# Patient Record
Sex: FEMALE | Race: BLACK OR AFRICAN AMERICAN | NOT HISPANIC OR LATINO | Employment: FULL TIME | RURAL
[De-identification: names, ages, dates, MRNs, and addresses within clinical notes are randomized per-mention and may not be internally consistent; named-entity substitution may affect disease eponyms.]

---

## 2018-02-19 ENCOUNTER — HISTORICAL (OUTPATIENT)
Dept: ADMINISTRATIVE | Facility: HOSPITAL | Age: 48
End: 2018-02-19

## 2018-02-20 LAB
LAB AP CLINICAL INFORMATION: NORMAL
LAB AP DIAGNOSIS - HISTORICAL: NORMAL
LAB AP GROSS PATHOLOGY - HISTORICAL: NORMAL
LAB AP SPECIMEN SUBMITTED - HISTORICAL: NORMAL

## 2018-02-22 ENCOUNTER — HISTORICAL (OUTPATIENT)
Dept: ADMINISTRATIVE | Facility: HOSPITAL | Age: 48
End: 2018-02-22

## 2019-03-06 ENCOUNTER — HISTORICAL (OUTPATIENT)
Dept: ADMINISTRATIVE | Facility: HOSPITAL | Age: 49
End: 2019-03-06

## 2019-03-08 LAB
LAB AP COMMENTS: NORMAL
LAB AP GENERAL CAT - HISTORICAL: NORMAL
LAB AP INTERPRETATION/RESULT - HISTORICAL: NEGATIVE
LAB AP SPECIMEN ADEQUACY - HISTORICAL: NORMAL
LAB AP SPECIMEN SUBMITTED - HISTORICAL: NORMAL

## 2020-07-10 ENCOUNTER — HISTORICAL (OUTPATIENT)
Dept: ADMINISTRATIVE | Facility: HOSPITAL | Age: 50
End: 2020-07-10

## 2020-07-10 LAB
ALBUMIN SERPL BCP-MCNC: 3.9 G/DL (ref 3.5–5)
ALBUMIN/GLOB SERPL: 1.1 {RATIO}
ALP SERPL-CCNC: 89 U/L (ref 39–100)
ALT SERPL W P-5'-P-CCNC: 16 U/L (ref 13–56)
ANION GAP SERPL CALCULATED.3IONS-SCNC: 10 MMOL/L (ref 7–16)
AST SERPL W P-5'-P-CCNC: 12 U/L (ref 15–37)
BACTERIA #/AREA URNS HPF: ABNORMAL /HPF
BASOPHILS # BLD AUTO: 0.03 X10E3/UL (ref 0–0.2)
BASOPHILS NFR BLD AUTO: 0.5 % (ref 0–1)
BILIRUB SERPL-MCNC: 0.2 MG/DL (ref 0–1.2)
BILIRUB UR QL STRIP: NEGATIVE MG/DL
BUN SERPL-MCNC: 16 MG/DL (ref 7–18)
BUN/CREAT SERPL: 17
CALCIUM SERPL-MCNC: 9.1 MG/DL (ref 8.5–10.1)
CHLORIDE SERPL-SCNC: 106 MMOL/L (ref 98–107)
CLARITY UR: CLEAR
CO2 SERPL-SCNC: 30 MMOL/L (ref 21–32)
COLOR UR: YELLOW
CREAT SERPL-MCNC: 0.93 MG/DL (ref 0.5–1.02)
EOSINOPHIL # BLD AUTO: 0.17 X10E3/UL (ref 0–0.5)
EOSINOPHIL NFR BLD AUTO: 2.7 % (ref 1–4)
ERYTHROCYTE [DISTWIDTH] IN BLOOD BY AUTOMATED COUNT: 13.1 % (ref 11.5–14.5)
GLOBULIN SER-MCNC: 3.7 G/DL (ref 2–4)
GLUCOSE SERPL-MCNC: 72 MG/DL (ref 74–106)
GLUCOSE UR STRIP-MCNC: NEGATIVE MG/DL
HCT VFR BLD AUTO: 40.4 % (ref 38–47)
HGB BLD-MCNC: 12.6 G/DL (ref 12–16)
IMM GRANULOCYTES # BLD AUTO: 0.01 X10E3/UL (ref 0–0.04)
IMM GRANULOCYTES NFR BLD: 0.2 % (ref 0–0.4)
KETONES UR STRIP-SCNC: NEGATIVE MG/DL
LEUKOCYTE ESTERASE UR QL STRIP: NEGATIVE LEU/UL
LYMPHOCYTES # BLD AUTO: 2.89 X10E3/UL (ref 1–4.8)
LYMPHOCYTES NFR BLD AUTO: 46.1 % (ref 27–41)
MCH RBC QN AUTO: 30 PG (ref 27–31)
MCHC RBC AUTO-ENTMCNC: 31.2 G/DL (ref 32–36)
MCV RBC AUTO: 96.2 FL (ref 80–96)
MONOCYTES # BLD AUTO: 0.39 X10E3/UL (ref 0–0.8)
MONOCYTES NFR BLD AUTO: 6.2 % (ref 2–6)
MPC BLD CALC-MCNC: 10.1 FL (ref 9.4–12.4)
NEUTROPHILS # BLD AUTO: 2.78 X10E3/UL (ref 1.8–7.7)
NEUTROPHILS NFR BLD AUTO: 44.3 % (ref 53–65)
NITRITE UR QL STRIP: NEGATIVE
NRBC # BLD AUTO: 0 X10E3/UL (ref 0–0)
NRBC, AUTO (.00): 0 /100 (ref 0–0)
PH UR STRIP: 6.5 PH UNITS (ref 5–8)
PLATELET # BLD AUTO: 327 X10E3/UL (ref 150–400)
POTASSIUM SERPL-SCNC: 4.3 MMOL/L (ref 3.5–5.1)
PROT SERPL-MCNC: 7.6 G/DL (ref 6.4–8.2)
PROT UR QL STRIP: NEGATIVE MG/DL
RBC # BLD AUTO: 4.2 X10E6/UL (ref 4.2–5.4)
RBC # UR STRIP: NEGATIVE ERY/UL
RBC #/AREA URNS HPF: ABNORMAL /HPF (ref 0–3)
SODIUM SERPL-SCNC: 142 MMOL/L (ref 136–145)
SP GR UR STRIP: 1.02 (ref 1–1.03)
SQUAMOUS #/AREA URNS LPF: ABNORMAL /LPF
TSH SERPL DL<=0.005 MIU/L-ACNC: 1.62 UIU/ML (ref 0.36–3.74)
UROBILINOGEN UR STRIP-ACNC: 1 EU/DL
WBC # BLD AUTO: 6.27 X10E3/UL (ref 4.5–11)
WBC #/AREA URNS HPF: ABNORMAL /HPF (ref 0–5)

## 2020-07-20 ENCOUNTER — HISTORICAL (OUTPATIENT)
Dept: ADMINISTRATIVE | Facility: HOSPITAL | Age: 50
End: 2020-07-20

## 2020-07-20 LAB — SARS-COV+SARS-COV-2 AG RESP QL IA.RAPID: NEGATIVE

## 2020-11-13 ENCOUNTER — HISTORICAL (OUTPATIENT)
Dept: ADMINISTRATIVE | Facility: HOSPITAL | Age: 50
End: 2020-11-13

## 2021-02-04 ENCOUNTER — HISTORICAL (OUTPATIENT)
Dept: ADMINISTRATIVE | Facility: HOSPITAL | Age: 51
End: 2021-02-04

## 2021-02-04 LAB
ALBUMIN SERPL BCP-MCNC: 4.1 G/DL (ref 3.5–5)
ALBUMIN/GLOB SERPL: 1.1 {RATIO}
ALP SERPL-CCNC: 109 U/L (ref 39–100)
ALT SERPL W P-5'-P-CCNC: 16 U/L (ref 13–56)
ANION GAP SERPL CALCULATED.3IONS-SCNC: 11 MMOL/L (ref 7–16)
AST SERPL W P-5'-P-CCNC: 8 U/L (ref 15–37)
BACTERIA #/AREA URNS HPF: ABNORMAL /HPF
BASOPHILS # BLD AUTO: 0.04 X10E3/UL (ref 0–0.2)
BASOPHILS NFR BLD AUTO: 0.6 % (ref 0–1)
BILIRUB SERPL-MCNC: 0.4 MG/DL (ref 0–1.2)
BILIRUB UR QL STRIP: NEGATIVE MG/DL
BUN SERPL-MCNC: 15 MG/DL (ref 7–18)
BUN/CREAT SERPL: 20
CALCIUM SERPL-MCNC: 9.3 MG/DL (ref 8.5–10.1)
CHLORIDE SERPL-SCNC: 103 MMOL/L (ref 98–107)
CLARITY UR: CLEAR
CO2 SERPL-SCNC: 31 MMOL/L (ref 21–32)
COLOR UR: YELLOW
CREAT SERPL-MCNC: 0.75 MG/DL (ref 0.5–1.02)
EOSINOPHIL # BLD AUTO: 0.26 X10E3/UL (ref 0–0.5)
EOSINOPHIL NFR BLD AUTO: 4 % (ref 1–4)
ERYTHROCYTE [DISTWIDTH] IN BLOOD BY AUTOMATED COUNT: 12.7 % (ref 11.5–14.5)
GLOBULIN SER-MCNC: 3.9 G/DL (ref 2–4)
GLUCOSE SERPL-MCNC: 68 MG/DL (ref 74–106)
GLUCOSE UR STRIP-MCNC: NEGATIVE MG/DL
HCT VFR BLD AUTO: 41.1 % (ref 38–47)
HGB BLD-MCNC: 13.1 G/DL (ref 12–16)
IMM GRANULOCYTES # BLD AUTO: 0.01 X10E3/UL (ref 0–0.04)
IMM GRANULOCYTES NFR BLD: 0.2 % (ref 0–0.4)
KETONES UR STRIP-SCNC: NEGATIVE MG/DL
LEUKOCYTE ESTERASE UR QL STRIP: NEGATIVE LEU/UL
LYMPHOCYTES # BLD AUTO: 2.33 X10E3/UL (ref 1–4.8)
LYMPHOCYTES NFR BLD AUTO: 35.7 % (ref 27–41)
MCH RBC QN AUTO: 30.1 PG (ref 27–31)
MCHC RBC AUTO-ENTMCNC: 31.9 G/DL (ref 32–36)
MCV RBC AUTO: 94.5 FL (ref 80–96)
MONOCYTES # BLD AUTO: 0.48 X10E3/UL (ref 0–0.8)
MONOCYTES NFR BLD AUTO: 7.4 % (ref 2–6)
MPC BLD CALC-MCNC: 10.2 FL (ref 9.4–12.4)
MUCOUS THREADS #/AREA URNS HPF: ABNORMAL /HPF
NEUTROPHILS # BLD AUTO: 3.4 X10E3/UL (ref 1.8–7.7)
NEUTROPHILS NFR BLD AUTO: 52.1 % (ref 53–65)
NITRITE UR QL STRIP: NEGATIVE
NRBC # BLD AUTO: 0 X10E3/UL (ref 0–0)
NRBC, AUTO (.00): 0 /100 (ref 0–0)
PH UR STRIP: 6.5 PH UNITS (ref 5–8)
PLATELET # BLD AUTO: 391 X10E3/UL (ref 150–400)
POTASSIUM SERPL-SCNC: 3.9 MMOL/L (ref 3.5–5.1)
PROT SERPL-MCNC: 8 G/DL (ref 6.4–8.2)
PROT UR QL STRIP: NEGATIVE MG/DL
RBC # BLD AUTO: 4.35 X10E6/UL (ref 4.2–5.4)
RBC # UR STRIP: NEGATIVE ERY/UL
RBC #/AREA URNS HPF: ABNORMAL /HPF (ref 0–3)
SODIUM SERPL-SCNC: 141 MMOL/L (ref 136–145)
SP GR UR STRIP: 1.02 (ref 1–1.03)
SQUAMOUS #/AREA URNS LPF: ABNORMAL /LPF
T4 FREE SERPL-MCNC: 1.13 NG/DL (ref 0.76–1.46)
TSH SERPL DL<=0.005 MIU/L-ACNC: 2.67 UIU/ML (ref 0.36–3.74)
UROBILINOGEN UR STRIP-ACNC: 1 EU/DL
WBC # BLD AUTO: 6.52 X10E3/UL (ref 4.5–11)
WBC #/AREA URNS HPF: ABNORMAL /HPF (ref 0–5)

## 2021-02-08 LAB
REPORT: NORMAL

## 2021-04-02 ENCOUNTER — HOSPITAL ENCOUNTER (OUTPATIENT)
Dept: RADIOLOGY | Facility: HOSPITAL | Age: 51
Discharge: HOME | End: 2021-04-02
Attending: NURSE PRACTITIONER
Payer: COMMERCIAL

## 2021-04-02 DIAGNOSIS — M54.50 LOW BACK PAIN: ICD-10-CM

## 2021-04-02 PROCEDURE — 72040 X-RAY EXAM NECK SPINE 2-3 VW: CPT | Mod: 26,,, | Performed by: RADIOLOGY

## 2021-04-02 PROCEDURE — 72070 XR THORACIC SPINE AP LATERAL: ICD-10-PCS | Mod: 26,,, | Performed by: RADIOLOGY

## 2021-04-02 PROCEDURE — 72070 X-RAY EXAM THORAC SPINE 2VWS: CPT | Mod: 26,,, | Performed by: RADIOLOGY

## 2021-04-02 PROCEDURE — 72070 X-RAY EXAM THORAC SPINE 2VWS: CPT | Mod: TC

## 2021-04-02 PROCEDURE — 72040 X-RAY EXAM NECK SPINE 2-3 VW: CPT | Mod: TC

## 2021-04-02 PROCEDURE — 72040 XR CERVICAL SPINE 2 OR 3 VIEWS: ICD-10-PCS | Mod: 26,,, | Performed by: RADIOLOGY

## 2021-12-08 ENCOUNTER — HOSPITAL ENCOUNTER (OUTPATIENT)
Dept: RADIOLOGY | Facility: HOSPITAL | Age: 51
Discharge: HOME | End: 2021-12-08
Payer: COMMERCIAL

## 2021-12-08 VITALS — WEIGHT: 178 LBS | BODY MASS INDEX: 27.94 KG/M2 | HEIGHT: 67 IN

## 2021-12-08 DIAGNOSIS — Z12.31 VISIT FOR SCREENING MAMMOGRAM: ICD-10-CM

## 2021-12-08 PROCEDURE — 77067 SCR MAMMO BI INCL CAD: CPT | Mod: TC

## 2022-08-15 ENCOUNTER — HOSPITAL ENCOUNTER (EMERGENCY)
Facility: HOSPITAL | Age: 52
Discharge: HOME | End: 2022-08-15
Payer: COMMERCIAL

## 2022-08-15 VITALS
TEMPERATURE: 98 F | HEART RATE: 66 BPM | DIASTOLIC BLOOD PRESSURE: 81 MMHG | HEIGHT: 67 IN | BODY MASS INDEX: 27.47 KG/M2 | RESPIRATION RATE: 14 BRPM | SYSTOLIC BLOOD PRESSURE: 126 MMHG | OXYGEN SATURATION: 97 % | WEIGHT: 175 LBS

## 2022-08-15 DIAGNOSIS — K59.00 CONSTIPATION, UNSPECIFIED CONSTIPATION TYPE: Primary | ICD-10-CM

## 2022-08-15 LAB
ALBUMIN SERPL BCP-MCNC: 3.8 G/DL (ref 3.5–5)
ALBUMIN/GLOB SERPL: 1.2 {RATIO}
ALP SERPL-CCNC: 90 U/L (ref 39–100)
ALT SERPL W P-5'-P-CCNC: 15 U/L (ref 13–56)
ANION GAP SERPL CALCULATED.3IONS-SCNC: 9 MMOL/L (ref 7–16)
AST SERPL W P-5'-P-CCNC: 7 U/L (ref 15–37)
BASOPHILS # BLD AUTO: 0.03 K/UL (ref 0–0.2)
BASOPHILS NFR BLD AUTO: 0.5 % (ref 0–1)
BILIRUB SERPL-MCNC: 0.4 MG/DL (ref 0–1.2)
BUN SERPL-MCNC: 14 MG/DL (ref 7–18)
BUN/CREAT SERPL: 22 (ref 6–20)
CALCIUM SERPL-MCNC: 9.7 MG/DL (ref 8.5–10.1)
CHLORIDE SERPL-SCNC: 107 MMOL/L (ref 98–107)
CO2 SERPL-SCNC: 27 MMOL/L (ref 21–32)
CREAT SERPL-MCNC: 0.63 MG/DL (ref 0.55–1.02)
DIFFERENTIAL METHOD BLD: ABNORMAL
EGFR (NO RACE VARIABLE) (RUSH/TITUS): 109 ML/MIN/1.73M²
EOSINOPHIL # BLD AUTO: 0.21 K/UL (ref 0–0.5)
EOSINOPHIL NFR BLD AUTO: 3.6 % (ref 1–4)
ERYTHROCYTE [DISTWIDTH] IN BLOOD BY AUTOMATED COUNT: 13.1 % (ref 11.5–14.5)
GLOBULIN SER-MCNC: 3.3 G/DL (ref 2–4)
GLUCOSE SERPL-MCNC: 88 MG/DL (ref 74–106)
HCT VFR BLD AUTO: 37.1 % (ref 38–47)
HGB BLD-MCNC: 12.2 G/DL (ref 12–16)
IMM GRANULOCYTES # BLD AUTO: 0.02 K/UL (ref 0–0.04)
IMM GRANULOCYTES NFR BLD: 0.3 % (ref 0–0.4)
LYMPHOCYTES # BLD AUTO: 2.71 K/UL (ref 1–4.8)
LYMPHOCYTES NFR BLD AUTO: 46.2 % (ref 27–41)
MCH RBC QN AUTO: 30.9 PG (ref 27–31)
MCHC RBC AUTO-ENTMCNC: 32.9 G/DL (ref 32–36)
MCV RBC AUTO: 93.9 FL (ref 80–96)
MONOCYTES # BLD AUTO: 0.57 K/UL (ref 0–0.8)
MONOCYTES NFR BLD AUTO: 9.7 % (ref 2–6)
MPC BLD CALC-MCNC: 9.4 FL (ref 9.4–12.4)
NEUTROPHILS # BLD AUTO: 2.33 K/UL (ref 1.8–7.7)
NEUTROPHILS NFR BLD AUTO: 39.7 % (ref 53–65)
NRBC # BLD AUTO: 0 X10E3/UL
NRBC, AUTO (.00): 0 %
PLATELET # BLD AUTO: 313 K/UL (ref 150–400)
POTASSIUM SERPL-SCNC: 3.7 MMOL/L (ref 3.5–5.1)
PROT SERPL-MCNC: 7.1 G/DL (ref 6.4–8.2)
RBC # BLD AUTO: 3.95 M/UL (ref 4.2–5.4)
SODIUM SERPL-SCNC: 139 MMOL/L (ref 136–145)
TROPONIN I SERPL HS-MCNC: 10.2 PG/ML
TROPONIN I SERPL HS-MCNC: 9.8 PG/ML
WBC # BLD AUTO: 5.87 K/UL (ref 4.5–11)

## 2022-08-15 PROCEDURE — 93005 ELECTROCARDIOGRAM TRACING: CPT

## 2022-08-15 PROCEDURE — 99285 EMERGENCY DEPT VISIT HI MDM: CPT | Mod: 25

## 2022-08-15 PROCEDURE — 99283 EMERGENCY DEPT VISIT LOW MDM: CPT | Mod: ,,, | Performed by: NURSE PRACTITIONER

## 2022-08-15 PROCEDURE — 99283 PR EMERGENCY DEPT VISIT,LEVEL III: ICD-10-PCS | Mod: ,,, | Performed by: NURSE PRACTITIONER

## 2022-08-15 PROCEDURE — 84484 ASSAY OF TROPONIN QUANT: CPT | Performed by: NURSE PRACTITIONER

## 2022-08-15 PROCEDURE — 85025 COMPLETE CBC W/AUTO DIFF WBC: CPT | Performed by: EMERGENCY MEDICINE

## 2022-08-15 PROCEDURE — 80053 COMPREHEN METABOLIC PANEL: CPT | Performed by: EMERGENCY MEDICINE

## 2022-08-15 PROCEDURE — 36415 COLL VENOUS BLD VENIPUNCTURE: CPT | Performed by: NURSE PRACTITIONER

## 2022-08-15 PROCEDURE — 84484 ASSAY OF TROPONIN QUANT: CPT | Performed by: EMERGENCY MEDICINE

## 2022-08-15 PROCEDURE — 93010 EKG 12-LEAD: ICD-10-PCS | Mod: ,,, | Performed by: HOSPITALIST

## 2022-08-15 PROCEDURE — 36415 COLL VENOUS BLD VENIPUNCTURE: CPT | Performed by: EMERGENCY MEDICINE

## 2022-08-15 PROCEDURE — 93010 ELECTROCARDIOGRAM REPORT: CPT | Mod: ,,, | Performed by: HOSPITALIST

## 2022-08-16 ENCOUNTER — TELEPHONE (OUTPATIENT)
Dept: EMERGENCY MEDICINE | Facility: HOSPITAL | Age: 52
End: 2022-08-16
Payer: COMMERCIAL

## 2022-08-16 NOTE — ED PROVIDER NOTES
Encounter Date: 8/15/2022       History     Chief Complaint   Patient presents with    Chest Pain     Patient presents to ER with the complaint of of epigastric pain.  Patient states her symptoms started 3 days ago.  She admits to nausea but denies vomiting. She reports chronic constipation that requires medication or OTC laxatives to relieve.  Patient states she has not had bowel movement in 3 days, despite taking magnesium citrate and linzess x 1 dose.  She states pain starts in her abdomen and radiates to her epigastric/ lower chest.  She denies fever.  No shortness of breath.     The history is provided by the patient. No  was used.     Review of patient's allergies indicates:  No Known Allergies  Past Medical History:   Diagnosis Date    Hypertension     Thyroid disease      Past Surgical History:   Procedure Laterality Date    HYSTERECTOMY       History reviewed. No pertinent family history.  Social History     Tobacco Use    Smoking status: Current Every Day Smoker     Types: Cigarettes    Smokeless tobacco: Never Used   Substance Use Topics    Alcohol use: Not Currently    Drug use: Never     Review of Systems   Constitutional: Positive for activity change, appetite change and fatigue.   Cardiovascular: Positive for chest pain.   Gastrointestinal: Positive for abdominal pain, constipation and nausea.   All other systems reviewed and are negative.      Physical Exam     Initial Vitals [08/15/22 1651]   BP Pulse Resp Temp SpO2   109/73 77 16 98.4 °F (36.9 °C) 98 %      MAP       --         Physical Exam    Nursing note and vitals reviewed.  Constitutional: She appears well-developed and well-nourished.   HENT:   Head: Normocephalic.   Right Ear: External ear normal.   Left Ear: External ear normal.   Nose: Nose normal.   Mouth/Throat: Oropharynx is clear and moist.   Eyes: Conjunctivae and EOM are normal. Pupils are equal, round, and reactive to light.   Neck: Neck supple.   Normal  range of motion.  Cardiovascular: Normal rate, regular rhythm, normal heart sounds and intact distal pulses.   Pulmonary/Chest: Breath sounds normal.   Abdominal: Abdomen is soft and protuberant. Bowel sounds are normal. She exhibits distension. There is generalized abdominal tenderness and tenderness in the epigastric area.   Musculoskeletal:         General: Normal range of motion.      Cervical back: Normal range of motion and neck supple.     Neurological: She is alert and oriented to person, place, and time. She has normal strength. GCS score is 15. GCS eye subscore is 4. GCS verbal subscore is 5. GCS motor subscore is 6.   Skin: Skin is warm and dry. Capillary refill takes less than 2 seconds.   Psychiatric: She has a normal mood and affect. Her behavior is normal. Judgment and thought content normal.         Medical Screening Exam   See Full Note    ED Course   Procedures  Labs Reviewed   COMPREHENSIVE METABOLIC PANEL - Abnormal; Notable for the following components:       Result Value    BUN/Creatinine Ratio 22 (*)     AST 7 (*)     All other components within normal limits   CBC WITH DIFFERENTIAL - Abnormal; Notable for the following components:    RBC 3.95 (*)     Hematocrit 37.1 (*)     Neutrophils % 39.7 (*)     Lymphocytes % 46.2 (*)     Monocytes % 9.7 (*)     All other components within normal limits   TROPONIN I - Normal   TROPONIN I - Normal   CBC W/ AUTO DIFFERENTIAL    Narrative:     The following orders were created for panel order CBC auto differential.  Procedure                               Abnormality         Status                     ---------                               -----------         ------                     CBC with Differential[[id number]]        Abnormal            Final result                 Please view results for these tests on the individual orders.          Imaging Results          X-Ray Abdomen Flat And Erect (Final result)  Result time 08/15/22 20:44:46    Final result  by Kunal Ruth DO (08/15/22 20:44:46)                 Impression:      No acute findings.    Point of Service: [facility]      Electronically signed by: Kunal Ruth  Date:    08/15/2022  Time:    20:44             Narrative:    EXAMINATION:  XR ABDOMEN FLAT AND ERECT    CLINICAL HISTORY:  Unspecified abdominal pain    COMPARISON:  None    TECHNIQUE:  Frontal views of the abdomen in the supine and upright position.    FINDINGS:  Nonspecific nonobstructive bowel gas pattern.  No free intraperitoneal air demonstrated.  Visualized osseous and surrounding soft tissue structures demonstrate no acute abnormality.                               X-Ray Chest AP Portable (Final result)  Result time 08/15/22 18:41:21    Final result by Kunal Ruth DO (08/15/22 18:41:21)                 Impression:      No acute cardiopulmonary process demonstrated.    Point of Service: [facility]      Electronically signed by: Kunal Ruth  Date:    08/15/2022  Time:    18:41             Narrative:    EXAMINATION:  XR CHEST AP PORTABLE    CLINICAL HISTORY:  Chest pain, unspecified    COMPARISON:  Chest x-ray February 24, 2018    TECHNIQUE:  Frontal view/views of the chest.    FINDINGS:  Heart size appears within normal limits.  Chronic change of the lungs without focal consolidation, pleural effusion, or pneumothorax.  Visualized osseous and surrounding soft tissue structures demonstrate no acute abnormality.                                 Medications - No data to display              ED Course as of 08/15/22 2212   Mon Aug 15, 2022   1907 EKG 12-lead [CQ]      ED Course User Index  [CQ] BUCK [place]          Clinical Impression:   Final diagnoses:  [K59.00] Constipation, unspecified constipation type (Primary)          ED Disposition Condition    Discharge Stable        ED Prescriptions     None        Follow-up Information     Follow up With Specialties Details Why Contact Info    Jana Portillo NP  Family Medicine Schedule an appointment as soon as possible for a visit in 2 days If symptoms worsen [address] [facility] [id number]  [phone]             Annamaria Freeman, BUCK  08/15/22 1650

## 2022-09-22 ENCOUNTER — OFFICE VISIT (OUTPATIENT)
Dept: FAMILY MEDICINE | Facility: CLINIC | Age: 52
End: 2022-09-22
Payer: COMMERCIAL

## 2022-09-22 VITALS
TEMPERATURE: 99 F | WEIGHT: 181 LBS | HEART RATE: 73 BPM | HEIGHT: 67 IN | SYSTOLIC BLOOD PRESSURE: 100 MMHG | DIASTOLIC BLOOD PRESSURE: 70 MMHG | BODY MASS INDEX: 28.41 KG/M2

## 2022-09-22 DIAGNOSIS — M62.838 MUSCLE SPASM: ICD-10-CM

## 2022-09-22 DIAGNOSIS — M54.2 NECK PAIN: Primary | ICD-10-CM

## 2022-09-22 PROCEDURE — 3008F PR BODY MASS INDEX (BMI) DOCUMENTED: ICD-10-PCS | Mod: ,,, | Performed by: NURSE PRACTITIONER

## 2022-09-22 PROCEDURE — 3074F PR MOST RECENT SYSTOLIC BLOOD PRESSURE < 130 MM HG: ICD-10-PCS | Mod: ,,, | Performed by: NURSE PRACTITIONER

## 2022-09-22 PROCEDURE — 3074F SYST BP LT 130 MM HG: CPT | Mod: ,,, | Performed by: NURSE PRACTITIONER

## 2022-09-22 PROCEDURE — 96372 THER/PROPH/DIAG INJ SC/IM: CPT | Mod: ,,, | Performed by: NURSE PRACTITIONER

## 2022-09-22 PROCEDURE — 99213 OFFICE O/P EST LOW 20 MIN: CPT | Mod: 25,,, | Performed by: NURSE PRACTITIONER

## 2022-09-22 PROCEDURE — 1159F MED LIST DOCD IN RCRD: CPT | Mod: ,,, | Performed by: NURSE PRACTITIONER

## 2022-09-22 PROCEDURE — 96372 PR INJECTION,THERAP/PROPH/DIAG2ST, IM OR SUBCUT: ICD-10-PCS | Mod: ,,, | Performed by: NURSE PRACTITIONER

## 2022-09-22 PROCEDURE — 3078F PR MOST RECENT DIASTOLIC BLOOD PRESSURE < 80 MM HG: ICD-10-PCS | Mod: ,,, | Performed by: NURSE PRACTITIONER

## 2022-09-22 PROCEDURE — 3078F DIAST BP <80 MM HG: CPT | Mod: ,,, | Performed by: NURSE PRACTITIONER

## 2022-09-22 PROCEDURE — 1159F PR MEDICATION LIST DOCUMENTED IN MEDICAL RECORD: ICD-10-PCS | Mod: ,,, | Performed by: NURSE PRACTITIONER

## 2022-09-22 PROCEDURE — 99213 PR OFFICE/OUTPT VISIT, EST, LEVL III, 20-29 MIN: ICD-10-PCS | Mod: 25,,, | Performed by: NURSE PRACTITIONER

## 2022-09-22 PROCEDURE — 3008F BODY MASS INDEX DOCD: CPT | Mod: ,,, | Performed by: NURSE PRACTITIONER

## 2022-09-22 RX ORDER — KETOROLAC TROMETHAMINE 30 MG/ML
60 INJECTION, SOLUTION INTRAMUSCULAR; INTRAVENOUS
Status: COMPLETED | OUTPATIENT
Start: 2022-09-22 | End: 2022-09-22

## 2022-09-22 RX ORDER — LEVOTHYROXINE SODIUM 100 UG/1
100 TABLET ORAL
COMMUNITY
End: 2022-11-29 | Stop reason: SDUPTHER

## 2022-09-22 RX ORDER — TIZANIDINE 4 MG/1
4 TABLET ORAL EVERY 6 HOURS PRN
Qty: 30 TABLET | Refills: 0 | Status: SHIPPED | OUTPATIENT
Start: 2022-09-22 | End: 2022-10-02

## 2022-09-22 RX ORDER — OMEPRAZOLE 20 MG/1
20 CAPSULE, DELAYED RELEASE ORAL DAILY
COMMUNITY
End: 2022-11-01 | Stop reason: SDUPTHER

## 2022-09-22 RX ORDER — LOSARTAN POTASSIUM AND HYDROCHLOROTHIAZIDE 12.5; 5 MG/1; MG/1
1 TABLET ORAL DAILY
COMMUNITY
End: 2022-11-29 | Stop reason: SDUPTHER

## 2022-09-22 RX ORDER — MELOXICAM 15 MG/1
15 TABLET ORAL DAILY
COMMUNITY
End: 2023-05-30

## 2022-09-22 RX ADMIN — KETOROLAC TROMETHAMINE 60 MG: 30 INJECTION, SOLUTION INTRAMUSCULAR; INTRAVENOUS at 05:09

## 2022-09-22 NOTE — PROGRESS NOTES
Subjective:       Patient ID: Adele Solares is a 52 y.o. female.    Chief Complaint: Arm Pain (Went to ER 1 month ago. Has arthritis. States heating pad and advil has not helped today. )    Left neck and arm pain- muscle spasms- taking 800 mg motrin PRN pain    Arm Pain     Review of Systems   Musculoskeletal:  Positive for myalgias and neck pain.       Objective:      Physical Exam  Vitals and nursing note reviewed.   Constitutional:       General: She is not in acute distress.     Appearance: Normal appearance. She is not ill-appearing, toxic-appearing or diaphoretic.   HENT:      Head: Normocephalic.   Eyes:      Extraocular Movements: Extraocular movements intact.      Pupils: Pupils are equal, round, and reactive to light.   Neck:        Comments: Tightness and TTP in the left sternocleidomastoid and left trapezius region  Cardiovascular:      Rate and Rhythm: Normal rate and regular rhythm.      Pulses: Normal pulses.      Heart sounds: Normal heart sounds. No murmur heard.  Pulmonary:      Effort: Pulmonary effort is normal. No respiratory distress.      Breath sounds: Normal breath sounds. No wheezing, rhonchi or rales.   Musculoskeletal:         General: Normal range of motion.      Cervical back: Normal range of motion and neck supple. Tenderness present. No erythema, signs of trauma or crepitus. Pain with movement and muscular tenderness present. No spinous process tenderness.   Lymphadenopathy:      Cervical: No cervical adenopathy.   Skin:     General: Skin is warm and dry.      Capillary Refill: Capillary refill takes less than 2 seconds.      Findings: No rash.   Neurological:      Mental Status: She is alert and oriented to person, place, and time.   Psychiatric:         Mood and Affect: Mood normal.         Behavior: Behavior normal.         Thought Content: Thought content normal.         Judgment: Judgment normal.          Assessment:       1. Neck pain        Plan:   Neck pain  -     X-Ray  Cervical Spine AP And Lateral; Future; Expected date: 09/22/2022  -     ketorolac injection 60 mg  -     tiZANidine (ZANAFLEX) 4 MG tablet; Take 1 tablet (4 mg total) by mouth every 6 (six) hours as needed (muscle spasm).  Dispense: 30 tablet; Refill: 0

## 2022-10-13 DIAGNOSIS — Z12.11 COLON CANCER SCREENING: Primary | ICD-10-CM

## 2022-11-01 ENCOUNTER — HOSPITAL ENCOUNTER (OUTPATIENT)
Dept: RADIOLOGY | Facility: HOSPITAL | Age: 52
Discharge: HOME | End: 2022-11-01
Attending: INTERNAL MEDICINE
Payer: COMMERCIAL

## 2022-11-01 ENCOUNTER — OFFICE VISIT (OUTPATIENT)
Dept: INTERNAL MEDICINE | Facility: CLINIC | Age: 52
End: 2022-11-01
Payer: COMMERCIAL

## 2022-11-01 VITALS
WEIGHT: 183 LBS | HEIGHT: 67 IN | DIASTOLIC BLOOD PRESSURE: 68 MMHG | HEART RATE: 74 BPM | RESPIRATION RATE: 20 BRPM | SYSTOLIC BLOOD PRESSURE: 117 MMHG | OXYGEN SATURATION: 100 % | BODY MASS INDEX: 28.72 KG/M2

## 2022-11-01 DIAGNOSIS — M54.50 CHRONIC LOW BACK PAIN, UNSPECIFIED BACK PAIN LATERALITY, UNSPECIFIED WHETHER SCIATICA PRESENT: ICD-10-CM

## 2022-11-01 DIAGNOSIS — M54.2 NECK PAIN: ICD-10-CM

## 2022-11-01 DIAGNOSIS — M62.838 MUSCLE SPASM: ICD-10-CM

## 2022-11-01 DIAGNOSIS — F17.200 TOBACCO DEPENDENCE: ICD-10-CM

## 2022-11-01 DIAGNOSIS — M25.512 ACUTE PAIN OF LEFT SHOULDER: ICD-10-CM

## 2022-11-01 DIAGNOSIS — I10 ESSENTIAL HYPERTENSION: ICD-10-CM

## 2022-11-01 DIAGNOSIS — K21.9 GASTROESOPHAGEAL REFLUX DISEASE, UNSPECIFIED WHETHER ESOPHAGITIS PRESENT: ICD-10-CM

## 2022-11-01 DIAGNOSIS — Z76.89 ENCOUNTER TO ESTABLISH CARE WITH NEW DOCTOR: Primary | ICD-10-CM

## 2022-11-01 DIAGNOSIS — G89.29 CHRONIC LOW BACK PAIN, UNSPECIFIED BACK PAIN LATERALITY, UNSPECIFIED WHETHER SCIATICA PRESENT: ICD-10-CM

## 2022-11-01 DIAGNOSIS — E03.9 HYPOTHYROIDISM, UNSPECIFIED TYPE: ICD-10-CM

## 2022-11-01 PROCEDURE — 72040 X-RAY EXAM NECK SPINE 2-3 VW: CPT | Mod: 26,,, | Performed by: STUDENT IN AN ORGANIZED HEALTH CARE EDUCATION/TRAINING PROGRAM

## 2022-11-01 PROCEDURE — 3078F PR MOST RECENT DIASTOLIC BLOOD PRESSURE < 80 MM HG: ICD-10-PCS | Mod: ,,, | Performed by: INTERNAL MEDICINE

## 2022-11-01 PROCEDURE — 99214 OFFICE O/P EST MOD 30 MIN: CPT | Mod: PBBFAC | Performed by: INTERNAL MEDICINE

## 2022-11-01 PROCEDURE — 3074F SYST BP LT 130 MM HG: CPT | Mod: ,,, | Performed by: INTERNAL MEDICINE

## 2022-11-01 PROCEDURE — 3008F PR BODY MASS INDEX (BMI) DOCUMENTED: ICD-10-PCS | Mod: ,,, | Performed by: INTERNAL MEDICINE

## 2022-11-01 PROCEDURE — 96372 THER/PROPH/DIAG INJ SC/IM: CPT | Mod: PBBFAC,59 | Performed by: INTERNAL MEDICINE

## 2022-11-01 PROCEDURE — 3074F PR MOST RECENT SYSTOLIC BLOOD PRESSURE < 130 MM HG: ICD-10-PCS | Mod: ,,, | Performed by: INTERNAL MEDICINE

## 2022-11-01 PROCEDURE — 3008F BODY MASS INDEX DOCD: CPT | Mod: ,,, | Performed by: INTERNAL MEDICINE

## 2022-11-01 PROCEDURE — 3078F DIAST BP <80 MM HG: CPT | Mod: ,,, | Performed by: INTERNAL MEDICINE

## 2022-11-01 PROCEDURE — 1159F PR MEDICATION LIST DOCUMENTED IN MEDICAL RECORD: ICD-10-PCS | Mod: ,,, | Performed by: INTERNAL MEDICINE

## 2022-11-01 PROCEDURE — 1159F MED LIST DOCD IN RCRD: CPT | Mod: ,,, | Performed by: INTERNAL MEDICINE

## 2022-11-01 PROCEDURE — 72040 X-RAY EXAM NECK SPINE 2-3 VW: CPT | Mod: TC

## 2022-11-01 PROCEDURE — 99204 PR OFFICE/OUTPT VISIT, NEW, LEVL IV, 45-59 MIN: ICD-10-PCS | Mod: S$PBB,25,, | Performed by: INTERNAL MEDICINE

## 2022-11-01 PROCEDURE — 90471 IMMUNIZATION ADMIN: CPT | Mod: PBBFAC | Performed by: INTERNAL MEDICINE

## 2022-11-01 PROCEDURE — 72040 XR CERVICAL SPINE 2 OR 3 VIEWS: ICD-10-PCS | Mod: 26,,, | Performed by: STUDENT IN AN ORGANIZED HEALTH CARE EDUCATION/TRAINING PROGRAM

## 2022-11-01 PROCEDURE — 99204 OFFICE O/P NEW MOD 45 MIN: CPT | Mod: S$PBB,25,, | Performed by: INTERNAL MEDICINE

## 2022-11-01 RX ORDER — IBUPROFEN 800 MG/1
800 TABLET ORAL 3 TIMES DAILY
COMMUNITY
Start: 2022-09-08 | End: 2022-11-29

## 2022-11-01 RX ORDER — METHYLPREDNISOLONE ACETATE 40 MG/ML
40 INJECTION, SUSPENSION INTRA-ARTICULAR; INTRALESIONAL; INTRAMUSCULAR; SOFT TISSUE
Status: COMPLETED | OUTPATIENT
Start: 2022-11-01 | End: 2022-11-01

## 2022-11-01 RX ORDER — KETOROLAC TROMETHAMINE 30 MG/ML
60 INJECTION, SOLUTION INTRAMUSCULAR; INTRAVENOUS
Status: COMPLETED | OUTPATIENT
Start: 2022-11-01 | End: 2022-11-01

## 2022-11-01 RX ORDER — METHOCARBAMOL 750 MG/1
750 TABLET, FILM COATED ORAL 4 TIMES DAILY
Qty: 40 TABLET | Refills: 2 | Status: SHIPPED | OUTPATIENT
Start: 2022-11-01 | End: 2022-11-11

## 2022-11-01 RX ORDER — OMEPRAZOLE 40 MG/1
40 CAPSULE, DELAYED RELEASE ORAL DAILY
Qty: 30 CAPSULE | Refills: 5 | Status: SHIPPED | OUTPATIENT
Start: 2022-11-01 | End: 2022-11-29 | Stop reason: SDUPTHER

## 2022-11-01 RX ORDER — PREDNISONE 20 MG/1
20 TABLET ORAL DAILY
Qty: 7 TABLET | Refills: 0 | Status: SHIPPED | OUTPATIENT
Start: 2022-11-01 | End: 2023-05-30

## 2022-11-01 RX ADMIN — METHYLPREDNISOLONE ACETATE 40 MG: 40 INJECTION, SUSPENSION INTRA-ARTICULAR; INTRALESIONAL; INTRAMUSCULAR; SOFT TISSUE at 11:11

## 2022-11-01 RX ADMIN — KETOROLAC TROMETHAMINE 60 MG: 60 INJECTION, SOLUTION INTRAMUSCULAR at 11:11

## 2022-11-01 NOTE — PROGRESS NOTES
"Subjective:       Patient ID: Adele Solares is a 52 y.o. female.    Chief Complaint: Establish Care, Thyroid Problem, Oral Swelling (Happens with certain foods; NKA but do have thyroid complications; takes protonix for heart burn  ), Back Pain, and Weight Gain (10 LBS WITHIN 2M)    The patient is a 51 yo female that presents today to establish care. She has history of hypothyroidism, hypertension, gerd, and osteoarthritis. She is up to date on colonoscopy and mammogram. She complains of some left neck pain and left shoulder pain. This has been ongoing for "awhile". Made worse with certain movements. She denies any known injury. Some radiation from neck to left arm. No numbness or tingling. She does continue to smoke about 1 pack per day. We have discussed cessation. She did quit one time with the help of chantix for 3 months but started back. Today she is resting comfortably in no distress. She does complain of some reflux at times. She is currently afebrile and vital signs are stable.    Thyroid Problem  Patient reports no anxiety, cold intolerance, constipation, diarrhea, fatigue, heat intolerance, menstrual problem, palpitations or tremors.   Back Pain  Pertinent negatives include no abdominal pain, chest pain, dysuria, fever, headaches or weakness.   Review of Systems   Constitutional:  Positive for unexpected weight change. Negative for activity change, appetite change, chills, fatigue and fever.   HENT:  Negative for nasal congestion, ear pain, hearing loss, rhinorrhea, sinus pressure/congestion, sore throat and trouble swallowing.    Eyes:  Negative for pain, discharge, redness and visual disturbance.   Respiratory:  Positive for chest tightness. Negative for apnea, cough, shortness of breath and wheezing.    Cardiovascular:  Negative for chest pain and palpitations.   Gastrointestinal:  Positive for reflux. Negative for abdominal pain, blood in stool, constipation, diarrhea, nausea and vomiting. "   Endocrine: Negative for cold intolerance, heat intolerance, polydipsia and polyuria.   Genitourinary:  Negative for difficulty urinating, dysuria, hematuria and menstrual problem.   Musculoskeletal:  Positive for arthralgias, back pain and neck pain. Negative for joint swelling and myalgias.   Integumentary:  Negative for pallor, rash and wound.   Allergic/Immunologic: Negative for immunocompromised state.   Neurological:  Negative for tremors, seizures, weakness, headaches and memory loss.   Hematological:  Negative for adenopathy.   Psychiatric/Behavioral:  Negative for confusion, dysphoric mood and sleep disturbance. The patient is not nervous/anxious.        Objective:      Physical Exam  Vitals and nursing note reviewed.   Constitutional:       General: She is not in acute distress.     Appearance: Normal appearance. She is not ill-appearing.   HENT:      Head: Normocephalic and atraumatic.      Right Ear: External ear normal.      Left Ear: External ear normal.      Nose: Nose normal.      Mouth/Throat:      Pharynx: Oropharynx is clear.   Eyes:      Extraocular Movements: Extraocular movements intact.      Conjunctiva/sclera: Conjunctivae normal.      Pupils: Pupils are equal, round, and reactive to light.   Neck:      Vascular: No carotid bruit.   Cardiovascular:      Rate and Rhythm: Normal rate and regular rhythm.      Pulses: Normal pulses.      Heart sounds: Normal heart sounds. No murmur heard.  Pulmonary:      Effort: No respiratory distress.      Breath sounds: Normal breath sounds. No wheezing or rales.   Abdominal:      General: Bowel sounds are normal.      Palpations: Abdomen is soft.   Musculoskeletal:         General: Tenderness present. Normal range of motion.      Cervical back: Normal range of motion and neck supple.      Right lower leg: No edema.      Left lower leg: No edema.      Comments: Tenderness over muscles of left posterior neck    Open can test positive on left. She is also  limited in movement by pain   Skin:     General: Skin is warm and dry.      Capillary Refill: Capillary refill takes less than 2 seconds.      Coloration: Skin is not pale.   Neurological:      General: No focal deficit present.      Mental Status: She is alert and oriented to person, place, and time.      Cranial Nerves: No cranial nerve deficit.      Sensory: No sensory deficit.      Motor: No weakness.      Gait: Gait normal.   Psychiatric:         Mood and Affect: Mood normal.         Judgment: Judgment normal.       Assessment:       Problem List Items Addressed This Visit          Cardiac/Vascular    Essential hypertension       Endocrine    Hypothyroidism       GI    Gastroesophageal reflux disease       Orthopedic    Muscle spasm    Neck pain    Chronic low back pain    Acute pain of left shoulder       Other    Tobacco dependence     Other Visit Diagnoses       Encounter to establish care with new doctor    -  Primary              Plan:       The patient presents today to establish care. She is up to date on mammogram and colonoscopy. Lab work back in August 2022    2.   Essential hypertension- blood pressure is 117/68.   She is on losartan-hctz 50/12.5. Creatinine is 0.75. Blood sugar is 85    3.   Hypothyroidism- she is on levothyroxine 100 mcg daily    4.   GERD- still having some issues. We are going to increase omeprazole to 40 mg daily. 30 minutes prior to first meal    5.   Arthralgias- neck and left shoulder. We will image neck today. Toradol and steroid injection. Mobic prn. Also robaxin prn    6.   Tobacco dependence- counseled on cessation. She is in contemplation stage. She quit before for 3 months using chantix      RTC 2 months

## 2022-11-29 ENCOUNTER — OFFICE VISIT (OUTPATIENT)
Dept: INTERNAL MEDICINE | Facility: CLINIC | Age: 52
End: 2022-11-29
Payer: COMMERCIAL

## 2022-11-29 VITALS
WEIGHT: 186 LBS | HEART RATE: 76 BPM | DIASTOLIC BLOOD PRESSURE: 66 MMHG | RESPIRATION RATE: 20 BRPM | HEIGHT: 67 IN | SYSTOLIC BLOOD PRESSURE: 123 MMHG | OXYGEN SATURATION: 99 % | BODY MASS INDEX: 29.19 KG/M2

## 2022-11-29 DIAGNOSIS — F17.200 TOBACCO DEPENDENCE: ICD-10-CM

## 2022-11-29 DIAGNOSIS — M54.2 NECK PAIN: ICD-10-CM

## 2022-11-29 DIAGNOSIS — E03.9 HYPOTHYROIDISM, UNSPECIFIED TYPE: ICD-10-CM

## 2022-11-29 DIAGNOSIS — I10 ESSENTIAL HYPERTENSION: ICD-10-CM

## 2022-11-29 DIAGNOSIS — Z09 FOLLOW-UP EXAM: Primary | ICD-10-CM

## 2022-11-29 DIAGNOSIS — M62.838 MUSCLE SPASM: ICD-10-CM

## 2022-11-29 PROCEDURE — 3078F DIAST BP <80 MM HG: CPT | Mod: ,,, | Performed by: INTERNAL MEDICINE

## 2022-11-29 PROCEDURE — 99214 PR OFFICE/OUTPT VISIT, EST, LEVL IV, 30-39 MIN: ICD-10-PCS | Mod: S$PBB,,, | Performed by: INTERNAL MEDICINE

## 2022-11-29 PROCEDURE — 99214 OFFICE O/P EST MOD 30 MIN: CPT | Mod: PBBFAC | Performed by: INTERNAL MEDICINE

## 2022-11-29 PROCEDURE — 3074F PR MOST RECENT SYSTOLIC BLOOD PRESSURE < 130 MM HG: ICD-10-PCS | Mod: ,,, | Performed by: INTERNAL MEDICINE

## 2022-11-29 PROCEDURE — 1159F MED LIST DOCD IN RCRD: CPT | Mod: ,,, | Performed by: INTERNAL MEDICINE

## 2022-11-29 PROCEDURE — 1159F PR MEDICATION LIST DOCUMENTED IN MEDICAL RECORD: ICD-10-PCS | Mod: ,,, | Performed by: INTERNAL MEDICINE

## 2022-11-29 PROCEDURE — 3008F BODY MASS INDEX DOCD: CPT | Mod: ,,, | Performed by: INTERNAL MEDICINE

## 2022-11-29 PROCEDURE — 3078F PR MOST RECENT DIASTOLIC BLOOD PRESSURE < 80 MM HG: ICD-10-PCS | Mod: ,,, | Performed by: INTERNAL MEDICINE

## 2022-11-29 PROCEDURE — 99214 OFFICE O/P EST MOD 30 MIN: CPT | Mod: S$PBB,,, | Performed by: INTERNAL MEDICINE

## 2022-11-29 PROCEDURE — 3008F PR BODY MASS INDEX (BMI) DOCUMENTED: ICD-10-PCS | Mod: ,,, | Performed by: INTERNAL MEDICINE

## 2022-11-29 PROCEDURE — 3074F SYST BP LT 130 MM HG: CPT | Mod: ,,, | Performed by: INTERNAL MEDICINE

## 2022-11-29 RX ORDER — OMEPRAZOLE 40 MG/1
40 CAPSULE, DELAYED RELEASE ORAL DAILY
Qty: 90 CAPSULE | Refills: 3 | Status: SHIPPED | OUTPATIENT
Start: 2022-11-29 | End: 2023-08-30 | Stop reason: SDUPTHER

## 2022-11-29 RX ORDER — KETOROLAC TROMETHAMINE 10 MG/1
10 TABLET, FILM COATED ORAL EVERY 6 HOURS
Qty: 20 TABLET | Refills: 0 | Status: SHIPPED | OUTPATIENT
Start: 2022-11-29 | End: 2022-12-04

## 2022-11-29 RX ORDER — LEVOTHYROXINE SODIUM 75 UG/1
75 TABLET ORAL
COMMUNITY
Start: 2022-06-06 | End: 2022-11-29

## 2022-11-29 RX ORDER — METHOCARBAMOL 750 MG/1
750 TABLET, FILM COATED ORAL 4 TIMES DAILY
Qty: 40 TABLET | Refills: 0 | Status: SHIPPED | OUTPATIENT
Start: 2022-11-29 | End: 2022-12-09

## 2022-11-29 RX ORDER — LEVOTHYROXINE SODIUM 100 UG/1
100 TABLET ORAL
Qty: 90 TABLET | Refills: 3 | Status: SHIPPED | OUTPATIENT
Start: 2022-11-29 | End: 2023-05-30 | Stop reason: SDUPTHER

## 2022-11-29 RX ORDER — LOSARTAN POTASSIUM AND HYDROCHLOROTHIAZIDE 12.5; 5 MG/1; MG/1
1 TABLET ORAL DAILY
Qty: 90 TABLET | Refills: 1 | Status: SHIPPED | OUTPATIENT
Start: 2022-11-29 | End: 2023-05-30 | Stop reason: SDUPTHER

## 2022-11-29 NOTE — PROGRESS NOTES
"Subjective:       Patient ID: Adele Solares is a 52 y.o. female.    Chief Complaint: Follow-up (4weeks ), Back Pain, and Neck Pain    The patient is a 53 yo female that presents today to establish care. She has history of hypothyroidism, hypertension, gerd, and osteoarthritis. She is up to date on colonoscopy and mammogram. She complains of some left neck pain and left shoulder pain. This has been ongoing for "awhile". Made worse with certain movements. She denies any known injury. Some radiation from neck to left arm. No numbness or tingling. She does continue to smoke about 1 pack per day. We have discussed cessation. She did quit one time with the help of chantix for 3 months but started back. Today she is resting comfortably in no distress. She does complain of some reflux at times. She is currently afebrile and vital signs are stable.    11/29/22-the patient presents today for follow-up.  She continues to have some issues with her neck.  The Toradol shot and prednisone did help some.  However it did not go all the way away.  X-ray of the cervical spine did not show any acute process.  There were some degenerative changes.  She does need refills on her blood pressure, thyroid, and GERD medications.  Otherwise she is doing okay.  She is afebrile and vital signs are stable    Follow-up  Associated symptoms include arthralgias, joint swelling and neck pain. Pertinent negatives include no abdominal pain, chest pain, chills, congestion, coughing, fatigue, fever, headaches, myalgias, nausea, rash, sore throat, vomiting or weakness.   Back Pain  Pertinent negatives include no abdominal pain, chest pain, dysuria, fever, headaches or weakness.   Neck Pain   Pertinent negatives include no chest pain, fever, headaches, trouble swallowing or weakness.   Thyroid Problem  Patient reports no anxiety, cold intolerance, constipation, diarrhea, fatigue, heat intolerance, menstrual problem, palpitations or tremors.   Review of " Systems   Constitutional:  Negative for activity change, appetite change, chills, fatigue, fever and unexpected weight change.   HENT:  Negative for nasal congestion, ear pain, hearing loss, rhinorrhea, sinus pressure/congestion, sore throat and trouble swallowing.    Eyes:  Negative for pain, discharge, redness and visual disturbance.   Respiratory:  Negative for apnea, cough, chest tightness, shortness of breath and wheezing.    Cardiovascular:  Negative for chest pain and palpitations.   Gastrointestinal:  Positive for reflux. Negative for abdominal pain, blood in stool, constipation, diarrhea, nausea and vomiting.   Endocrine: Negative for cold intolerance, heat intolerance, polydipsia and polyuria.   Genitourinary:  Negative for difficulty urinating, dysuria, hematuria and menstrual problem.   Musculoskeletal:  Positive for arthralgias, back pain, joint swelling and neck pain. Negative for myalgias.   Integumentary:  Negative for pallor, rash and wound.   Allergic/Immunologic: Negative for immunocompromised state.   Neurological:  Negative for tremors, seizures, weakness, headaches and memory loss.   Hematological:  Negative for adenopathy.   Psychiatric/Behavioral:  Negative for confusion, dysphoric mood and sleep disturbance. The patient is not nervous/anxious.        Objective:      Physical Exam  Vitals and nursing note reviewed.   Constitutional:       General: She is not in acute distress.     Appearance: Normal appearance. She is not ill-appearing.   HENT:      Head: Normocephalic and atraumatic.      Right Ear: External ear normal.      Left Ear: External ear normal.      Nose: Nose normal.      Mouth/Throat:      Pharynx: Oropharynx is clear.   Eyes:      Extraocular Movements: Extraocular movements intact.      Conjunctiva/sclera: Conjunctivae normal.      Pupils: Pupils are equal, round, and reactive to light.   Neck:      Vascular: No carotid bruit.   Cardiovascular:      Rate and Rhythm: Normal  rate and regular rhythm.      Pulses: Normal pulses.      Heart sounds: Normal heart sounds. No murmur heard.  Pulmonary:      Effort: No respiratory distress.      Breath sounds: Normal breath sounds. No wheezing or rales.   Abdominal:      General: Bowel sounds are normal.      Palpations: Abdomen is soft.   Musculoskeletal:         General: Tenderness present. Normal range of motion.      Cervical back: Normal range of motion and neck supple.      Right lower leg: No edema.      Left lower leg: No edema.      Comments: Tenderness over muscles of left posterior neck    Open can test positive on left. She is also limited in movement by pain   Skin:     General: Skin is warm and dry.      Capillary Refill: Capillary refill takes less than 2 seconds.      Coloration: Skin is not pale.   Neurological:      General: No focal deficit present.      Mental Status: She is alert and oriented to person, place, and time.      Cranial Nerves: No cranial nerve deficit.      Sensory: No sensory deficit.      Motor: No weakness.      Gait: Gait normal.   Psychiatric:         Mood and Affect: Mood normal.         Judgment: Judgment normal.       Assessment:       Problem List Items Addressed This Visit          Cardiac/Vascular    Essential hypertension       Endocrine    Hypothyroidism       Orthopedic    Muscle spasm    Neck pain    Relevant Orders    Ambulatory referral/consult to Physical/Occupational Therapy       Other    Tobacco dependence     Other Visit Diagnoses       Follow-up exam    -  Primary              Plan:       The patient presents today t for follow-up She is up to date on mammogram and colonoscopy. Lab work back in August 2022    2.   Essential hypertension- blood pressure is 123/66   She is on losartan-hctz 50/12.5. Creatinine is 0.75. Blood sugar is 85.  We will refill her Hyzaar today    3.   Hypothyroidism- she is on levothyroxine 100 mcg daily.  We will refill her Synthroid today    4.   GERD- still  having some issues. We are going to increase omeprazole to 40 mg daily. 30 minutes prior to first meal  Increasing to 40 mg has helped.  We will continue with the 40 mg dose    5.   Arthralgias- neck and left shoulder. We will image neck today. Toradol and steroid injection. Mobic prn. Also robaxin prn  11/29-she is still having some issues.  X-ray with no acute process.  We will continue with NSAIDs and Robaxin.  Refer to physical therapy    6.   Tobacco dependence- counseled on cessation. She is in contemplation stage. She quit before for 3 months using chantix      RTC 6 months

## 2022-12-01 ENCOUNTER — HOSPITAL ENCOUNTER (OUTPATIENT)
Dept: GASTROENTEROLOGY | Facility: HOSPITAL | Age: 52
Discharge: HOME | End: 2022-12-01
Attending: INTERNAL MEDICINE
Payer: COMMERCIAL

## 2022-12-01 ENCOUNTER — ANESTHESIA EVENT (OUTPATIENT)
Dept: GASTROENTEROLOGY | Facility: HOSPITAL | Age: 52
End: 2022-12-01
Payer: COMMERCIAL

## 2022-12-01 ENCOUNTER — ANESTHESIA (OUTPATIENT)
Dept: GASTROENTEROLOGY | Facility: HOSPITAL | Age: 52
End: 2022-12-01
Payer: COMMERCIAL

## 2022-12-01 VITALS
RESPIRATION RATE: 15 BRPM | BODY MASS INDEX: 28.72 KG/M2 | SYSTOLIC BLOOD PRESSURE: 118 MMHG | OXYGEN SATURATION: 98 % | DIASTOLIC BLOOD PRESSURE: 72 MMHG | TEMPERATURE: 98 F | WEIGHT: 183 LBS | HEART RATE: 59 BPM | HEIGHT: 67 IN

## 2022-12-01 DIAGNOSIS — Z86.010 HISTORY OF COLON POLYPS: Primary | ICD-10-CM

## 2022-12-01 DIAGNOSIS — K57.30 COLON, DIVERTICULOSIS: ICD-10-CM

## 2022-12-01 DIAGNOSIS — K63.5 POLYP OF SIGMOID COLON, UNSPECIFIED TYPE: ICD-10-CM

## 2022-12-01 DIAGNOSIS — K63.5 POLYP OF TRANSVERSE COLON, UNSPECIFIED TYPE: ICD-10-CM

## 2022-12-01 DIAGNOSIS — Z12.11 COLON CANCER SCREENING: ICD-10-CM

## 2022-12-01 PROBLEM — Z86.0100 HISTORY OF COLON POLYPS: Status: ACTIVE | Noted: 2022-12-01

## 2022-12-01 PROCEDURE — 37000008 HC ANESTHESIA 1ST 15 MINUTES

## 2022-12-01 PROCEDURE — 63600175 PHARM REV CODE 636 W HCPCS: Performed by: NURSE ANESTHETIST, CERTIFIED REGISTERED

## 2022-12-01 PROCEDURE — 45380 COLONOSCOPY AND BIOPSY: CPT | Mod: PT | Performed by: INTERNAL MEDICINE

## 2022-12-01 PROCEDURE — 88305 TISSUE EXAM BY PATHOLOGIST: CPT | Mod: 26,,, | Performed by: PATHOLOGY

## 2022-12-01 PROCEDURE — 88305 SURGICAL PATHOLOGY: ICD-10-PCS | Mod: 26,,, | Performed by: PATHOLOGY

## 2022-12-01 PROCEDURE — D9220A PRA ANESTHESIA: Mod: 33,,, | Performed by: NURSE ANESTHETIST, CERTIFIED REGISTERED

## 2022-12-01 PROCEDURE — 25000003 PHARM REV CODE 250: Performed by: NURSE ANESTHETIST, CERTIFIED REGISTERED

## 2022-12-01 PROCEDURE — 27201423 OPTIME MED/SURG SUP & DEVICES STERILE SUPPLY

## 2022-12-01 PROCEDURE — 88305 TISSUE EXAM BY PATHOLOGIST: CPT | Mod: TC,SUR | Performed by: INTERNAL MEDICINE

## 2022-12-01 PROCEDURE — D9220A PRA ANESTHESIA: ICD-10-PCS | Mod: 33,,, | Performed by: NURSE ANESTHETIST, CERTIFIED REGISTERED

## 2022-12-01 PROCEDURE — 37000009 HC ANESTHESIA EA ADD 15 MINS

## 2022-12-01 RX ORDER — PROPOFOL 10 MG/ML
VIAL (ML) INTRAVENOUS
Status: DISCONTINUED | OUTPATIENT
Start: 2022-12-01 | End: 2022-12-01

## 2022-12-01 RX ORDER — LIDOCAINE HYDROCHLORIDE 20 MG/ML
INJECTION INTRAVENOUS
Status: DISCONTINUED | OUTPATIENT
Start: 2022-12-01 | End: 2022-12-01

## 2022-12-01 RX ORDER — SODIUM CHLORIDE 0.9 % (FLUSH) 0.9 %
10 SYRINGE (ML) INJECTION
Status: DISCONTINUED | OUTPATIENT
Start: 2022-12-01 | End: 2022-12-02 | Stop reason: HOSPADM

## 2022-12-01 RX ADMIN — LIDOCAINE HYDROCHLORIDE 50 MG: 20 INJECTION, SOLUTION INTRAVENOUS at 10:12

## 2022-12-01 RX ADMIN — PROPOFOL 25 MG: 10 INJECTION, EMULSION INTRAVENOUS at 10:12

## 2022-12-01 RX ADMIN — SODIUM CHLORIDE: 9 INJECTION, SOLUTION INTRAVENOUS at 10:12

## 2022-12-01 RX ADMIN — PROPOFOL 50 MG: 10 INJECTION, EMULSION INTRAVENOUS at 10:12

## 2022-12-01 NOTE — ANESTHESIA POSTPROCEDURE EVALUATION
Anesthesia Post Evaluation    Patient: Adele Solares    Procedure(s) Performed: Colonoscopy    Final Anesthesia Type: general      Patient location during evaluation: GI PACU  Patient participation: Yes- Able to Participate  Level of consciousness: awake and alert  Post-procedure vital signs: reviewed and stable  Pain management: adequate  Airway patency: patent    PONV status at discharge: No PONV  Anesthetic complications: no      Cardiovascular status: blood pressure returned to baseline and hemodynamically stable  Respiratory status: spontaneous ventilation, unassisted and room air  Hydration status: euvolemic  Follow-up not needed.          Vitals Value Taken Time   /73 12/01/22 1100   Temp 97.8f 12/01/22 1113   Pulse 59 12/01/22 1102   Resp 15 12/01/22 1102   SpO2 88 % 12/01/22 1102   Vitals shown include unvalidated device data.      No case tracking events are documented in the log.      Pain/Varun Score: Varun Score: 10 (12/1/2022 10:36 AM)

## 2022-12-01 NOTE — ANESTHESIA PREPROCEDURE EVALUATION
12/01/2022  Adele Solares is a 52 y.o., female.      Pre-op Assessment    I have reviewed the Patient Summary Reports.    I have reviewed the NPO Status.   I have reviewed the Medications.     Review of Systems  Anesthesia Hx:  No problems with previous Anesthesia    Hematology/Oncology:  Hematology Normal   Oncology Normal     EENT/Dental:EENT/Dental Normal   Cardiovascular:   Exercise tolerance: good Hypertension NYHA Classification II    Pulmonary:  Pulmonary Normal    Renal/:  Renal/ Normal     Hepatic/GI:   GERD    Musculoskeletal:  Musculoskeletal Normal    Neurological:  Neurology Normal    Endocrine:   Hypothyroidism    Dermatological:  Skin Normal    Psych:  Psychiatric Normal           Physical Exam  General: Well nourished, Cooperative, Alert and Oriented    Airway:  Mallampati: II   Mouth Opening: Normal  TM Distance: Normal  Tongue: Normal  Neck ROM: Normal ROM    Dental:  Intact    Chest/Lungs:  Normal Respiratory Rate    Heart:  Rate: Normal  Rhythm: Regular Rhythm        Anesthesia Plan  Type of Anesthesia, risks & benefits discussed:    Anesthesia Type: Gen Natural Airway  Intra-op Monitoring Plan: Standard ASA Monitors  Post Op Pain Control Plan: multimodal analgesia  Induction:  IV  Informed Consent: Informed consent signed with the Patient and all parties understand the risks and agree with anesthesia plan.  All questions answered. Patient consented to blood products? Yes  ASA Score: 2  Day of Surgery Review of History & Physical: I have interviewed and examined the patient. I have reviewed the patient's H&P dated:     Ready For Surgery From Anesthesia Perspective.     .    Current Outpatient Medications   Medication Sig    ketorolac (TORADOL) 10 mg tablet Take 1 tablet (10 mg total) by mouth every 6 (six) hours. for 5 days    levothyroxine (SYNTHROID) 100 MCG tablet Take 1  tablet (100 mcg total) by mouth before breakfast.    losartan-hydrochlorothiazide 50-12.5 mg (HYZAAR) 50-12.5 mg per tablet Take 1 tablet by mouth once daily.    meloxicam (MOBIC) 15 MG tablet Take 15 mg by mouth once daily.    methocarbamoL (ROBAXIN) 750 MG Tab Take 1 tablet (750 mg total) by mouth 4 (four) times daily. for 10 days    omeprazole (PRILOSEC) 40 MG capsule Take 1 capsule (40 mg total) by mouth once daily.    predniSONE (DELTASONE) 20 MG tablet Take 1 tablet (20 mg total) by mouth once daily.     No current facility-administered medications for this encounter.     Past Medical History:   Diagnosis Date    Hypertension     Thyroid disease

## 2022-12-01 NOTE — H&P
[facility]  Gastroenterology  H&P    Patient Name: Adele Solares  MRN: [id number]  Admission Date: 12/1/2022  Code Status: Full Code    Attending Provider: Flaquito Rousseau MD   Primary Care Physician: Yobani Lombardo DO  Principal Problem:<principal problem not specified>    Subjective:     History of Present Illness: Pt has history of colon polyps. Her last colonoscopy was approximately 4 years ago.    Past Medical History:   Diagnosis Date    Hypertension     Thyroid disease        Past Surgical History:   Procedure Laterality Date    HYSTERECTOMY         Review of patient's allergies indicates:  No Known Allergies  Family History    None       Tobacco Use    Smoking status: Every Day     Packs/day: 0.50     Years: 30.00     Pack years: 15.00     Types: Cigarettes    Smokeless tobacco: Never   Substance and Sexual Activity    Alcohol use: Not Currently    Drug use: Never    Sexual activity: Yes     Review of Systems   Respiratory: Negative.     Cardiovascular: Negative.    Gastrointestinal: Negative.    Objective:     Vital Signs (Most Recent):  Pulse: 69 (12/01/22 0940)  Resp: 15 (12/01/22 0940)  BP: 127/76 (12/01/22 0940)  SpO2: 100 % (12/01/22 0940) Vital Signs (24h Range):  Pulse:  [69] 69  Resp:  [15] 15  SpO2:  [100 %] 100 %  BP: (127)/(76) 127/76     Weight: 83 kg (183 lb) (12/01/22 0940)  Body mass index is 28.66 kg/m².    No intake or output data in the 24 hours ending 12/01/22 1005    Lines/Drains/Airways       Peripheral Intravenous Line  Duration                  Peripheral IV - Single Lumen 12/01/22 0950 22 G Anterior;Proximal;Right Forearm <1 day                    Physical Exam  Vitals reviewed.   Constitutional:       General: She is not in acute distress.     Appearance: Normal appearance. She is well-developed. She is not ill-appearing.   HENT:      Head: Normocephalic and atraumatic.      Nose: Nose normal.   Eyes:      Pupils: Pupils are equal, round, and reactive to light.    Cardiovascular:      Rate and Rhythm: Normal rate and regular rhythm.   Pulmonary:      Effort: Pulmonary effort is normal.      Breath sounds: Normal breath sounds. No wheezing.   Abdominal:      General: Abdomen is flat. Bowel sounds are normal. There is no distension.      Palpations: Abdomen is soft.      Tenderness: There is no abdominal tenderness. There is no guarding.   Skin:     General: Skin is warm and dry.      Coloration: Skin is not jaundiced.   Neurological:      Mental Status: She is alert.   Psychiatric:         Attention and Perception: Attention normal.         Mood and Affect: Affect normal.         Speech: Speech normal.         Behavior: Behavior is cooperative.      Comments: Pt was calm while speaking.       Significant Labs:  CBC: No results for input(s): WBC, HGB, HCT, PLT in the last 48 hours.  CMP: No results for input(s): GLU, CALCIUM, ALBUMIN, PROT, NA, K, CO2, CL, BUN, CREATININE, ALKPHOS, ALT, AST, BILITOT in the last 48 hours.    Significant Imaging:  Imaging results within the past 24 hours have been reviewed.    Assessment/Plan:     There are no hospital problems to display for this patient.        Imp:History of colon polyps  Plan; colonoscopy    Flaquito Rousseau MD  Gastroenterology  [facility]

## 2022-12-01 NOTE — DISCHARGE INSTRUCTIONS
Procedure Date  12/1/22     Impression  Overall Impression: Diverticula were noted in the sigmoid and descending colon.  One diminutive transverse colon. Two diminutive polyps were removed with biopsy from the sigmoid colon.      Recommendation    Await pathology results     Repeat colonoscopy in 5 years      High fiber diet  Disp: DC to home in stable condition. Resume diet and activity. No driving x 24 hours. F/U with PCP as scheduled. Dx: History of colon polyps, colon diverticulosis, three polyps were removed on this exam.    THE NURSE WILL CALL YOU WITH YOUR BIOPSY RESULTS IN A FEW DAYS.     NO DRIVING, OPERATING EQUIPMENT, OR SIGNING LEGAL DOCUMENTS FOR 24 HOURS.

## 2022-12-01 NOTE — TRANSFER OF CARE
"Anesthesia Transfer of Care Note    Patient: Adele Solares    Procedure(s) Performed: Colonoscopy    Patient location: GI    Anesthesia Type: general    Transport from OR: Transported from OR on room air with adequate spontaneous ventilation    Post pain: adequate analgesia    Post assessment: no apparent anesthetic complications    Post vital signs: stable    Level of consciousness: responds to stimulation    Nausea/Vomiting: no nausea/vomiting    Complications: none    Transfer of care protocol was followed      Last vitals:   Visit Vitals  /71 (BP Location: Left arm, Patient Position: Lying)   Pulse 70   Temp 36.6 °C (97.8 °F) (Skin)   Resp 20   Ht 5' 7" (1.702 m)   Wt 83 kg (183 lb)   SpO2 98%   Breastfeeding No   BMI 28.66 kg/m²     "

## 2022-12-02 LAB
ESTROGEN SERPL-MCNC: NORMAL PG/ML
INSULIN SERPL-ACNC: NORMAL U[IU]/ML
LAB AP GROSS DESCRIPTION: NORMAL
LAB AP LABORATORY NOTES: NORMAL
T3RU NFR SERPL: NORMAL %

## 2022-12-06 ENCOUNTER — CLINICAL SUPPORT (OUTPATIENT)
Dept: REHABILITATION | Facility: HOSPITAL | Age: 52
End: 2022-12-06
Payer: COMMERCIAL

## 2022-12-06 DIAGNOSIS — M54.2 NECK PAIN: ICD-10-CM

## 2022-12-06 DIAGNOSIS — M62.838 MUSCLE SPASM: Primary | ICD-10-CM

## 2022-12-06 PROCEDURE — 97140 MANUAL THERAPY 1/> REGIONS: CPT

## 2022-12-06 PROCEDURE — 97161 PT EVAL LOW COMPLEX 20 MIN: CPT

## 2022-12-06 PROCEDURE — 97014 ELECTRIC STIMULATION THERAPY: CPT

## 2022-12-06 NOTE — PLAN OF CARE
[facility]  Physical Therapy Initial Evaluation    Name: Adele Solares  Clinic Number: [id number]    Therapy Diagnosis:   Encounter Diagnosis   Name Primary?    Neck pain      Physician: Yobani Lombardo DO    Physician Orders: PT Eval and Treat   Medical Diagnosis from Referral: neck pain  Evaluation Date: 12/6/2022  Authorization Period Expiration: n/a  Plan of Care Expiration: 1/20/2023  Progress Note Due: 1/5/2023  Visit # / Visits authorized: 1/ 20    Time In: 7:48 am  Time Out: 8:44 am  Total Appointment Time (timed & untimed codes): 56 minutes    Precautions: Standard    Subjective   Date of onset: months  History of current condition - Adele reports: says her neck does not hurt all the time - she says it swells and when it does then it hurts - xrays done last month - no history of surgery -      Medical History:   Past Medical History:   Diagnosis Date    Hypertension     Thyroid disease        Surgical History:   Adele Solares  has a past surgical history that includes Hysterectomy.    Medications:   Adele has a current medication list which includes the following prescription(s): levothyroxine, losartan-hydrochlorothiazide 50-12.5 mg, meloxicam, methocarbamol, omeprazole, and prednisone.    Allergies:   Review of patient's allergies indicates:  No Known Allergies     Imaging, xray    Prior Therapy: none  Social History:  lives alone  Occupation: works at [employer]   Prior Level of Function: independent   Current Level of Function: independent     Pain:  Current 4/10, worst 8/10, best 0/10   Location: bilateral upper traps - left side worse   Description: Aching, Throbbing, Grabbing, and Tight  Aggravating Factors: Eating and smoking make her neck swell which increases her pain  Easing Factors: heating pad and muscle relaxers    Pts goals: get rid of pain    Objective     Posture:   Forward head: increased  Thoracic curve: increased  Cervical curve:  decreased  Laterally flexed: no  Rotated: no  Rounded shoulders: yes  Scoliosis: no  Shoulder height:  equal  Clavical height:  equal  Comments:    Cervical AROM:  FDB: WNL  BB: WNL  SBL: limited  SBR: limited  RL: WNL  RR: WNL    MMT Right  Left    Shoulder flexion  MMT strength: 5/5 MMT strength: 5/5   Shoulder external rotation  MMT shoulder: 4/5 MMT strength: 4/5   Shoulder internal rotation  MMT strength: 4+/5 MMT strength: 4+/5   C5 Biceps MMT strength: 5/5 MMT strength: 5/5   C6 Wrist extension MMT strength: 5/5 MMT strength: 5/5   C7 Triceps MMT strength: 5/5 MMT strength: 5/5     Shoulder AROM Right  Left    Flexion (180) WNL WNL   Internal rotation (45) WNL WNL   External rotation (45) WNL WNL   Abduction (180) WNL WNL       Palpation Body side Positive/negative Increased tone   Sternocleidomastoid bilateral Negative no   Scalenes bilateral Negative no   1st rib bilateral Negative    Upper traps bilateral Positive yes   Suboccipitals bilateral Negative no   Transverse process bilateral Negative    Spinous process C1-C7 Negative    Pec Minor bilateral Negative no     Other tests/information: marked spasm in left upper trap    Limitation/Restriction for FOTO Neck Survey    Therapist reviewed FOTO scores for Adele Solares on 12/6/2022.   FOTO documents entered into [employer] - see Media section.    Intake Score: 96         TREATMENT     Total Treatment time (time-based codes) separate from Evaluation: 25 minutes    Adele received the treatments listed below:  MFR to bilateral upper traps w/ emphasis on left - manual stretching to left upper trap - corner stretch, upper trap stretch, and seated scapular retraction for home exercise program - hivolt current to bilateral upper traps with ice x 10 minutes to decrease spasm    Home Exercises and Patient Education Provided    Education provided:   - evaluation results, plan of care and home exercise program     Written Home Exercises Provided: yes.  Exercises were  reviewed and Adele was able to demonstrate them prior to the end of the session.  Adele demonstrated good  understanding of the education provided.     See EMR under Patient Instructions for exercises provided 12/6/2022.    Assessment   Adele is a 52 y.o. female referred to outpatient Physical Therapy with a medical diagnosis of neck pain. Pt presents with complaints of neck pain that is actually in her upper traps. She has marked increased tone in left upper trap and is very tender to palpation. She has normal cervical range of motion except slight decrease in lateral flexion due mainly to tightness in upper traps. She denies sleep disturbance. She states there are certain things like eating, drinking, and smoking that make her neck swell and that is what increases her pain.    Pt prognosis is Good.   Pt will benefit from skilled outpatient Physical Therapy to address the deficits stated above and in the chart below, provide pt/family education, and to maximize pt's level of independence.     Plan of care discussed with patient: Yes  Pt's spiritual, cultural and educational needs considered and patient is agreeable to the plan of care and goals as stated below:     Anticipated Barriers for therapy: none      Goals:  Patient will be independent with home exercise program to facilitate carryover between visits.   Patient will demonstrate improved postural awarenss in keeping shoulders back and neck retracted when performing activiites of daily living.  Patient will increase strength in rotator cuff to 5/5 and scapular stabilizers to 4+/5 to aid in goal 2.   Patient will have decreased tone in left upper trap to aid in decreasing her pain to 0/10 for improved quality of life.    Plan   Plan of care Certification: 12/6/2022 to 1/20/2023.    Outpatient Physical Therapy 2 times weekly for 6 weeks to include the following interventions: Cervical/Lumbar Traction, Electrical Stimulation IFC/hivolt/premod as needed,  Manual Therapy, Moist Heat/ Ice, Neuromuscular Re-ed, Patient Education, Therapeutic Activities, Therapeutic Exercise, and Ultrasound.     RICARDO HERRON, PT     I CERTIFY THE NEED FOR THESE SERVICES FURNISHED UNDER THIS PLAN OF TREATMENT AND WHILE UNDER MY CARE.    Physician's comments:      Physician's Signature: _________________________________________  Date: __________________________

## 2022-12-07 ENCOUNTER — TELEPHONE (OUTPATIENT)
Dept: GASTROENTEROLOGY | Facility: CLINIC | Age: 52
End: 2022-12-07
Payer: COMMERCIAL

## 2022-12-07 NOTE — TELEPHONE ENCOUNTER
Your colonoscopy pathology results show NO cancer or infections. Dr. Rousseau removed a polyp, so he recommends repeating your colonoscopy in 5 years. Please continue to follow recommendations given after your colonoscopy and call our office with any questions or concerns.      ----- Message from Flaquito Rousseau MD sent at 12/2/2022  4:43 PM CST -----  Place pt on list for colonoscopy in 5 years; one polyp was ta.

## 2022-12-08 ENCOUNTER — CLINICAL SUPPORT (OUTPATIENT)
Dept: REHABILITATION | Facility: HOSPITAL | Age: 52
End: 2022-12-08
Payer: COMMERCIAL

## 2022-12-08 DIAGNOSIS — M62.838 MUSCLE SPASM: ICD-10-CM

## 2022-12-08 DIAGNOSIS — M54.2 NECK PAIN: Primary | ICD-10-CM

## 2022-12-08 PROCEDURE — 97110 THERAPEUTIC EXERCISES: CPT

## 2022-12-08 PROCEDURE — 97112 NEUROMUSCULAR REEDUCATION: CPT

## 2022-12-08 PROCEDURE — 97032 APPL MODALITY 1+ESTIM EA 15: CPT

## 2022-12-08 NOTE — PROGRESS NOTES
"  Physical Therapy Treatment Note     Name: Adele GAMBOA Milton  Clinic Number: [id number]    Therapy Diagnosis:   Encounter Diagnoses   Name Primary?    Neck pain Yes    Muscle spasm      Physician: Yobani Lombardo DO    Visit Date: 12/8/2022    Physician Orders: PT Eval and Treat   Medical Diagnosis from Referral: neck pain  Evaluation Date: 12/6/2022  Authorization Period Expiration: n/a  Plan of Care Expiration: 1/20/2023  Progress Note Due: 1/5/2023  Visit # / Visits authorized: 2/ 20    PTA Visit #:     Time In: 7:38 am  Time Out: 8:30 am  Total Billable Time: 38 minutes    Precautions: Standard      Subjective     Pt reports: it is a little "sore and swole" this morning.  She was compliant with home exercise program.  Response to previous treatment: aggravated and sore  Functional change: none    Pain: 5/10  Location: bilateral upper traps      Objective     Adele received therapeutic exercises to develop strength, endurance, and flexibility for 10 minutes including:  Upper body ergometer x 5 minutes  Corner stretch 5 x 20 second hold     Adele received the following manual therapy techniques: Manual traction, Myofacial release, Soft tissue Mobilization, and passive stretching were applied  for 6 minutes, including:  Bilateral upper trap stretch 3 x 20 second hold each side    Adele participated in neuromuscular re-education activities to improve: Posture and scapular stability for 12 minutes. The following activities were included:  Scapular retraction/extension - blue x 20 w/ 3 second hold   Rows 3 plates - low  - x 20 w/ 3 second hold   Bilateral external rotation - yellow x 20 w/ 3 second hold     Adele participated in dynamic functional therapeutic activities to improve functional performance for 0  minutes, including:      Adele received the following direct contact modalities after being cleared for contraindications: Ultrasound/Hivolt Combo:  Adele received ultrasound/hivolt combo to " decrease tone and spasm applied to bilateral upper traps for a duration of 10 minutes. Patient tolerated treatment well without adverse effects. Therapist was in attendance throughout intervention.    Adele received the following supervised modalities after being cleared for contradictions:     Adele received hot pack for 0 minutes to     Adele received cold pack for 0 minutes to       Home Exercises Provided and Patient Education Provided     Education provided: n/a    Written Home Exercises Provided: Patient instructed to cont prior HEP.  Exercises were reviewed and Adele was able to demonstrate them prior to the end of the session.  Adele demonstrated good  understanding of the education provided.     See EMR under Patient Instructions for exercises provided prior visit.    Assessment   Evaluation Assessment:  Adele is a 52 y.o. female referred to outpatient Physical Therapy with a medical diagnosis of neck pain. Pt presents with complaints of neck pain that is actually in her upper traps. She has marked increased tone in left upper trap and is very tender to palpation. She has normal cervical range of motion except slight decrease in lateral flexion due mainly to tightness in upper traps. She denies sleep disturbance. She states there are certain things like eating, drinking, and smoking that make her neck swell and that is what increases her pain.     Current Assessment:  Patient arrived for first visit following evaluation. She said she was a little sore and aggravated from the manual that was done that day. She stated she was swollen this morning. Performed ultrasound/hivolt combo to bilateral upper traps to decrease spasm and tone in this region. Followed this up with stretching and scapular stabilization exercises. She had no complaints at treatment end. Will progress exercises as tolerated and continue to use modalities as needed.    Adele Is progressing well towards her goals.   Pt prognosis  is Good.     Pt will continue to benefit from skilled outpatient physical therapy to address the deficits listed in the problem list box on initial evaluation, provide pt/family education and to maximize pt's level of independence in the home and community environment.     Pt's spiritual, cultural and educational needs considered and pt agreeable to plan of care and goals.     Anticipated barriers to physical therapy: none    Goals:  Patient will be independent with home exercise program to facilitate carryover between visits.   Patient will demonstrate improved postural awarenss in keeping shoulders back and neck retracted when performing activiites of daily living.  Patient will increase strength in rotator cuff to 5/5 and scapular stabilizers to 4+/5 to aid in goal 2.   Patient will have decreased tone in left upper trap to aid in decreasing her pain to 0/10 for improved quality of life.    Plan     Plan of care Certification: 12/6/2022 to 1/20/2023.     Outpatient Physical Therapy 2 times weekly for 6 weeks to include the following interventions: Cervical/Lumbar Traction, Electrical Stimulation IFC/hivolt/premod as needed, Manual Therapy, Moist Heat/ Ice, Neuromuscular Re-ed, Patient Education, Therapeutic Activities, Therapeutic Exercise, and Ultrasound.     RICARDO HERRON, PT  12/8/2022

## 2022-12-13 ENCOUNTER — CLINICAL SUPPORT (OUTPATIENT)
Dept: REHABILITATION | Facility: HOSPITAL | Age: 52
End: 2022-12-13
Payer: COMMERCIAL

## 2022-12-13 DIAGNOSIS — M62.838 MUSCLE SPASM: ICD-10-CM

## 2022-12-13 DIAGNOSIS — M54.2 NECK PAIN: Primary | ICD-10-CM

## 2022-12-13 PROCEDURE — 97110 THERAPEUTIC EXERCISES: CPT | Mod: CQ

## 2022-12-13 PROCEDURE — 97112 NEUROMUSCULAR REEDUCATION: CPT | Mod: CQ

## 2022-12-13 PROCEDURE — 97032 APPL MODALITY 1+ESTIM EA 15: CPT | Mod: CQ

## 2022-12-13 NOTE — PROGRESS NOTES
Physical Therapy Treatment Note     [id number]    Therapy Diagnosis:   No diagnosis found.    Physician: Yobani Lombardo DO    Visit Date: 12/13/2022    Physician Orders: PT Eval and Treat   Medical Diagnosis from Referral: neck pain  Evaluation Date: 12/6/2022  Authorization Period Expiration: n/a  Plan of Care Expiration: 1/20/2023  Progress Note Due: 1/5/2023  Visit # / Visits authorized: 3/ 20    PTA Visit #: 1    Time In: 7:38 am  Time Out: 8:24 am  Total Billable Time: 42 minutes    Precautions: Standard      Subjective     Pt reports: her left side swells more than her right.  She was compliant with home exercise program.  Response to previous treatment: aggravated and sore  Functional change: none    Pain: 5/10  Location: bilateral upper traps      Objective     Case conference with Vi Doss PT, for initial PTA visit.     Adele received therapeutic exercises to develop strength, endurance, and flexibility for 10 minutes including:  Upper body ergometer x 5 minutes  Corner stretch 5 x 20 second hold     Adele received the following manual therapy techniques: Manual traction, Myofacial release, Soft tissue Mobilization, and passive stretching were applied  for 6 minutes, including:  Bilateral upper trap stretch 3 x 20 second hold each side    Adele participated in neuromuscular re-education activities to improve: Posture and scapular stability for 16 minutes. The following activities were included:  Scapular retraction/extension - blue x 20 w/ 3 second hold   Rows 3 plates - low  - x 20 w/ 3 second hold   Bilateral external rotation - yellow x 30 w/ 3 second hold     Adele participated in dynamic functional therapeutic activities to improve functional performance for 0  minutes, including:      Adele received the following direct contact modalities after being cleared for contraindications: Ultrasound/Hivolt Combo:  Adele received  ultrasound/hivolt combo to decrease tone and spasm applied to bilateral upper traps for a duration of 10 minutes. Patient tolerated treatment well without adverse effects. Therapist was in attendance throughout intervention.        Home Exercises Provided and Patient Education Provided     Education provided: n/a    Written Home Exercises Provided: Patient instructed to cont prior HEP.  Exercises were reviewed and Adele was able to demonstrate them prior to the end of the session.  Adele demonstrated good  understanding of the education provided.     See EMR under Patient Instructions for exercises provided prior visit.    Assessment   Evaluation Assessment:  Adele is a 52 y.o. female referred to outpatient Physical Therapy with a medical diagnosis of neck pain. Pt presents with complaints of neck pain that is actually in her upper traps. She has marked increased tone in left upper trap and is very tender to palpation. She has normal cervical range of motion except slight decrease in lateral flexion due mainly to tightness in upper traps. She denies sleep disturbance. She states there are certain things like eating, drinking, and smoking that make her neck swell and that is what increases her pain.     Current Assessment:  Patient arrived for second visit following evaluation.  She stated she was still swollen this morning, the left more so than the right. Repeated last treatment since she felt better afterward. She had no complaints at treatment end. Will progress exercises as tolerated and continue to use modalities as needed.    Adele Is progressing well towards her goals.   Pt prognosis is Good.     Pt will continue to benefit from skilled outpatient physical therapy to address the deficits listed in the problem list box on initial evaluation, provide pt/family education and to maximize pt's level of independence in the home and community environment.     Pt's spiritual, cultural and educational needs  considered and pt agreeable to plan of care and goals.     Anticipated barriers to physical therapy: none    Goals:  Patient will be independent with home exercise program to facilitate carryover between visits.   Patient will demonstrate improved postural awarenss in keeping shoulders back and neck retracted when performing activiites of daily living.  Patient will increase strength in rotator cuff to 5/5 and scapular stabilizers to 4+/5 to aid in goal 2.   Patient will have decreased tone in left upper trap to aid in decreasing her pain to 0/10 for improved quality of life.    Plan     Plan of care Certification: 12/6/2022 to 1/20/2023.     Outpatient Physical Therapy 2 times weekly for 6 weeks to include the following interventions: Cervical/Lumbar Traction, Electrical Stimulation IFC/hivolt/premod as needed, Manual Therapy, Moist Heat/ Ice, Neuromuscular Re-ed, Patient Education, Therapeutic Activities, Therapeutic Exercise, and Ultrasound.     Garima Osuna, PTA  12/13/2022

## 2022-12-15 ENCOUNTER — CLINICAL SUPPORT (OUTPATIENT)
Dept: REHABILITATION | Facility: HOSPITAL | Age: 52
End: 2022-12-15
Payer: COMMERCIAL

## 2022-12-15 DIAGNOSIS — M62.838 MUSCLE SPASM: ICD-10-CM

## 2022-12-15 DIAGNOSIS — M54.2 NECK PAIN: Primary | ICD-10-CM

## 2022-12-15 PROCEDURE — 97140 MANUAL THERAPY 1/> REGIONS: CPT | Mod: CQ

## 2022-12-15 PROCEDURE — 97032 APPL MODALITY 1+ESTIM EA 15: CPT | Mod: CQ

## 2022-12-15 PROCEDURE — 97110 THERAPEUTIC EXERCISES: CPT | Mod: CQ

## 2022-12-15 NOTE — PROGRESS NOTES
Physical Therapy Treatment Note     [id number]    Therapy Diagnosis:   No diagnosis found.    Physician: Yobani Lombardo DO    Visit Date: 12/15/2022    Physician Orders: PT Eval and Treat   Medical Diagnosis from Referral: neck pain  Evaluation Date: 12/6/2022  Authorization Period Expiration: n/a  Plan of Care Expiration: 1/20/2023  Progress Note Due: 1/5/2023  Visit # / Visits authorized: 4/ 20    PTA Visit #: 2    Time In: 7:44 am  Time Out: 8:27 am  Total Billable Time: 43 minutes    Precautions: Standard      Subjective     Pt reports: she felt ok after last treatment.  She was compliant with home exercise program.  Response to previous treatment: aggravated and sore  Functional change: none    Pain: 5/10  Location: bilateral upper traps      Objective     Adele received therapeutic exercises to develop strength, endurance, and flexibility for 11 minutes including:  Upper body ergometer x 5 minutes  Corner stretch 5 x 20 second hold   Levator scapula stretch 3 x 20 second hold     Adele received the following manual therapy techniques: Manual traction, Myofacial release, Soft tissue Mobilization, and passive stretching were applied  for 14 minutes, including:  Bilateral upper trap stretch 3 x 20 second hold each side  Myofascial release to bilateral upper traps     Adele participated in neuromuscular re-education activities to improve: Posture and scapular stability for 8 minutes. The following activities were included:  Scapular retraction/extension - blue x 20 w/ 3 second hold   Rows 3 plates - low  - x 20 w/ 3 second hold   Bilateral external rotation - yellow  w/ 3 second hold     Adele participated in dynamic functional therapeutic activities to improve functional performance for 0  minutes, including:      Adele received the following direct contact modalities after being cleared for contraindications: Ultrasound/Hivolt Combo:  Adele received  ultrasound/hivolt combo to decrease tone and spasm applied to bilateral upper traps for a duration of 10 minutes. Patient tolerated treatment well without adverse effects. Therapist was in attendance throughout intervention.        Home Exercises Provided and Patient Education Provided     Education provided: n/a    Written Home Exercises Provided: Patient instructed to cont prior HEP.  Exercises were reviewed and Adele was able to demonstrate them prior to the end of the session.  Adele demonstrated good  understanding of the education provided.     See EMR under Patient Instructions for exercises provided prior visit.    Assessment   Evaluation Assessment:  Adele is a 52 y.o. female referred to outpatient Physical Therapy with a medical diagnosis of neck pain. Pt presents with complaints of neck pain that is actually in her upper traps. She has marked increased tone in left upper trap and is very tender to palpation. She has normal cervical range of motion except slight decrease in lateral flexion due mainly to tightness in upper traps. She denies sleep disturbance. She states there are certain things like eating, drinking, and smoking that make her neck swell and that is what increases her pain.     Current Assessment:  Patient arrived for third visit following evaluation.  She received a little deeper myofascial release to left upper trap, levator scapula, and scalenes today. She reported soreness after treatment.  Will progress exercises as tolerated and continue to use modalities as needed.    Adele Is progressing well towards her goals.   Pt prognosis is Good.     Pt will continue to benefit from skilled outpatient physical therapy to address the deficits listed in the problem list box on initial evaluation, provide pt/family education and to maximize pt's level of independence in the home and community environment.     Pt's spiritual, cultural and educational needs considered and pt agreeable to plan  of care and goals.     Anticipated barriers to physical therapy: none    Goals:  Patient will be independent with home exercise program to facilitate carryover between visits.   Patient will demonstrate improved postural awarenss in keeping shoulders back and neck retracted when performing activiites of daily living.  Patient will increase strength in rotator cuff to 5/5 and scapular stabilizers to 4+/5 to aid in goal 2.   Patient will have decreased tone in left upper trap to aid in decreasing her pain to 0/10 for improved quality of life.    Plan     Plan of care Certification: 12/6/2022 to 1/20/2023.     Outpatient Physical Therapy 2 times weekly for 6 weeks to include the following interventions: Cervical/Lumbar Traction, Electrical Stimulation IFC/hivolt/premod as needed, Manual Therapy, Moist Heat/ Ice, Neuromuscular Re-ed, Patient Education, Therapeutic Activities, Therapeutic Exercise, and Ultrasound.     Garima Osuna, PTA  12/15/2022

## 2022-12-20 ENCOUNTER — CLINICAL SUPPORT (OUTPATIENT)
Dept: REHABILITATION | Facility: HOSPITAL | Age: 52
End: 2022-12-20
Payer: COMMERCIAL

## 2022-12-20 DIAGNOSIS — M54.2 NECK PAIN: Primary | ICD-10-CM

## 2022-12-20 DIAGNOSIS — M62.838 MUSCLE SPASM: ICD-10-CM

## 2022-12-20 PROCEDURE — 97140 MANUAL THERAPY 1/> REGIONS: CPT | Mod: CQ

## 2022-12-20 PROCEDURE — 97110 THERAPEUTIC EXERCISES: CPT | Mod: CQ

## 2022-12-20 PROCEDURE — 97112 NEUROMUSCULAR REEDUCATION: CPT | Mod: CQ

## 2022-12-20 NOTE — PROGRESS NOTES
Physical Therapy Treatment Note     [id number]    Therapy Diagnosis:   Encounter Diagnoses   Name Primary?    Neck pain Yes    Muscle spasm        Physician: Yobani Lombardo DO    Visit Date: 12/20/2022    Physician Orders: PT Eval and Treat   Medical Diagnosis from Referral: neck pain  Evaluation Date: 12/6/2022  Authorization Period Expiration: n/a  Plan of Care Expiration: 1/20/2023  Progress Note Due: 1/5/2023  Visit # / Visits authorized: 5/ 20    PTA Visit #: 3    Time In: 7:34 am  Time Out: 8:15 am  Total Billable Time: 40 minutes    Precautions: Standard      Subjective     Pt reports: she had no complaints after last treatment - she just has the swelling all the time. She reports the hivolt makes her feel worse and aggravates her pain.  She was compliant with home exercise program.  Response to previous treatment: aggravated and sore  Functional change: none    Pain: 5/10  Location: bilateral upper traps      Objective     Adele received therapeutic exercises to develop strength, endurance, and flexibility for 11 minutes including:  Upper body ergometer x 6 minutes  Corner stretch 5 x 20 second hold   Levator scapula stretch 3 x 20 second hold     Adele received the following manual therapy techniques: Manual traction, Myofacial release, Soft tissue Mobilization, and passive stretching were applied  for 19 minutes, including:  Bilateral upper trap stretch 3 x 20 second hold each side  Myofascial release to bilateral upper traps   Percussion gun to bilateral upper traps     Adele participated in neuromuscular re-education activities to improve: Posture and scapular stability for 10 minutes. The following activities were included:  Scapular retraction/extension - blue x 20 w/ 3 second hold   Rows 3 plates - low and high  - x 20 w/ 3 second hold   Bilateral external rotation - yellow  w/ 3 second hold x 20    Adele participated in dynamic functional  therapeutic activities to improve functional performance for 0  minutes, including:    Home Exercises Provided and Patient Education Provided     Education provided: n/a    Written Home Exercises Provided: Patient instructed to cont prior HEP.  Exercises were reviewed and Adele was able to demonstrate them prior to the end of the session.  Adele demonstrated good  understanding of the education provided.     See EMR under Patient Instructions for exercises provided prior visit.    Assessment   Evaluation Assessment:  Adele is a 52 y.o. female referred to outpatient Physical Therapy with a medical diagnosis of neck pain. Pt presents with complaints of neck pain that is actually in her upper traps. She has marked increased tone in left upper trap and is very tender to palpation. She has normal cervical range of motion except slight decrease in lateral flexion due mainly to tightness in upper traps. She denies sleep disturbance. She states there are certain things like eating, drinking, and smoking that make her neck swell and that is what increases her pain.     Current Assessment:  Patient asked what the hivolt combo was supposed to do and if she needed it. She stated she would prefer not to use it because it aggravated her pain but if it was necessary she would still do it. She did not receive hivolt since it was not helping. She did receive more manual therapy and had addition of theragun for deep pressure. She reported the pressure felt good and she like the theragun. Reminded patient to stretch frequently at home, especially first thing in morning.    Adele Is progressing well towards her goals.   Pt prognosis is Good.     Pt will continue to benefit from skilled outpatient physical therapy to address the deficits listed in the problem list box on initial evaluation, provide pt/family education and to maximize pt's level of independence in the home and community environment.     Pt's spiritual, cultural  and educational needs considered and pt agreeable to plan of care and goals.     Anticipated barriers to physical therapy: none    Goals:  Patient will be independent with home exercise program to facilitate carryover between visits.   Patient will demonstrate improved postural awarenss in keeping shoulders back and neck retracted when performing activiites of daily living.  Patient will increase strength in rotator cuff to 5/5 and scapular stabilizers to 4+/5 to aid in goal 2.   Patient will have decreased tone in left upper trap to aid in decreasing her pain to 0/10 for improved quality of life.    Plan     Plan of care Certification: 12/6/2022 to 1/20/2023.     Outpatient Physical Therapy 2 times weekly for 6 weeks to include the following interventions: Cervical/Lumbar Traction, Electrical Stimulation IFC/hivolt/premod as needed, Manual Therapy, Moist Heat/ Ice, Neuromuscular Re-ed, Patient Education, Therapeutic Activities, Therapeutic Exercise, and Ultrasound.     Garima Osuna, PTA  12/20/2022

## 2022-12-28 ENCOUNTER — CLINICAL SUPPORT (OUTPATIENT)
Dept: REHABILITATION | Facility: HOSPITAL | Age: 52
End: 2022-12-28
Payer: COMMERCIAL

## 2022-12-28 DIAGNOSIS — M62.838 MUSCLE SPASM: ICD-10-CM

## 2022-12-28 DIAGNOSIS — M54.2 NECK PAIN: Primary | ICD-10-CM

## 2022-12-28 PROCEDURE — 97112 NEUROMUSCULAR REEDUCATION: CPT

## 2022-12-28 PROCEDURE — 97110 THERAPEUTIC EXERCISES: CPT

## 2022-12-28 PROCEDURE — 97140 MANUAL THERAPY 1/> REGIONS: CPT

## 2022-12-28 NOTE — PROGRESS NOTES
Physical Therapy Treatment Note     Name: Adele Solares  [facility] Number: [id number]    Therapy Diagnosis:   Encounter Diagnoses   Name Primary?    Neck pain Yes    Muscle spasm        Physician: Yobani Lombardo DO    Visit Date: 12/28/2022    Physician Orders: PT Eval and Treat   Medical Diagnosis from Referral: neck pain  Evaluation Date: 12/6/2022  Authorization Period Expiration: n/a  Plan of Care Expiration: 1/20/2023  Progress Note Due: 1/5/2023  Visit # / Visits authorized: 6/20    PTA Visit #:     Time In: 7:35 am  Time Out: 8:21 am  Total Billable Time: 46 minutes    Precautions: Standard      Subjective     Pt reports: she is not any better - then says she doesn't hurt until she swells  She was compliant with home exercise program.  Response to previous treatment: aggravated and sore  Functional change: none    Pain: 5/10  Location: bilateral upper traps      Objective     Adele received therapeutic exercises to develop strength, endurance, and flexibility for 13 minutes including:  Upper body ergometer x 5 minutes  Corner stretch 4 x 20 second hold   Levator scapula stretch --  Bilateral upper trap stretch 3 x 20 second hold each side    Adele received the following manual therapy techniques: Manual traction, Myofacial release, Soft tissue Mobilization, and passive stretching were applied  for 17 minutes, including:  Myofascial release to bilateral upper traps (w/emphasis on left)  Percussion gun to bilateral upper traps (w/ emphasis on left)  IASTM to left upper trap with GT#3    Adele participated in neuromuscular re-education activities to improve: Posture and scapular stability for 16 minutes. The following activities were included:  Scapular retraction/extension - blue x 30 w/ 3 second hold   Rows 3 plates - low and high  - x 20 w/ 3 second hold   Bilateral external rotation - red w/ 3 second hold x 20  Bilateral horizontal abduction - red 3 second hold x 20  Upright rows 2 plates  x 20    Adele participated in dynamic functional therapeutic activities to improve functional performance for 0  minutes, including:    Home Exercises Provided and Patient Education Provided     Education provided: n/a    Written Home Exercises Provided: Patient instructed to cont prior HEP.  Exercises were reviewed and Adele was able to demonstrate them prior to the end of the session.  Adele demonstrated good  understanding of the education provided.     See EMR under Patient Instructions for exercises provided prior visit.    Assessment   Evaluation Assessment:  Adele is a 52 y.o. female referred to outpatient Physical Therapy with a medical diagnosis of neck pain. Pt presents with complaints of neck pain that is actually in her upper traps. She has marked increased tone in left upper trap and is very tender to palpation. She has normal cervical range of motion except slight decrease in lateral flexion due mainly to tightness in upper traps. She denies sleep disturbance. She states there are certain things like eating, drinking, and smoking that make her neck swell and that is what increases her pain.     Current Assessment:  Patient states she only hurts when she swells. We added bilateral horizontal abduction and upright rows today. She had palpable increased tone in left upper trap today. This was addressed with MFR, IASTM and percussion gun. This area was much less hypertrophic after manual.     Adele Is progressing well towards her goals.   Pt prognosis is Good.     Pt will continue to benefit from skilled outpatient physical therapy to address the deficits listed in the problem list box on initial evaluation, provide pt/family education and to maximize pt's level of independence in the home and community environment.     Pt's spiritual, cultural and educational needs considered and pt agreeable to plan of care and goals.     Anticipated barriers to physical therapy: none    Goals:  Patient will be  independent with home exercise program to facilitate carryover between visits.   Patient will demonstrate improved postural awarenss in keeping shoulders back and neck retracted when performing activiites of daily living.  Patient will increase strength in rotator cuff to 5/5 and scapular stabilizers to 4+/5 to aid in goal 2.   Patient will have decreased tone in left upper trap to aid in decreasing her pain to 0/10 for improved quality of life.    Plan     Plan of care Certification: 12/6/2022 to 1/20/2023.     Outpatient Physical Therapy 2 times weekly for 6 weeks to include the following interventions: Cervical/Lumbar Traction, Electrical Stimulation IFC/hivolt/premod as needed, Manual Therapy, Moist Heat/ Ice, Neuromuscular Re-ed, Patient Education, Therapeutic Activities, Therapeutic Exercise, and Ultrasound.     RICARDO HERRON, PT  12/28/2022

## 2023-01-04 ENCOUNTER — CLINICAL SUPPORT (OUTPATIENT)
Dept: REHABILITATION | Facility: HOSPITAL | Age: 53
End: 2023-01-04
Payer: COMMERCIAL

## 2023-01-04 DIAGNOSIS — M54.2 NECK PAIN: Primary | ICD-10-CM

## 2023-01-04 DIAGNOSIS — M62.838 MUSCLE SPASM: ICD-10-CM

## 2023-01-04 PROCEDURE — 97140 MANUAL THERAPY 1/> REGIONS: CPT | Mod: CQ

## 2023-01-04 PROCEDURE — 97112 NEUROMUSCULAR REEDUCATION: CPT | Mod: CQ

## 2023-01-04 PROCEDURE — 97110 THERAPEUTIC EXERCISES: CPT | Mod: CQ

## 2023-01-04 NOTE — PROGRESS NOTES
Physical Therapy Treatment Note     Name: Adele Solares  Clinic Number: [id number]    Therapy Diagnosis:   Encounter Diagnoses   Name Primary?    Neck pain Yes    Muscle spasm        Physician: Yobani Lombardo DO    Visit Date: 1/4/2023    Physician Orders: PT Eval and Treat   Medical Diagnosis from Referral: neck pain  Evaluation Date: 12/6/2022  Authorization Period Expiration: n/a  Plan of Care Expiration: 1/20/2023  Progress Note Due: 1/5/2023    Visit # / Visits authorized: 7/20  PTA Visit #: 1/5    Time In: 7:48 am  Time Out: 8:22 am  Total Billable Time: 34 minutes    Precautions: Standard      Subjective     Pt reports: no pain currently but has daily pain in L UT with intermittent R UT pain as well  She was compliant with home exercise program.  Response to previous treatment: aggravated and sore  Functional change: none    Pain: 5/10  Location: bilateral upper traps      Objective     Adele received therapeutic exercises to develop strength, endurance, and flexibility for 15 minutes including:  Upper body ergometer x 5 minutes  Corner stretch 4 x 20 second hold   Levator scapula stretch --  Bilateral upper trap stretch 3 x 20 second hold each side    Adele received the following manual therapy techniques: Manual traction, Myofacial release, Soft tissue Mobilization, and passive stretching were applied  for 9 minutes, including:  Myofascial release to bilateral upper traps (w/emphasis on left)  Percussion gun to bilateral upper traps (w/ emphasis on left)  IASTM to left upper trap with GT#3    Adele participated in neuromuscular re-education activities to improve: Posture and scapular stability for 10 minutes. The following activities were included:  Scapular retraction/extension - blue x 30 w/ 3 second hold   Rows 3 plates - low and high  - x 20 w/ 3 second hold   Bilateral external rotation - red w/ 3 second hold x 20  Bilateral horizontal abduction - red 3 second hold x 20  Upright  rows 2 plates x 20    Adele participated in dynamic functional therapeutic activities to improve functional performance for 0  minutes, including:    Home Exercises Provided and Patient Education Provided     Education provided: n/a    Written Home Exercises Provided: Patient instructed to cont prior HEP.  Exercises were reviewed and Adele was able to demonstrate them prior to the end of the session.  Adele demonstrated good  understanding of the education provided.     See EMR under Patient Instructions for exercises provided prior visit.    Assessment   Evaluation Assessment:  Adele is a 52 y.o. female referred to outpatient Physical Therapy with a medical diagnosis of neck pain. Pt presents with complaints of neck pain that is actually in her upper traps. She has marked increased tone in left upper trap and is very tender to palpation. She has normal cervical range of motion except slight decrease in lateral flexion due mainly to tightness in upper traps. She denies sleep disturbance. She states there are certain things like eating, drinking, and smoking that make her neck swell and that is what increases her pain.     Current Assessment:  Pt is TTP along posterior cervical spine around UT insertion. Pt has pain with manual UT/SCM stretching. Relief of symptoms with manual traction and UT release. Progressed light scapular exercises today with fatigue noted.     Adele Is progressing well towards her goals.   Pt prognosis is Good.     Pt will continue to benefit from skilled outpatient physical therapy to address the deficits listed in the problem list box on initial evaluation, provide pt/family education and to maximize pt's level of independence in the home and community environment.     Pt's spiritual, cultural and educational needs considered and pt agreeable to plan of care and goals.     Anticipated barriers to physical therapy: none    Goals:  Patient will be independent with home exercise  program to facilitate carryover between visits.   Patient will demonstrate improved postural awarenss in keeping shoulders back and neck retracted when performing activiites of daily living.  Patient will increase strength in rotator cuff to 5/5 and scapular stabilizers to 4+/5 to aid in goal 2.   Patient will have decreased tone in left upper trap to aid in decreasing her pain to 0/10 for improved quality of life.    Plan     Plan of care Certification: 12/6/2022 to 1/20/2023.     Outpatient Physical Therapy 2 times weekly for 6 weeks to include the following interventions: Cervical/Lumbar Traction, Electrical Stimulation IFC/hivolt/premod as needed, Manual Therapy, Moist Heat/ Ice, Neuromuscular Re-ed, Patient Education, Therapeutic Activities, Therapeutic Exercise, and Ultrasound.     Primitivo Brantley, PTA  1/4/2023               Deferred

## 2023-01-06 ENCOUNTER — HOSPITAL ENCOUNTER (OUTPATIENT)
Dept: RADIOLOGY | Facility: HOSPITAL | Age: 53
Discharge: HOME | End: 2023-01-06
Payer: COMMERCIAL

## 2023-01-06 DIAGNOSIS — Z12.31 BREAST CANCER SCREENING BY MAMMOGRAM: ICD-10-CM

## 2023-01-06 PROCEDURE — 77067 SCR MAMMO BI INCL CAD: CPT | Mod: TC

## 2023-01-10 ENCOUNTER — CLINICAL SUPPORT (OUTPATIENT)
Dept: REHABILITATION | Facility: HOSPITAL | Age: 53
End: 2023-01-10
Payer: COMMERCIAL

## 2023-01-10 DIAGNOSIS — M62.838 MUSCLE SPASM: ICD-10-CM

## 2023-01-10 DIAGNOSIS — M54.2 NECK PAIN: Primary | ICD-10-CM

## 2023-01-10 PROCEDURE — 97110 THERAPEUTIC EXERCISES: CPT

## 2023-01-10 PROCEDURE — 97112 NEUROMUSCULAR REEDUCATION: CPT

## 2023-01-10 PROCEDURE — 97140 MANUAL THERAPY 1/> REGIONS: CPT

## 2023-01-10 NOTE — PROGRESS NOTES
Physical Therapy Progress Note     Name: Adele Solares  Clinic Number: [id number]    Therapy Diagnosis:   Encounter Diagnoses   Name Primary?    Neck pain Yes    Muscle spasm        Physician: Yobani Lombardo DO    Visit Date: 1/10/2023    Physician Orders: PT Eval and Treat   Medical Diagnosis from Referral: neck pain  Evaluation Date: 12/6/2022  Authorization Period Expiration: n/a  Plan of Care Expiration: 1/20/2023  Progress Note Due: --    Visit # / Visits authorized: 8/20  PTA Visit #:     Time In: 7:38 am  Time Out: 8:01 am  Total Billable Time: 53 minutes    Precautions: Standard      Subjective     Pt reports: she is not any better - says her left side of her neck still swells  She was compliant with home exercise program.  Response to previous treatment: aggravated and sore  Functional change: none    Pain: 2/10  Location: bilateral upper traps      Objective     Adele received therapeutic exercises to develop strength, endurance, and flexibility for 15 minutes including:  Upper body ergometer x 5 minutes  Corner stretch 4 x 20 second hold   Levator scapula stretch --  Bilateral upper trap stretch 3 x 20 second hold each side    Adele received the following manual therapy techniques: Manual traction, Myofacial release, Soft tissue Mobilization, and passive stretching were applied for 14 minutes, including:  Myofascial release to left upper trap  Percussion gun to left upper trap  IASTM to left upper trap with GT #3 & 4    Adele participated in neuromuscular re-education activities to improve: Posture and scapular stability for 24 minutes. The following activities were included:  Scapular retraction/extension - blue x 30 w/ 3 second hold   Rows 3 plates - low and high  - x 20 w/ 3 second hold   Bilateral external rotation - red w/ 3 second hold x 20  Bilateral horizontal abduction - red 3 second hold x 20  Upright rows 2 plates x 20    Adele participated in dynamic functional  therapeutic activities to improve functional performance for 0  minutes, including:    Home Exercises Provided and Patient Education Provided     Education provided: n/a    Written Home Exercises Provided: Patient instructed to cont prior HEP.  Exercises were reviewed and Adele was able to demonstrate them prior to the end of the session.  Adele demonstrated good  understanding of the education provided.     See EMR under Patient Instructions for exercises provided prior visit.    Assessment   Evaluation Assessment:  Adele is a 52 y.o. female referred to outpatient Physical Therapy with a medical diagnosis of neck pain. Pt presents with complaints of neck pain that is actually in her upper traps. She has marked increased tone in left upper trap and is very tender to palpation. She has normal cervical range of motion except slight decrease in lateral flexion due mainly to tightness in upper traps. She denies sleep disturbance. She states there are certain things like eating, drinking, and smoking that make her neck swell and that is what increases her pain.     Current Assessment:  Pt continues to complain of swelling on left side of neck. The left side just above her clavicle is puffier than the right. She continues to have increased tone in left upper trap compared to the right. She says the right upper trap has not been bothering her but the left side continues to. Uncertain of the cause of the swelling between the clavicle and left upper trap. Advised her to follow up with her PCP about this continued swelling.      Adele Is progressing well towards her goals.   Pt prognosis is Good.     Pt will continue to benefit from skilled outpatient physical therapy to address the deficits listed in the problem list box on initial evaluation, provide pt/family education and to maximize pt's level of independence in the home and community environment.     Pt's spiritual, cultural and educational needs considered and  pt agreeable to plan of care and goals.     Anticipated barriers to physical therapy: none    Goals:  Patient will be independent with home exercise program to facilitate carryover between visits. MET  Patient will demonstrate improved postural awarenss in keeping shoulders back and neck retracted when performing activiites of daily living. IMPROVING  Patient will increase strength in rotator cuff to 5/5 and scapular stabilizers to 4+/5 to aid in goal 2. IMPROVING/ONGOING - she still fatigues easy with therex  Patient will have decreased tone in left upper trap to aid in decreasing her pain to 0/10 for improved quality of life. IMPROVING - left upper trap spasm has decreased    Plan     Plan of care Certification: 12/6/2022 to 1/20/2023.     Outpatient Physical Therapy 2 times weekly for 6 weeks to include the following interventions: Cervical/Lumbar Traction, Electrical Stimulation IFC/hivolt/premod as needed, Manual Therapy, Moist Heat/ Ice, Neuromuscular Re-ed, Patient Education, Therapeutic Activities, Therapeutic Exercise, and Ultrasound.     RICARDO HERRON, PT  1/10/2023

## 2023-01-17 ENCOUNTER — CLINICAL SUPPORT (OUTPATIENT)
Dept: REHABILITATION | Facility: HOSPITAL | Age: 53
End: 2023-01-17
Payer: COMMERCIAL

## 2023-01-17 DIAGNOSIS — M54.2 NECK PAIN: Primary | ICD-10-CM

## 2023-01-17 DIAGNOSIS — M62.838 MUSCLE SPASM: ICD-10-CM

## 2023-01-17 PROCEDURE — 97112 NEUROMUSCULAR REEDUCATION: CPT

## 2023-01-17 PROCEDURE — 97012 MECHANICAL TRACTION THERAPY: CPT

## 2023-01-17 PROCEDURE — 97110 THERAPEUTIC EXERCISES: CPT

## 2023-01-17 NOTE — PROGRESS NOTES
Physical Therapy Treatment Note     Name: Adele Hidalgott  Clinic Number: [id number]    Therapy Diagnosis:   Encounter Diagnoses   Name Primary?    Neck pain Yes    Muscle spasm        Physician: Yobani Lombardo DO    Visit Date: 1/17/2023    Physician Orders: PT Eval and Treat   Medical Diagnosis from Referral: neck pain  Evaluation Date: 12/6/2022  Authorization Period Expiration: n/a  Plan of Care Expiration: 1/20/2023      Visit # / Visits authorized: 9/20  PTA Visit #:     Time In: 7:35 am  Time Out: 8:25 am  Total Billable Time: 47 minutes    Precautions: Standard      Subjective     Pt reports: no change in her pain or swelling on the left side of her neck  She was compliant with home exercise program.  Response to previous treatment: aggravated and sore  Functional change: none    Pain: 2/10  Location: bilateral upper traps      Objective     Adele received therapeutic exercises to develop strength, endurance, and flexibility for 8 minutes including:  Upper body ergometer x 5 minutes  Corner stretch 4 x 20 second hold   Levator scapula stretch --  Bilateral upper trap stretch --    Adele received the following manual therapy techniques: Manual traction, Myofacial release, Soft tissue Mobilization, and passive stretching were applied for 0 minutes, including:  Myofascial release to left upper trap  Percussion gun to left upper trap  IASTM to left upper trap with GT #3 & 4    dAele participated in neuromuscular re-education activities to improve: Posture and scapular stability for 24 minutes. The following activities were included:  Scapular retraction/extension - blue x 30 w/ 3 second hold   Rows 3 plates - low and high  - x 20 w/ 3 second hold   Bilateral external rotation - red w/ 3 second hold x 30  Bilateral horizontal abduction - red 3 second hold x 20  Upright rows 2 plates x 25    Adele participated in dynamic functional therapeutic activities to improve functional performance for  0  minutes, including:    Adele received mechanical cervical traction for 15 minutes with 20# pull and 45 seconds on/ 15 seconds off.    Home Exercises Provided and Patient Education Provided     Education provided: n/a    Written Home Exercises Provided: Patient instructed to cont prior HEP.  Exercises were reviewed and Adele was able to demonstrate them prior to the end of the session.  Adele demonstrated good  understanding of the education provided.     See EMR under Patient Instructions for exercises provided prior visit.    Assessment   Evaluation Assessment:  Adele is a 52 y.o. female referred to outpatient Physical Therapy with a medical diagnosis of neck pain. Pt presents with complaints of neck pain that is actually in her upper traps. She has marked increased tone in left upper trap and is very tender to palpation. She has normal cervical range of motion except slight decrease in lateral flexion due mainly to tightness in upper traps. She denies sleep disturbance. She states there are certain things like eating, drinking, and smoking that make her neck swell and that is what increases her pain.     Current Assessment:  Pt was able to increase reps with bilateral horizontal abduction and upright rows. She continues to have pain/achiness in bilateral upper arms but left is worse. Ended treatment with cervical traction today. She voiced good relief afterwards. Will continue to utilize this modality in conjuction with therex and neuro re-ed activities.      Adele Is progressing well towards her goals.   Pt prognosis is Good.     Pt will continue to benefit from skilled outpatient physical therapy to address the deficits listed in the problem list box on initial evaluation, provide pt/family education and to maximize pt's level of independence in the home and community environment.     Pt's spiritual, cultural and educational needs considered and pt agreeable to plan of care and goals.      Anticipated barriers to physical therapy: none    Goals:  Patient will be independent with home exercise program to facilitate carryover between visits. MET  Patient will demonstrate improved postural awarenss in keeping shoulders back and neck retracted when performing activiites of daily living. IMPROVING  Patient will increase strength in rotator cuff to 5/5 and scapular stabilizers to 4+/5 to aid in goal 2. IMPROVING/ONGOING - she still fatigues easy with therex  Patient will have decreased tone in left upper trap to aid in decreasing her pain to 0/10 for improved quality of life. IMPROVING - left upper trap spasm has decreased    Plan     Plan of care Certification: 12/6/2022 to 1/20/2023.     Outpatient Physical Therapy 2 times weekly for 6 weeks to include the following interventions: Cervical/Lumbar Traction, Electrical Stimulation IFC/hivolt/premod as needed, Manual Therapy, Moist Heat/ Ice, Neuromuscular Re-ed, Patient Education, Therapeutic Activities, Therapeutic Exercise, and Ultrasound.     RICARDO HERRON, PT  1/17/2023

## 2023-01-19 ENCOUNTER — CLINICAL SUPPORT (OUTPATIENT)
Dept: REHABILITATION | Facility: HOSPITAL | Age: 53
End: 2023-01-19
Payer: COMMERCIAL

## 2023-01-19 DIAGNOSIS — M54.2 NECK PAIN: Primary | ICD-10-CM

## 2023-01-19 DIAGNOSIS — M62.838 MUSCLE SPASM: ICD-10-CM

## 2023-01-19 PROCEDURE — 97110 THERAPEUTIC EXERCISES: CPT

## 2023-01-19 PROCEDURE — 97112 NEUROMUSCULAR REEDUCATION: CPT

## 2023-01-19 PROCEDURE — 97012 MECHANICAL TRACTION THERAPY: CPT

## 2023-01-19 NOTE — PROGRESS NOTES
Physical Therapy Treatment Note     Name: Adele Hidalgott  Clinic Number: [id number]    Therapy Diagnosis:   Encounter Diagnoses   Name Primary?    Neck pain Yes    Muscle spasm        Physician: Yobani Lombardo DO    Visit Date: 1/19/2023    Physician Orders: PT Eval and Treat   Medical Diagnosis from Referral: neck pain  Evaluation Date: 12/6/2022  Authorization Period Expiration: 12/31/2023  Plan of Care Expiration: 1/20/2023      Visit # / Visits authorized: 10/20  PTA Visit #:     Time In: 7:45 am  Time Out: 8:30 am  Total Billable Time: 45 minutes    Precautions: Standard      Subjective     Pt reports: no change in her pain or swelling on the left side of her neck  She was compliant with home exercise program.  Response to previous treatment: aggravated and sore  Functional change: none    Pain: 2/10  Location: bilateral upper traps      Objective     Adele received therapeutic exercises to develop strength, endurance, and flexibility for 8 minutes including:  Upper body ergometer x 5 minutes  Corner stretch 4 x 20 second hold   Levator scapula stretch --  Bilateral upper trap stretch --    Adele received the following manual therapy techniques: Manual traction, Myofacial release, Soft tissue Mobilization, and passive stretching were applied for 0 minutes, including:  Myofascial release to left upper trap  Percussion gun to left upper trap  IASTM to left upper trap with GT #3 & 4    Adele participated in neuromuscular re-education activities to improve: Posture and scapular stability for 22 minutes. The following activities were included:  Scapular retraction/extension - blue x 30 w/ 3 second hold   Rows 3 plates - low and high  - x 20 w/ 3 second hold   Bilateral external rotation - red w/ 3 second hold x 30  Bilateral horizontal abduction - red 3 second hold x 20  Upright rows 2 plates x 25    Adele participated in dynamic functional therapeutic activities to improve functional  performance for 0  minutes, including:    Adele received mechanical cervical traction for 15 minutes with 20# pull and 45 seconds on/ 15 seconds off.    Home Exercises Provided and Patient Education Provided     Education provided: n/a    Written Home Exercises Provided: Patient instructed to cont prior HEP.  Exercises were reviewed and Adele was able to demonstrate them prior to the end of the session.  Adele demonstrated good  understanding of the education provided.     See EMR under Patient Instructions for exercises provided prior visit.    Assessment   Evaluation Assessment:  Adele is a 52 y.o. female referred to outpatient Physical Therapy with a medical diagnosis of neck pain. Pt presents with complaints of neck pain that is actually in her upper traps. She has marked increased tone in left upper trap and is very tender to palpation. She has normal cervical range of motion except slight decrease in lateral flexion due mainly to tightness in upper traps. She denies sleep disturbance. She states there are certain things like eating, drinking, and smoking that make her neck swell and that is what increases her pain.     Current Assessment:  Pt voiced good relief from cervical traction last visit. Still focusing on posture and scapular stability as well. Ended with traction again today at treatment end. She reported she felt better at treatment end. Plan of care updated today.    Adele Is progressing well towards her goals.   Pt prognosis is Good.     Pt will continue to benefit from skilled outpatient physical therapy to address the deficits listed in the problem list box on initial evaluation, provide pt/family education and to maximize pt's level of independence in the home and community environment.     Pt's spiritual, cultural and educational needs considered and pt agreeable to plan of care and goals.     Anticipated barriers to physical therapy: none    Goals:  Patient will be independent with  home exercise program to facilitate carryover between visits. MET  Patient will demonstrate improved postural awarenss in keeping shoulders back and neck retracted when performing activiites of daily living. IMPROVING  Patient will increase strength in rotator cuff to 5/5 and scapular stabilizers to 4+/5 to aid in goal 2. IMPROVING/ONGOING - she still fatigues easy with therex  Patient will have decreased tone in left upper trap to aid in decreasing her pain to 0/10 for improved quality of life. IMPROVING - left upper trap spasm has decreased    Plan     Updated Certification Period: 1/20/2023 to 2/17/2023  Recommended Treatment Plan: 2 times per week for 4 weeks: Cervical/Lumbar Traction, Electrical Stimulation IFC/premod/hivolt as needed, Manual Therapy, Neuromuscular Re-ed, Patient Education, Therapeutic Activities, Therapeutic Exercise, and Ultrasound  Other Recommendations: use cervical traction    RICARDO HERRON, PT  1/19/2023

## 2023-01-19 NOTE — PLAN OF CARE
Physical Therapy Updated Plan of Care     Name: Adele Solares  [facility] Number: [id number]    Therapy Diagnosis:   Encounter Diagnoses   Name Primary?    Neck pain Yes    Muscle spasm        Physician: Yobani Lombardo DO    Visit Date: 1/19/2023    Physician Orders: PT Eval and Treat   Medical Diagnosis from Referral: neck pain  Evaluation Date: 12/6/2022  Authorization Period Expiration: 12/31/2023  Plan of Care Expiration: 1/20/2023    See daily note for today's physical therapy treatment.    Reasons for Recertification of Therapy: Patient has been continuing to complain of swelling on left side of neck/upper trap. This area is puffier than the right sided. We started using cervical traction this week which patient is getting relief from so we will continue physical therapy to add this to her plan of care. We will continue to work on posture and scapular stability as well.    Plan     Updated Certification Period: 1/20/2023 to 2/17/2023  Recommended Treatment Plan: 2 times per week for 4 weeks: Cervical/Lumbar Traction, Electrical Stimulation IFC/premod/hivolt as needed, Manual Therapy, Neuromuscular Re-ed, Patient Education, Therapeutic Activities, Therapeutic Exercise, and Ultrasound  Other Recommendations: use cervical traction    RICARDO HERRON, PT  1/19/2023      I CERTIFY THE NEED FOR THESE SERVICES FURNISHED UNDER THIS PLAN OF TREATMENT AND WHILE UNDER MY CARE.    Physician's comments:      Physician's Signature: ___________________________________________________

## 2023-01-25 ENCOUNTER — HOSPITAL ENCOUNTER (EMERGENCY)
Facility: HOSPITAL | Age: 53
Discharge: HOME | End: 2023-01-25
Payer: COMMERCIAL

## 2023-01-25 VITALS
TEMPERATURE: 99 F | OXYGEN SATURATION: 97 % | BODY MASS INDEX: 28.72 KG/M2 | RESPIRATION RATE: 18 BRPM | HEART RATE: 110 BPM | SYSTOLIC BLOOD PRESSURE: 113 MMHG | WEIGHT: 183 LBS | HEIGHT: 67 IN | DIASTOLIC BLOOD PRESSURE: 72 MMHG

## 2023-01-25 DIAGNOSIS — U07.1 COVID-19: Primary | ICD-10-CM

## 2023-01-25 LAB
FLUAV AG UPPER RESP QL IA.RAPID: NEGATIVE
FLUBV AG UPPER RESP QL IA.RAPID: NEGATIVE
SARS-COV+SARS-COV-2 AG RESP QL IA.RAPID: POSITIVE

## 2023-01-25 PROCEDURE — 25000003 PHARM REV CODE 250: Performed by: NURSE PRACTITIONER

## 2023-01-25 PROCEDURE — 99283 EMERGENCY DEPT VISIT LOW MDM: CPT | Mod: ,,, | Performed by: NURSE PRACTITIONER

## 2023-01-25 PROCEDURE — 99283 EMERGENCY DEPT VISIT LOW MDM: CPT

## 2023-01-25 PROCEDURE — 87428 SARSCOV & INF VIR A&B AG IA: CPT | Performed by: NURSE PRACTITIONER

## 2023-01-25 PROCEDURE — 99283 PR EMERGENCY DEPT VISIT,LEVEL III: ICD-10-PCS | Mod: ,,, | Performed by: NURSE PRACTITIONER

## 2023-01-25 RX ORDER — ACETAMINOPHEN 500 MG
1000 TABLET ORAL
Status: COMPLETED | OUTPATIENT
Start: 2023-01-25 | End: 2023-01-25

## 2023-01-25 RX ADMIN — ACETAMINOPHEN 1000 MG: 500 TABLET ORAL at 11:01

## 2023-01-25 NOTE — ED PROVIDER NOTES
Encounter Date: 1/25/2023       History     Chief Complaint   Patient presents with    Fatigue     Pt c/o body weakness and bodyaches that started yesterday. States her son has covid     52-year-old male presents to the emergency department to be evaluated for fever, body aches, nasal congestion that began yesterday.  She has been around her son who recently tested positive for COVID.  Denies any chest pain, shortness of breath, nausea, vomiting, diarrhea, abdominal pain.    The history is provided by the patient.   URI  The primary symptoms include fever, fatigue and myalgias. Primary symptoms do not include headaches, ear pain, sore throat, swollen glands, cough, wheezing, abdominal pain, nausea, vomiting, arthralgias or rash.   Symptoms associated with the illness include congestion and rhinorrhea.   Review of patient's allergies indicates:  No Known Allergies  Past Medical History:   Diagnosis Date    Hypertension     Thyroid disease      Past Surgical History:   Procedure Laterality Date    HYSTERECTOMY       History reviewed. No pertinent family history.  Social History     Tobacco Use    Smoking status: Every Day     Packs/day: 0.50     Years: 30.00     Pack years: 15.00     Types: Cigarettes    Smokeless tobacco: Never   Substance Use Topics    Alcohol use: Not Currently    Drug use: Never     Review of Systems   Constitutional:  Positive for fatigue and fever.   HENT:  Positive for congestion and rhinorrhea. Negative for ear pain and sore throat.    Respiratory:  Negative for cough and wheezing.    Gastrointestinal:  Negative for abdominal pain, nausea and vomiting.   Musculoskeletal:  Positive for myalgias. Negative for arthralgias.   Skin:  Negative for rash.   Neurological:  Negative for headaches.   All other systems reviewed and are negative.    Physical Exam     Initial Vitals [01/25/23 1033]   BP Pulse Resp Temp SpO2   128/73 (!) 115 19 100 °F (37.8 °C) 96 %      MAP       --         Physical  Exam    Vitals reviewed.  Constitutional: She appears well-developed and well-nourished.   Neck: Neck supple.   Cardiovascular:            Tachycardic   Pulmonary/Chest: Breath sounds normal.   Abdominal: Abdomen is soft. Bowel sounds are normal. She exhibits no distension and no mass. There is no abdominal tenderness. There is no rebound and no guarding.   Musculoskeletal:         General: Normal range of motion.      Cervical back: Neck supple.     Neurological: She is alert and oriented to person, place, and time. She has normal strength. GCS score is 15. GCS eye subscore is 4. GCS verbal subscore is 5. GCS motor subscore is 6.   Skin: Skin is warm and dry. Capillary refill takes less than 2 seconds.   Psychiatric: She has a normal mood and affect.       Medical Screening Exam   See Full Note    ED Course   Procedures  Labs Reviewed   SARS-COV2 (COVID) W/ FLU ANTIGEN - Abnormal; Notable for the following components:       Result Value    COVID-19 Ag Positive (*)     All other components within normal limits    Narrative:     Positive SARS-CoV antigen results indicate the presence of viral antigens; correlation with patient history and presence of clinical signs & symptoms consistent with COVID-19 are necessary to determine infection status.          Imaging Results    None          Medications   acetaminophen tablet 1,000 mg (1,000 mg Oral Given 1/25/23 1154)                       Clinical Impression:   Final diagnoses:  [U07.1] COVID-19 (Primary)        ED Disposition Condition    Discharge Stable          ED Prescriptions       Medication Sig Dispense Start Date End Date Auth. Provider    nirmatrelvir-ritonavir 300 mg (150 mg x 2)-100 mg copackaged tablets (EUA) Take 3 tablets by mouth 2 (two) times daily for 5 days. Each dose contains 2 nirmatrelvir (pink tablets) and 1 ritonavir (white tablet). Take all 3 tablets together 30 tablet 1/25/2023 1/30/2023 BUCK Gaston          Follow-up Information     None          Shayy Garcia, [facility]  01/25/23 1225

## 2023-01-25 NOTE — PROVIDER PROGRESS NOTES - EMERGENCY DEPT.
Encounter Date: 1/25/2023    ED Physician Progress Notes        MDM  52-year-old male presents to the emergency department to be evaluated for fever, body aches, nasal congestion that began yesterday.  She has been around her son who recently tested positive for COVID.  Denies any chest pain, shortness of breath, nausea, vomiting, diarrhea, abdominal pain.  Patient tested positive for COVID, flu test was negative.  Diagnosis is COVID-19.  Patient was given a prescription for Paxlovid.  Patient was discharged in stable condition.  Detailed return precautions discussed.

## 2023-01-25 NOTE — Clinical Note
"Adele Lopeza" Beck was seen and treated in our emergency department on 1/25/2023.  She may return to work on 01/30/2023.       If you have any questions or concerns, please don't hesitate to call.      KELLY Kang RN    "

## 2023-03-14 ENCOUNTER — TELEPHONE (OUTPATIENT)
Dept: INTERNAL MEDICINE | Facility: CLINIC | Age: 53
End: 2023-03-14
Payer: COMMERCIAL

## 2023-04-04 ENCOUNTER — CLINICAL SUPPORT (OUTPATIENT)
Dept: PRIMARY CARE CLINIC | Facility: CLINIC | Age: 53
End: 2023-04-04

## 2023-04-04 DIAGNOSIS — Z02.89 ENCOUNTER FOR PHYSICAL EXAMINATION RELATED TO EMPLOYMENT: Primary | ICD-10-CM

## 2023-04-04 NOTE — PROGRESS NOTES
Subjective     Patient ID: Adele Solares is a 52 y.o. female.    Chief Complaint: No chief complaint on file.    HPI  Review of Systems       Objective     Physical Exam       Assessment and Plan     Problem List Items Addressed This Visit    None  Visit Diagnoses       Encounter for physical examination related to employment    -  Primary            Non DOT physical. No charges, community service. [facility]

## 2023-04-16 ENCOUNTER — OFFICE VISIT (OUTPATIENT)
Dept: FAMILY MEDICINE | Facility: CLINIC | Age: 53
End: 2023-04-16
Payer: COMMERCIAL

## 2023-04-16 VITALS
DIASTOLIC BLOOD PRESSURE: 88 MMHG | OXYGEN SATURATION: 96 % | WEIGHT: 183.38 LBS | TEMPERATURE: 99 F | BODY MASS INDEX: 28.78 KG/M2 | HEIGHT: 67 IN | SYSTOLIC BLOOD PRESSURE: 116 MMHG | HEART RATE: 66 BPM | RESPIRATION RATE: 16 BRPM

## 2023-04-16 DIAGNOSIS — J32.9 SINUSITIS, UNSPECIFIED CHRONICITY, UNSPECIFIED LOCATION: Primary | ICD-10-CM

## 2023-04-16 DIAGNOSIS — R42 VERTIGO: ICD-10-CM

## 2023-04-16 PROCEDURE — 3074F SYST BP LT 130 MM HG: CPT | Mod: ,,, | Performed by: FAMILY MEDICINE

## 2023-04-16 PROCEDURE — 99051 PR MEDICAL SERVICES, EVE/WKEND/HOLIDAY: ICD-10-PCS | Mod: ,,, | Performed by: FAMILY MEDICINE

## 2023-04-16 PROCEDURE — 3079F PR MOST RECENT DIASTOLIC BLOOD PRESSURE 80-89 MM HG: ICD-10-PCS | Mod: ,,, | Performed by: FAMILY MEDICINE

## 2023-04-16 PROCEDURE — 3008F BODY MASS INDEX DOCD: CPT | Mod: ,,, | Performed by: FAMILY MEDICINE

## 2023-04-16 PROCEDURE — 3079F DIAST BP 80-89 MM HG: CPT | Mod: ,,, | Performed by: FAMILY MEDICINE

## 2023-04-16 PROCEDURE — 1160F RVW MEDS BY RX/DR IN RCRD: CPT | Mod: ,,, | Performed by: FAMILY MEDICINE

## 2023-04-16 PROCEDURE — 99051 MED SERV EVE/WKEND/HOLIDAY: CPT | Mod: ,,, | Performed by: FAMILY MEDICINE

## 2023-04-16 PROCEDURE — 99213 OFFICE O/P EST LOW 20 MIN: CPT | Mod: 25,,, | Performed by: FAMILY MEDICINE

## 2023-04-16 PROCEDURE — 3008F PR BODY MASS INDEX (BMI) DOCUMENTED: ICD-10-PCS | Mod: ,,, | Performed by: FAMILY MEDICINE

## 2023-04-16 PROCEDURE — 96372 PR INJECTION,THERAP/PROPH/DIAG2ST, IM OR SUBCUT: ICD-10-PCS | Mod: ,,, | Performed by: FAMILY MEDICINE

## 2023-04-16 PROCEDURE — 1160F PR REVIEW ALL MEDS BY PRESCRIBER/CLIN PHARMACIST DOCUMENTED: ICD-10-PCS | Mod: ,,, | Performed by: FAMILY MEDICINE

## 2023-04-16 PROCEDURE — 3074F PR MOST RECENT SYSTOLIC BLOOD PRESSURE < 130 MM HG: ICD-10-PCS | Mod: ,,, | Performed by: FAMILY MEDICINE

## 2023-04-16 PROCEDURE — 99213 PR OFFICE/OUTPT VISIT, EST, LEVL III, 20-29 MIN: ICD-10-PCS | Mod: 25,,, | Performed by: FAMILY MEDICINE

## 2023-04-16 PROCEDURE — 1159F MED LIST DOCD IN RCRD: CPT | Mod: ,,, | Performed by: FAMILY MEDICINE

## 2023-04-16 PROCEDURE — 1159F PR MEDICATION LIST DOCUMENTED IN MEDICAL RECORD: ICD-10-PCS | Mod: ,,, | Performed by: FAMILY MEDICINE

## 2023-04-16 PROCEDURE — 96372 THER/PROPH/DIAG INJ SC/IM: CPT | Mod: ,,, | Performed by: FAMILY MEDICINE

## 2023-04-16 RX ORDER — PREDNISONE 20 MG/1
20 TABLET ORAL DAILY
Qty: 5 TABLET | Refills: 0 | Status: SHIPPED | OUTPATIENT
Start: 2023-04-16 | End: 2023-04-21

## 2023-04-16 RX ORDER — CEFTRIAXONE 500 MG/1
500 INJECTION, POWDER, FOR SOLUTION INTRAMUSCULAR; INTRAVENOUS
Status: COMPLETED | OUTPATIENT
Start: 2023-04-16 | End: 2023-04-16

## 2023-04-16 RX ORDER — MECLIZINE HYDROCHLORIDE 25 MG/1
25 TABLET ORAL 3 TIMES DAILY PRN
Qty: 20 TABLET | Refills: 0 | Status: SHIPPED | OUTPATIENT
Start: 2023-04-16 | End: 2023-05-30

## 2023-04-16 RX ORDER — AMOXICILLIN AND CLAVULANATE POTASSIUM 875; 125 MG/1; MG/1
1 TABLET, FILM COATED ORAL 2 TIMES DAILY
Qty: 14 TABLET | Refills: 0 | Status: SHIPPED | OUTPATIENT
Start: 2023-04-16 | End: 2023-04-23

## 2023-04-16 RX ORDER — DEXAMETHASONE SODIUM PHOSPHATE 4 MG/ML
4 INJECTION, SOLUTION INTRA-ARTICULAR; INTRALESIONAL; INTRAMUSCULAR; INTRAVENOUS; SOFT TISSUE
Status: COMPLETED | OUTPATIENT
Start: 2023-04-16 | End: 2023-04-16

## 2023-04-16 RX ADMIN — DEXAMETHASONE SODIUM PHOSPHATE 4 MG: 4 INJECTION, SOLUTION INTRA-ARTICULAR; INTRALESIONAL; INTRAMUSCULAR; INTRAVENOUS; SOFT TISSUE at 01:04

## 2023-04-16 RX ADMIN — CEFTRIAXONE 500 MG: 500 INJECTION, POWDER, FOR SOLUTION INTRAMUSCULAR; INTRAVENOUS at 01:04

## 2023-04-16 NOTE — LETTER
April 16, 2023      [facility]  [address] [facility] [place] [address]  Phone: [phone]  Fax: [phone]       Patient: Adele Solares   YOB: 1970  Date of Visit: 04/16/2023    To Whom It May Concern:    Twin Solares  was at [facility] on 04/16/2023. The patient may return to work/school on 04/17/2023 with no restrictions. If you have any questions or concerns, or if I can be of further assistance, please do not hesitate to contact me.    Sincerely,    Mookie Mcgrath RN     
HbA1c 9.5 and currently fingersticks are fluctuant and high secondary to gastroparesis and poor absorption of food.  May need adjustment of basal and bolus rates.  Dual bolus will be a better way to tide over gastroparesis induced fluctuating blood glucose.  Meals to be given/taken as much possible in scheduled timings. This will eliminate any fluctuations in blood glucose and help lower the incidence of hypoglycemia   Thank You for the courtesy of this consultation !!!

## 2023-04-16 NOTE — PROGRESS NOTES
Subjective:       Patient ID: Adele Solares is a 52 y.o. female.    Chief Complaint: Headache, Dizziness (Patient is having chills,), Neck Pain, Fatigue, Generalized Body Aches, and Nasal Congestion (Patient has been having all these symptoms since Thursday.symptoms are like hit and miss days comes and goes type .)    HPI  Review of Systems   Constitutional:  Negative for activity change, appetite change, chills, diaphoresis, fatigue, fever and unexpected weight change.   HENT:  Positive for nasal congestion, postnasal drip, rhinorrhea, sinus pressure/congestion and sore throat. Negative for dental problem, drooling, ear discharge, ear pain, facial swelling, hearing loss, mouth sores, nosebleeds, sneezing, tinnitus, trouble swallowing, voice change and goiter.    Eyes:  Negative for photophobia, discharge, itching and visual disturbance.   Respiratory:  Positive for cough. Negative for apnea, choking, chest tightness, shortness of breath, wheezing and stridor.    Cardiovascular:  Negative for chest pain, palpitations, leg swelling and claudication.   Gastrointestinal:  Negative for abdominal distention, abdominal pain, anal bleeding, blood in stool, change in bowel habit, constipation, diarrhea, nausea, vomiting and change in bowel habit.   Endocrine: Negative for cold intolerance, heat intolerance, polydipsia, polyphagia and polyuria.   Genitourinary:  Negative for bladder incontinence, decreased urine volume, difficulty urinating, dysuria, enuresis, flank pain, frequency, hematuria, nocturia, pelvic pain and urgency.   Musculoskeletal:  Negative for arthralgias, back pain, gait problem, joint swelling, leg pain, myalgias, neck pain, neck stiffness and joint deformity.   Integumentary:  Negative for pallor, rash, wound, breast mass and breast tenderness.   Allergic/Immunologic: Negative for environmental allergies, food allergies and immunocompromised state.   Neurological:  Negative for dizziness, vertigo,  tremors, seizures, syncope, facial asymmetry, speech difficulty, weakness, light-headedness, numbness, headaches, coordination difficulties, memory loss and coordination difficulties.   Hematological:  Negative for adenopathy. Does not bruise/bleed easily.   Psychiatric/Behavioral:  Negative for agitation, behavioral problems, confusion, decreased concentration, dysphoric mood, hallucinations, self-injury, sleep disturbance and suicidal ideas. The patient is not nervous/anxious and is not hyperactive.    Breast: Negative for mass and tenderness      Objective:      Physical Exam  Vitals reviewed.   Constitutional:       Appearance: Normal appearance.   HENT:      Head: Normocephalic and atraumatic.      Right Ear: Tympanic membrane, ear canal and external ear normal.      Left Ear: Tympanic membrane, ear canal and external ear normal.      Nose: Congestion and rhinorrhea present.      Mouth/Throat:      Mouth: Mucous membranes are moist.      Pharynx: Oropharynx is clear. Posterior oropharyngeal erythema present.   Eyes:      Extraocular Movements: Extraocular movements intact.      Conjunctiva/sclera: Conjunctivae normal.      Pupils: Pupils are equal, round, and reactive to light.   Cardiovascular:      Rate and Rhythm: Normal rate and regular rhythm.      Pulses: Normal pulses.      Heart sounds: Normal heart sounds.   Pulmonary:      Effort: Pulmonary effort is normal.      Breath sounds: Normal breath sounds.   Abdominal:      General: Bowel sounds are normal.      Palpations: Abdomen is soft.   Musculoskeletal:         General: Normal range of motion.      Cervical back: Normal range of motion and neck supple.   Skin:     General: Skin is warm and dry.   Neurological:      General: No focal deficit present.      Mental Status: She is alert. Mental status is at baseline.   Psychiatric:         Mood and Affect: Mood normal.         Behavior: Behavior normal.         Thought Content: Thought content normal.          Judgment: Judgment normal.       Assessment:       1. Sinusitis, unspecified chronicity, unspecified location    2. Vertigo        Plan:     Sinusitis, unspecified chronicity, unspecified location  -     cefTRIAXone injection 500 mg  -     dexAMETHasone injection 4 mg    Vertigo    Other orders  -     amoxicillin-clavulanate 875-125mg (AUGMENTIN) 875-125 mg per tablet; Take 1 tablet by mouth 2 (two) times a day. for 7 days  Dispense: 14 tablet; Refill: 0  -     meclizine (ANTIVERT) 25 mg tablet; Take 1 tablet (25 mg total) by mouth 3 (three) times daily as needed for Dizziness.  Dispense: 20 tablet; Refill: 0  -     predniSONE (DELTASONE) 20 MG tablet; Take 1 tablet (20 mg total) by mouth once daily. for 5 days  Dispense: 5 tablet; Refill: 0

## 2023-05-30 ENCOUNTER — OFFICE VISIT (OUTPATIENT)
Dept: INTERNAL MEDICINE | Facility: CLINIC | Age: 53
End: 2023-05-30
Payer: COMMERCIAL

## 2023-05-30 VITALS
RESPIRATION RATE: 18 BRPM | BODY MASS INDEX: 28.41 KG/M2 | HEIGHT: 67 IN | OXYGEN SATURATION: 100 % | WEIGHT: 181 LBS | DIASTOLIC BLOOD PRESSURE: 84 MMHG | HEART RATE: 79 BPM | SYSTOLIC BLOOD PRESSURE: 118 MMHG | TEMPERATURE: 98 F

## 2023-05-30 DIAGNOSIS — G89.29 CHRONIC LOW BACK PAIN, UNSPECIFIED BACK PAIN LATERALITY, UNSPECIFIED WHETHER SCIATICA PRESENT: ICD-10-CM

## 2023-05-30 DIAGNOSIS — M54.2 NECK PAIN: ICD-10-CM

## 2023-05-30 DIAGNOSIS — F17.200 TOBACCO DEPENDENCE: ICD-10-CM

## 2023-05-30 DIAGNOSIS — K21.9 GASTROESOPHAGEAL REFLUX DISEASE, UNSPECIFIED WHETHER ESOPHAGITIS PRESENT: ICD-10-CM

## 2023-05-30 DIAGNOSIS — E03.9 HYPOTHYROIDISM, UNSPECIFIED TYPE: ICD-10-CM

## 2023-05-30 DIAGNOSIS — M54.50 CHRONIC LOW BACK PAIN, UNSPECIFIED BACK PAIN LATERALITY, UNSPECIFIED WHETHER SCIATICA PRESENT: ICD-10-CM

## 2023-05-30 DIAGNOSIS — I10 ESSENTIAL HYPERTENSION: ICD-10-CM

## 2023-05-30 DIAGNOSIS — K57.30 COLON, DIVERTICULOSIS: ICD-10-CM

## 2023-05-30 DIAGNOSIS — Z09 FOLLOW-UP EXAM: Primary | ICD-10-CM

## 2023-05-30 PROCEDURE — 99215 OFFICE O/P EST HI 40 MIN: CPT | Mod: PBBFAC | Performed by: INTERNAL MEDICINE

## 2023-05-30 PROCEDURE — 1159F MED LIST DOCD IN RCRD: CPT | Mod: ,,, | Performed by: INTERNAL MEDICINE

## 2023-05-30 PROCEDURE — 99214 PR OFFICE/OUTPT VISIT, EST, LEVL IV, 30-39 MIN: ICD-10-PCS | Mod: S$PBB,,, | Performed by: INTERNAL MEDICINE

## 2023-05-30 PROCEDURE — 1159F PR MEDICATION LIST DOCUMENTED IN MEDICAL RECORD: ICD-10-PCS | Mod: ,,, | Performed by: INTERNAL MEDICINE

## 2023-05-30 PROCEDURE — 99214 OFFICE O/P EST MOD 30 MIN: CPT | Mod: S$PBB,,, | Performed by: INTERNAL MEDICINE

## 2023-05-30 PROCEDURE — 3008F BODY MASS INDEX DOCD: CPT | Mod: ,,, | Performed by: INTERNAL MEDICINE

## 2023-05-30 PROCEDURE — 3074F PR MOST RECENT SYSTOLIC BLOOD PRESSURE < 130 MM HG: ICD-10-PCS | Mod: ,,, | Performed by: INTERNAL MEDICINE

## 2023-05-30 PROCEDURE — 3079F PR MOST RECENT DIASTOLIC BLOOD PRESSURE 80-89 MM HG: ICD-10-PCS | Mod: ,,, | Performed by: INTERNAL MEDICINE

## 2023-05-30 PROCEDURE — 3074F SYST BP LT 130 MM HG: CPT | Mod: ,,, | Performed by: INTERNAL MEDICINE

## 2023-05-30 PROCEDURE — 3079F DIAST BP 80-89 MM HG: CPT | Mod: ,,, | Performed by: INTERNAL MEDICINE

## 2023-05-30 PROCEDURE — 96372 THER/PROPH/DIAG INJ SC/IM: CPT | Mod: PBBFAC | Performed by: INTERNAL MEDICINE

## 2023-05-30 PROCEDURE — 3008F PR BODY MASS INDEX (BMI) DOCUMENTED: ICD-10-PCS | Mod: ,,, | Performed by: INTERNAL MEDICINE

## 2023-05-30 RX ORDER — KETOROLAC TROMETHAMINE 30 MG/ML
60 INJECTION, SOLUTION INTRAMUSCULAR; INTRAVENOUS
Status: COMPLETED | OUTPATIENT
Start: 2023-05-30 | End: 2023-05-30

## 2023-05-30 RX ORDER — IBUPROFEN 800 MG/1
800 TABLET ORAL 3 TIMES DAILY
Qty: 30 TABLET | Refills: 1 | Status: SHIPPED | OUTPATIENT
Start: 2023-05-30 | End: 2023-11-30 | Stop reason: SDUPTHER

## 2023-05-30 RX ORDER — LEVOTHYROXINE SODIUM 100 UG/1
100 TABLET ORAL
Qty: 90 TABLET | Refills: 3 | Status: SHIPPED | OUTPATIENT
Start: 2023-05-30 | End: 2023-11-30 | Stop reason: SDUPTHER

## 2023-05-30 RX ORDER — GABAPENTIN 100 MG/1
100 CAPSULE ORAL 3 TIMES DAILY
Qty: 90 CAPSULE | Refills: 2 | Status: SHIPPED | OUTPATIENT
Start: 2023-05-30 | End: 2023-11-30 | Stop reason: SDUPTHER

## 2023-05-30 RX ORDER — LOSARTAN POTASSIUM AND HYDROCHLOROTHIAZIDE 12.5; 5 MG/1; MG/1
1 TABLET ORAL DAILY
Qty: 90 TABLET | Refills: 3 | Status: SHIPPED | OUTPATIENT
Start: 2023-05-30

## 2023-05-30 RX ADMIN — KETOROLAC TROMETHAMINE 60 MG: 30 INJECTION, SOLUTION INTRAMUSCULAR; INTRAVENOUS at 09:05

## 2023-05-30 NOTE — PROGRESS NOTES
"Subjective:       Patient ID: Adele Solares is a 52 y.o. female.    Chief Complaint: Follow-up (Pt states she is still continuing to have back pain and neck pain with occasional headaches. )    The patient is a 51 yo female that presents today to establish care. She has history of hypothyroidism, hypertension, gerd, and osteoarthritis. She is up to date on colonoscopy and mammogram. She complains of some left neck pain and left shoulder pain. This has been ongoing for "awhile". Made worse with certain movements. She denies any known injury. Some radiation from neck to left arm. No numbness or tingling. She does continue to smoke about 1 pack per day. We have discussed cessation. She did quit one time with the help of chantix for 3 months but started back. Today she is resting comfortably in no distress. She does complain of some reflux at times. She is currently afebrile and vital signs are stable.    11/29/22-the patient presents today for follow-up.  She continues to have some issues with her neck.  The Toradol shot and prednisone did help some.  However it did not go all the way away.  X-ray of the cervical spine did not show any acute process.  There were some degenerative changes.  She does need refills on her blood pressure, thyroid, and GERD medications.  Otherwise she is doing okay.  She is afebrile and vital signs are stable    5/30/23-the patient presents today for follow-up.  She continues to complain of neck pain.  She also now complains of some numbness and tingling in her bilateral hands.  She gets some relief with ibuprofen but not a lot.  No weakness and no change in strength.  The Toradol injection does help for a little while.  Otherwise she is doing well.  Blood pressure looks good today.  It is 118/84.  She does need some refills on her medications.  She continues to smoke.  She is in contemplation stage when it comes to cessation.    Follow-up  Associated symptoms include headaches and neck " pain. Pertinent negatives include no abdominal pain, arthralgias, chest pain, chills, congestion, coughing, fatigue, fever, joint swelling, myalgias, nausea, rash, sore throat, vomiting or weakness.   Back Pain  Associated symptoms include headaches. Pertinent negatives include no abdominal pain, chest pain, dysuria, fever or weakness.   Neck Pain   Associated symptoms include headaches. Pertinent negatives include no chest pain, fever, trouble swallowing or weakness.   Thyroid Problem  Symptoms include constipation. Patient reports no anxiety, cold intolerance, diarrhea, fatigue, heat intolerance, menstrual problem, palpitations or tremors.   Review of Systems   Constitutional:  Negative for activity change, appetite change, chills, fatigue, fever and unexpected weight change.   HENT:  Negative for nasal congestion, ear pain, hearing loss, rhinorrhea, sinus pressure/congestion, sore throat and trouble swallowing.    Eyes:  Negative for pain, discharge, redness and visual disturbance.   Respiratory:  Negative for apnea, cough, chest tightness, shortness of breath and wheezing.    Cardiovascular:  Negative for chest pain and palpitations.   Gastrointestinal:  Positive for constipation and reflux. Negative for abdominal pain, blood in stool, diarrhea, nausea and vomiting.   Endocrine: Negative for cold intolerance, heat intolerance, polydipsia and polyuria.   Genitourinary:  Negative for difficulty urinating, dysuria, hematuria and menstrual problem.   Musculoskeletal:  Positive for back pain and neck pain. Negative for arthralgias, joint swelling and myalgias.   Integumentary:  Negative for pallor, rash and wound.   Allergic/Immunologic: Negative for immunocompromised state.   Neurological:  Positive for headaches. Negative for tremors, seizures, weakness and memory loss.   Hematological:  Negative for adenopathy.   Psychiatric/Behavioral:  Negative for confusion, dysphoric mood and sleep disturbance. The patient is  not nervous/anxious.        Objective:      Physical Exam  Vitals and nursing note reviewed.   Constitutional:       General: She is not in acute distress.     Appearance: Normal appearance. She is not ill-appearing.   HENT:      Head: Normocephalic and atraumatic.      Right Ear: External ear normal.      Left Ear: External ear normal.      Nose: Nose normal.      Mouth/Throat:      Pharynx: Oropharynx is clear.   Eyes:      Extraocular Movements: Extraocular movements intact.      Conjunctiva/sclera: Conjunctivae normal.      Pupils: Pupils are equal, round, and reactive to light.   Neck:      Vascular: No carotid bruit.   Cardiovascular:      Rate and Rhythm: Normal rate and regular rhythm.      Pulses: Normal pulses.      Heart sounds: Normal heart sounds. No murmur heard.  Pulmonary:      Effort: No respiratory distress.      Breath sounds: Normal breath sounds. No wheezing or rales.   Abdominal:      General: Bowel sounds are normal.      Palpations: Abdomen is soft.   Musculoskeletal:         General: Tenderness present. Normal range of motion.      Cervical back: Normal range of motion and neck supple.      Right lower leg: No edema.      Left lower leg: No edema.      Comments: Tenderness over muscles of left posterior neck    Open can test positive on left. She is also limited in movement by pain   Skin:     General: Skin is warm and dry.      Capillary Refill: Capillary refill takes less than 2 seconds.      Coloration: Skin is not pale.   Neurological:      General: No focal deficit present.      Mental Status: She is alert and oriented to person, place, and time.      Cranial Nerves: No cranial nerve deficit.      Sensory: No sensory deficit.      Motor: No weakness.      Gait: Gait normal.   Psychiatric:         Mood and Affect: Mood normal.         Judgment: Judgment normal.       Assessment:       Problem List Items Addressed This Visit          Cardiac/Vascular    Essential hypertension        Endocrine    Hypothyroidism       GI    Gastroesophageal reflux disease    Colon, diverticulosis       Orthopedic    Neck pain    Relevant Orders    MRI Cervical Spine Without Contrast    Ambulatory referral/consult to Pain Clinic    Chronic low back pain    Relevant Orders    Ambulatory referral/consult to Pain Clinic       Other    Tobacco dependence     Other Visit Diagnoses       Follow-up exam    -  Primary              Plan:       The patient presents today for follow-up She is up to date on mammogram and colonoscopy. Lab work is up-to-date.  We will repeat some at next visit.  We have also discussed diet and exercise.  She is limited in her ability to exercise secondary to her neck pain.    2.   Essential hypertension- blood pressure is 118/84   She is on losartan-hctz 50/12.5. Creatinine is 0.75. Blood sugar is 85.  We will refill her Hyzaar today    3.   Hypothyroidism- she is on levothyroxine 100 mcg daily.  We will refill her Synthroid today    4.   GERD- still having some issues. We are going to increase omeprazole to 40 mg daily. 30 minutes prior to first meal  Increasing to 40 mg has helped.  We will continue with the 40 mg dose  Stable with no acute issues    5.   Arthralgias- neck and left shoulder. We will image neck today. Toradol and steroid injection. Mobic prn. Also robaxin prn  11/29-she is still having some issues.  X-ray with no acute process.  We will continue with NSAIDs and Robaxin.  Refer to physical therapy  5/30/23-she is still having some issues.  Plain x-ray showed some arthritic changes.  She is now having some numbness and tingling as well.  We are going to set her up for an MRI of her cervical spine and refer her to pain management.  Toradol injection today.  I have also started gabapentin.    6.   Tobacco dependence- counseled on cessation. She is in contemplation stage. She quit before for 3 months using chantix      RTC 6 months

## 2023-05-30 NOTE — PATIENT INSTRUCTIONS
Gabapentin first week: 1 tab at bedtime  Second week: can add a second tab  Third week can add 3rd tab

## 2023-06-08 ENCOUNTER — HOSPITAL ENCOUNTER (OUTPATIENT)
Dept: RADIOLOGY | Facility: HOSPITAL | Age: 53
Discharge: HOME | End: 2023-06-08
Attending: INTERNAL MEDICINE
Payer: COMMERCIAL

## 2023-06-08 DIAGNOSIS — M54.2 NECK PAIN: ICD-10-CM

## 2023-06-08 PROCEDURE — 72141 MRI NECK SPINE W/O DYE: CPT | Mod: TC

## 2023-06-08 PROCEDURE — 72141 MRI CERVICAL SPINE WITHOUT CONTRAST: ICD-10-PCS | Mod: 26,,, | Performed by: RADIOLOGY

## 2023-06-08 PROCEDURE — 72141 MRI NECK SPINE W/O DYE: CPT | Mod: 26,,, | Performed by: RADIOLOGY

## 2023-07-10 ENCOUNTER — OFFICE VISIT (OUTPATIENT)
Dept: PAIN MEDICINE | Facility: CLINIC | Age: 53
End: 2023-07-10
Payer: COMMERCIAL

## 2023-07-10 ENCOUNTER — HOSPITAL ENCOUNTER (OUTPATIENT)
Dept: RADIOLOGY | Facility: HOSPITAL | Age: 53
Discharge: HOME | End: 2023-07-10
Attending: PAIN MEDICINE
Payer: COMMERCIAL

## 2023-07-10 VITALS
WEIGHT: 185 LBS | HEIGHT: 67 IN | RESPIRATION RATE: 18 BRPM | DIASTOLIC BLOOD PRESSURE: 74 MMHG | HEART RATE: 74 BPM | BODY MASS INDEX: 29.03 KG/M2 | SYSTOLIC BLOOD PRESSURE: 124 MMHG

## 2023-07-10 DIAGNOSIS — M54.50 CHRONIC LOW BACK PAIN, UNSPECIFIED BACK PAIN LATERALITY, UNSPECIFIED WHETHER SCIATICA PRESENT: ICD-10-CM

## 2023-07-10 DIAGNOSIS — M54.2 NECK PAIN: ICD-10-CM

## 2023-07-10 DIAGNOSIS — Z79.899 ENCOUNTER FOR LONG-TERM (CURRENT) USE OF OTHER MEDICATIONS: Primary | ICD-10-CM

## 2023-07-10 DIAGNOSIS — G89.29 CHRONIC LOW BACK PAIN, UNSPECIFIED BACK PAIN LATERALITY, UNSPECIFIED WHETHER SCIATICA PRESENT: ICD-10-CM

## 2023-07-10 LAB

## 2023-07-10 PROCEDURE — 3078F DIAST BP <80 MM HG: CPT | Mod: ,,, | Performed by: PAIN MEDICINE

## 2023-07-10 PROCEDURE — 72114 X-RAY EXAM L-S SPINE BENDING: CPT | Mod: 26,,, | Performed by: STUDENT IN AN ORGANIZED HEALTH CARE EDUCATION/TRAINING PROGRAM

## 2023-07-10 PROCEDURE — 1159F PR MEDICATION LIST DOCUMENTED IN MEDICAL RECORD: ICD-10-PCS | Mod: ,,, | Performed by: PAIN MEDICINE

## 2023-07-10 PROCEDURE — 3074F SYST BP LT 130 MM HG: CPT | Mod: ,,, | Performed by: PAIN MEDICINE

## 2023-07-10 PROCEDURE — 80305 DRUG TEST PRSMV DIR OPT OBS: CPT | Mod: PBBFAC | Performed by: PAIN MEDICINE

## 2023-07-10 PROCEDURE — 72114 XR LUMBAR SPINE 5 VIEW WITH FLEX AND EXT: ICD-10-PCS | Mod: 26,,, | Performed by: STUDENT IN AN ORGANIZED HEALTH CARE EDUCATION/TRAINING PROGRAM

## 2023-07-10 PROCEDURE — 72202 X-RAY EXAM SI JOINTS 3/> VWS: CPT | Mod: TC

## 2023-07-10 PROCEDURE — 3008F PR BODY MASS INDEX (BMI) DOCUMENTED: ICD-10-PCS | Mod: ,,, | Performed by: PAIN MEDICINE

## 2023-07-10 PROCEDURE — 72202 XR SACROILIAC JOINTS COMPLETE: ICD-10-PCS | Mod: 26,,, | Performed by: STUDENT IN AN ORGANIZED HEALTH CARE EDUCATION/TRAINING PROGRAM

## 2023-07-10 PROCEDURE — 3078F PR MOST RECENT DIASTOLIC BLOOD PRESSURE < 80 MM HG: ICD-10-PCS | Mod: ,,, | Performed by: PAIN MEDICINE

## 2023-07-10 PROCEDURE — 1159F MED LIST DOCD IN RCRD: CPT | Mod: ,,, | Performed by: PAIN MEDICINE

## 2023-07-10 PROCEDURE — 3074F PR MOST RECENT SYSTOLIC BLOOD PRESSURE < 130 MM HG: ICD-10-PCS | Mod: ,,, | Performed by: PAIN MEDICINE

## 2023-07-10 PROCEDURE — 99215 OFFICE O/P EST HI 40 MIN: CPT | Mod: PBBFAC | Performed by: PAIN MEDICINE

## 2023-07-10 PROCEDURE — 72202 X-RAY EXAM SI JOINTS 3/> VWS: CPT | Mod: 26,,, | Performed by: STUDENT IN AN ORGANIZED HEALTH CARE EDUCATION/TRAINING PROGRAM

## 2023-07-10 PROCEDURE — 3008F BODY MASS INDEX DOCD: CPT | Mod: ,,, | Performed by: PAIN MEDICINE

## 2023-07-10 PROCEDURE — 99204 OFFICE O/P NEW MOD 45 MIN: CPT | Mod: S$PBB,,, | Performed by: PAIN MEDICINE

## 2023-07-10 PROCEDURE — 99204 PR OFFICE/OUTPT VISIT, NEW, LEVL IV, 45-59 MIN: ICD-10-PCS | Mod: S$PBB,,, | Performed by: PAIN MEDICINE

## 2023-07-10 PROCEDURE — 72114 X-RAY EXAM L-S SPINE BENDING: CPT | Mod: TC

## 2023-07-10 RX ORDER — ACETAMINOPHEN AND CODEINE PHOSPHATE 300; 30 MG/1; MG/1
1 TABLET ORAL EVERY 4 HOURS PRN
Qty: 30 TABLET | Refills: 0 | Status: SHIPPED | OUTPATIENT
Start: 2023-07-10 | End: 2023-08-09

## 2023-07-10 NOTE — PROGRESS NOTES
"Chronic Pain - New Consult    Referring Physician: Yobani Lombardo,        SUBJECTIVE: Disclaimer: This note has been generated using voice-recognition software. There may be typographical errors that have been missed during proof-reading      Initial encounter:    Adele Solares presents to the clinic for the evaluation of lower back pain.       52-year-old female presents for new patient evaluation and consultation from Dr. Lombardo.  Patient reports a  history of lower back pain for many years.  She denies precipitating events.  Pain has worsened over time and remains primarily axial with occasional radicular symptoms to the lower extremities.  She has been treated conservatively with over-the-counter Nsaids and gabapentin for years ago.  She has not received an MRI or physical therapy for the lumbar pain.  She has not received a a rheumatology consultation,  surgical evaluation or  pain management.  She has difficulty going from sitting to standing.  She denies paresthesia or weakness of the upper extremities.  Physical therapy provided some improvement of her cervical pain.  Conservative treatments failed to provide relief of her lower back pain.    Pain Assessment  Pain Assessment: 0-10  Pain Score:   9  Pain Location: Back (lower back pain)  Pain Descriptors: Aching, Burning, Dull, Sharp  Pain Frequency: Constant/continuous  Pain Onset: Gradual  Aggravating Factors: Walking, Standing  Pain Intervention(s): Medication (See eMAR), Home medication, Heat applied, Cold applied      Physical Therapy/Home Exercise: no        Pain Medications:  has a current medication list which includes the following prescription(s): gabapentin, ibuprofen, levothyroxine, losartan-hydrochlorothiazide 50-12.5 mg, omeprazole, and acetaminophen-codeine 300-30mg.      Tried in Past:  NSAIDS-yes  TCA-no  SNRI-no  Anti-convulsants-yes  Muscle Relaxants-no  Opioids-no  Benzodiazepines-no     4A"s of Opioid Risk Assessment  Activity: " Patient has difficulty perform  ADL  Analgesia:  Patient's pain is partially controlled by current medication.   Aberrant Behavior:  reviewed with no aberrant drug seeking/taking behavior     report:  Reviewed and consistent with medication use as prescribed.    Patient denies suicidal or homicidal ideations    Pain interventional therapy-no    Chiropractor -no  Acupuncture - no  TENS unit -no  Spinal decompression -no  Joint replacement -no     Review of Systems   Constitutional: Negative.    HENT: Negative.     Eyes: Negative.    Respiratory: Negative.     Cardiovascular: Negative.    Gastrointestinal: Negative.    Endocrine: Negative.    Genitourinary: Negative.    Musculoskeletal:  Positive for arthralgias and back pain.   Integumentary:  Negative.   Neurological: Negative.    Hematological: Negative.    Psychiatric/Behavioral: Negative.             MRI Cervical Spine Without Contrast  Narrative: EXAMINATION:  MRI CERVICAL SPINE WITHOUT CONTRAST    CLINICAL HISTORY:  Neck pain, chronic, degenerative changes on xray; Cervicalgia    TECHNIQUE:  Axial and sagittal imaging of the spine is performed using T1, T2, STIR and T2 fat sat sequences without contrast.    COMPARISON:  None available    FINDINGS:  Vertebral bodies are normal in height.  There is reversal of normal curvature centered at C5.    C3-4: There is uncovertebral joint hypertrophy with moderate right foraminal stenosis and mild left foraminal stenosis.    C4-5: Disc osteophyte complex with mild central canal stenosis.  There is uncovertebral joint with mild to moderate right and severe left foraminal stenosis.    C5-6: There is disc osteophyte complex with mild central canal stenosis.  There is uncovertebral joint hypertrophy with moderate to severe bilateral foraminal stenosis.    C6-C7: There is disc osteophyte complex with mild central canal stenosis.  There is uncovertebral joint hypertrophy with mild right and mild to moderate left foraminal  "stenosis.    Spinal cord signal appears within normal limits.    Osseous marrow signal appears within normal limits.  Impression: Spondylitic changes as described above.    Electronically signed by: Wilian Mcdonald  Date:    06/08/2023  Time:    08:57         Past Medical History:   Diagnosis Date    Hypertension     Thyroid disease      Past Surgical History:   Procedure Laterality Date    HYSTERECTOMY      TUBAL LIGATION       Social History     Socioeconomic History    Marital status: Single   Tobacco Use    Smoking status: Every Day     Packs/day: 0.50     Years: 30.00     Pack years: 15.00     Types: Cigarettes    Smokeless tobacco: Never    Tobacco comments:     Been smoking for over 30 years   Substance and Sexual Activity    Alcohol use: Yes     Alcohol/week: 1.0 standard drink     Types: 1 Glasses of wine per week     Comment: A glass of wine maybe twice a month    Drug use: Never    Sexual activity: Yes     Partners: Male     Birth control/protection: None     Family History   Problem Relation Age of Onset    Cancer Mother     Arthritis Maternal Aunt     Cancer Sister      Review of patient's allergies indicates:  No Known Allergies      OBJECTIVE:  Vitals:    07/10/23 1309   BP: 124/74   Pulse: 74   Resp: 18     /74   Pulse 74   Resp 18   Ht 5' 7" (1.702 m)   Wt 83.9 kg (185 lb)   BMI 28.98 kg/m²   Physical Exam  Vitals and nursing note reviewed.   Constitutional:       General: She is not in acute distress.     Appearance: Normal appearance. She is not ill-appearing, toxic-appearing or diaphoretic.   HENT:      Head: Normocephalic and atraumatic.      Nose: Nose normal.      Mouth/Throat:      Mouth: Mucous membranes are moist.   Eyes:      Extraocular Movements: Extraocular movements intact.      Pupils: Pupils are equal, round, and reactive to light.   Cardiovascular:      Rate and Rhythm: Normal rate and regular rhythm.      Heart sounds: Normal heart sounds.   Pulmonary:      Effort: " Pulmonary effort is normal. No respiratory distress.      Breath sounds: Normal breath sounds. No stridor. No wheezing or rhonchi.   Abdominal:      General: Bowel sounds are normal.      Palpations: Abdomen is soft.   Musculoskeletal:         General: No swelling or deformity.      Cervical back: Normal and normal range of motion. No spasms or tenderness. No pain with movement. Normal range of motion.      Thoracic back: Normal.      Lumbar back: Tenderness and bony tenderness present. No spasms. Decreased range of motion. Negative right straight leg raise test and negative left straight leg raise test. No scoliosis.      Right lower leg: No edema.      Left lower leg: No edema.      Comments: Lumbar pain with flexion and extension.  Bilateral facet tenderness to palpation.  Bilateral SI joint tenderness to palpation   Skin:     General: Skin is warm.   Neurological:      General: No focal deficit present.      Mental Status: She is alert and oriented to person, place, and time. Mental status is at baseline.      Cranial Nerves: No cranial nerve deficit.      Sensory: Sensation is intact. No sensory deficit.      Motor: No weakness.      Coordination: Coordination normal.      Gait: Gait normal.      Deep Tendon Reflexes: Reflexes are normal and symmetric.   Psychiatric:         Mood and Affect: Mood normal.         Behavior: Behavior normal.          ASSESSMENT: 52 y.o. year old female with pain, consistent with     Encounter Diagnoses   Name Primary?    Chronic low back pain, unspecified back pain laterality, unspecified whether sciatica present     Neck pain     Encounter for long-term (current) use of other medications Yes        PLAN:   1. reviewed  2..Addiction, Dependency, Tolerance, Opioid abuse-misuse, Death, Diversion Discussed. Overdose reversal drug Naloxone discussed  3. Opioid contract signed today  4.Refill/ Continue medications for pain control and function.  Start Tylenol No. 3 for chronic pain  and discomfort    Requested Prescriptions     Signed Prescriptions Disp Refills    acetaminophen-codeine 300-30mg (TYLENOL #3) 300-30 mg Tab 30 tablet 0     Sig: Take 1 tablet by mouth every 4 (four) hours as needed (pain).     5. Obtain x-ray of the lumbar spine and SI joints  6.Urine drug screen and confirmation testing was ordered as documented on the requisition form in order to verify medication compliance, test for illicit substances.  7. Start physical therapy 2 to 3 times a week x6 weeks for intractable lower back pain  8. Obtain vitamin-D and B12 level for diffuse pain and myalgia    Orders Placed This Encounter   Procedures    X-Ray Lumbar Complete Including Flex And Ext     Standing Status:   Future     Standing Expiration Date:   7/10/2024     Order Specific Question:   May the Radiologist modify the order per protocol to meet the clinical needs of the patient?     Answer:   Yes    X-Ray Sacroiliac Joints Complete     Standing Status:   Future     Standing Expiration Date:   7/10/2024     Order Specific Question:   May the Radiologist modify the order per protocol to meet the clinical needs of the patient?     Answer:   Yes     Order Specific Question:   Release to patient     Answer:   Immediate    Drug Screen Definitive 14, Urine     Standing Status:   Future     Number of Occurrences:   1     Standing Expiration Date:   9/7/2024     Order Specific Question:   Specimen Source     Answer:   Urine    Vitamin D     Standing Status:   Future     Standing Expiration Date:   9/7/2024    Vitamin B12     Standing Status:   Future     Standing Expiration Date:   9/7/2024    Ambulatory referral/consult to Physical/Occupational Therapy     Standing Status:   Future     Standing Expiration Date:   8/10/2024     Referral Priority:   Routine     Referral Type:   Physical Medicine     Referral Reason:   Specialty Services Required     Number of Visits Requested:   1    POCT Urine Drug Screen ([facility])      Interpretive Information:     Negative:  No drug detected at the cut off level.   Positive:  This result represents presumptive positive for the   tested drug, other substances may yield a positive response other   than the analyte of interest. This result should be utilized for   diagnostic purpose only. Confirmation testing will be performed upon physician request only.         9. Return in 3-4 weeks for re-evaluation medication management  10. Consider MRI of the lumbar spine after completion of physical therapy    The total time spent for evaluation and management on 07/10/2023 including reviewing separately obtained history, performing a medically appropriate exam and evaluation, documenting clinical information in the health record, independently interpreting results and communicating them to the patient/family/caregiver, and ordering medications/tests/procedures was between 15-29 minutes.    The above plan and management options were discussed at length with patient. Patient is in agreement with the above and verbalized understanding. It will be communicated with the referring physician via electronic record, fax, or mail.    Paradise Schaffer  07/10/2023

## 2023-07-24 ENCOUNTER — CLINICAL SUPPORT (OUTPATIENT)
Dept: REHABILITATION | Facility: HOSPITAL | Age: 53
End: 2023-07-24
Attending: PAIN MEDICINE
Payer: COMMERCIAL

## 2023-07-24 DIAGNOSIS — G89.29 CHRONIC LOW BACK PAIN, UNSPECIFIED BACK PAIN LATERALITY, UNSPECIFIED WHETHER SCIATICA PRESENT: ICD-10-CM

## 2023-07-24 DIAGNOSIS — M54.50 CHRONIC LOW BACK PAIN, UNSPECIFIED BACK PAIN LATERALITY, UNSPECIFIED WHETHER SCIATICA PRESENT: ICD-10-CM

## 2023-07-24 DIAGNOSIS — R26.81 DIFFICULTY STANDING: ICD-10-CM

## 2023-07-24 DIAGNOSIS — R26.2 DIFFICULTY WALKING: ICD-10-CM

## 2023-07-24 PROBLEM — M54.2 NECK PAIN: Status: RESOLVED | Noted: 2022-09-22 | Resolved: 2023-07-24

## 2023-07-24 PROBLEM — M62.838 MUSCLE SPASM: Status: RESOLVED | Noted: 2022-09-22 | Resolved: 2023-07-24

## 2023-07-24 PROCEDURE — 97161 PT EVAL LOW COMPLEX 20 MIN: CPT

## 2023-07-24 PROCEDURE — 97110 THERAPEUTIC EXERCISES: CPT

## 2023-07-24 NOTE — PLAN OF CARE
"[facility]   Physical Therapy Initial Evaluation      Name: Adele Solares  Clinic Number: [id number]    Therapy Diagnosis:   Encounter Diagnosis   Name Primary?    Chronic low back pain, unspecified back pain laterality, unspecified whether sciatica present         Physician: Paradise Schaffer MD    Physician Orders: PT Eval and Treat   Medical Diagnosis from Referral: see above  Evaluation Date: 7/24/2023  Authorization Period Expiration: 12/31/2023  Plan of Care Expiration: 9/1/2023    Visit # / Visits authorized: 1/ 16 - HARD MAX - (used 4 visits in Jan)   FOTO: 1/ Intake Score = 42    Precautions: Standard     Time In: 7:47 am  Time Out: 8:23 am  Total Billable Time: 36 minutes    Subjective     Date of onset: "forever"    History of current condition - Adele reports: she has been having back pain for years, she has just been dealing with the pain - prolonged standing, walking and sweeping/mopping increase the pain - no significant sleep disturbance - flexion eases the pain, extension increases the pain    Falls: none    Imaging: xrays    Prior Therapy: Dec 2022/Jan 2023 for her neck  Social History:  lives with their family  Occupation: works at [employer] (full time) and [employer] (part time)  Prior Level of Function: independent   Current Level of Function: independent just takes breaks as needed    Pain:  Current 7/10, worst 9/10, best 5/10   Location: bilateral back    Description: Aching, Dull, Throbbing, and Deep  Aggravating Factors: Standing, Walking, and sweeping/mopping  Easing Factors: pain medication, lying down, and hot bath    Patients goals: be able to work and do normal activities with less pain     Medical History:   Past Medical History:   Diagnosis Date    Hypertension     Thyroid disease        Surgical History:   Adele Solares  has a past surgical history that includes Hysterectomy and Tubal ligation.    Medications:   Adele has a current " medication list which includes the following prescription(s): acetaminophen-codeine 300-30mg, gabapentin, ibuprofen, levothyroxine, losartan-hydrochlorothiazide 50-12.5 mg, and omeprazole.    Allergies:   Review of patient's allergies indicates:  No Known Allergies     Objective      Trunk range of motion:  Within functional limits except extension limited and painful    Lower extremity strength within functional limits    Hamstring length -25 degrees bilaterally    Negative slump test bilateral   Negative straight leg raise bilateral   Positve BRISEYDA test bilateral     Intake Outcome Measure for FOTO Lumbar Survey    Therapist reviewed FOTO scores for Adele Solares on 7/24/2023.   FOTO documents entered into [employer] - see Media section.    Intake Score: 42         Treatment     Total Treatment time (time-based codes) separate from Evaluation: 10 minutes     Adele received the treatments listed below:    Posterior pelvic tilts, hooklying hip abduction w/ blue band, lower trunk rotation, and hamstring stretching      Patient Education and Home Exercises     Education provided:   - evaluation results, plan of care and home exercise program     Written Home Exercises Provided: yes. Exercises were reviewed and Adele was able to demonstrate them prior to the end of the session.  Adele demonstrated good  understanding of the education provided. See EMR under Patient Instructions for exercises provided during therapy sessions.    Assessment     Adele is a 52 y.o. female referred to outpatient Physical Therapy with a medical diagnosis of low back pain. Patient presents with long history of low back pain. She states the pain increases with prolonged standing, walking and sweeping/mopping. She also states it will start hurting sometimes when she is just sitting. She has normal trunk range of motion except for extension. This is limited and painful. Flexion seems to improve her pain. Will work on improving core  strength, lumbar stability, hamstring flexibility and trunk extension range of motion.    Patient prognosis is Good.   Patient will benefit from skilled outpatient Physical Therapy to address the deficits stated above and in the chart below, provide patient /family education, and to maximize patientt's level of independence.     Plan of care discussed with patient: Yes  Patient's spiritual, cultural and educational needs considered and patient is agreeable to the plan of care and goals as stated below:     Anticipated Barriers for therapy: none    Medical Necessity is demonstrated by the following  History  Co-morbidities and personal factors that may impact the plan of care [x] LOW: no personal factors / co-morbidities  [] MODERATE: 1-2 personal factors / co-morbidities  [] HIGH: 3+ personal factors / co-morbidities    Moderate / High Support Documentation:   Co-morbidities affecting plan of care: none    Personal Factors:   no deficits     Examination  Body Structures and Functions, activity limitations and participation restrictions that may impact the plan of care [x] LOW: addressing 1-2 elements  [] MODERATE: 3+ elements  [] HIGH: 4+ elements (please support below)    Moderate / High Support Documentation: n/a     Clinical Presentation [x] LOW: stable  [] MODERATE: Evolving  [] HIGH: Unstable     Decision Making/ Complexity Score: low       Goals:  Patient will be independent with home exercise program to facilitate carryover between visits.  Patient will be able to stand and walk for >/= to 1 hour with < 2/10 pain for work related tasks.  Patient will be able to sweep and mop at work with < 2/10 pain.   Patient will improve hamstring length >/= 10 degrees bilaterally for improved mobility.  Plan     Plan of care Certification: 7/24/2023 to 9/1/2023.    Outpatient Physical Therapy 2 times weekly for 6 weeks to include the following interventions: Cervical/Lumbar Traction, Electrical Stimulation IFC/premod as  needed, Manual Therapy, Moist Heat/ Ice, Neuromuscular Re-ed, Patient Education, Therapeutic Activities, and Therapeutic Exercise.     RICARDO HERRON, PT        Physician's Signature: _________________________________________ Date: ________________

## 2023-07-24 NOTE — PATIENT INSTRUCTIONS
Posterior pelvic tilts  Hooklying hip abduction w/ blue theraband  Lower trunk rotation both sides  Bilateral hamstring stretching

## 2023-07-26 ENCOUNTER — CLINICAL SUPPORT (OUTPATIENT)
Dept: REHABILITATION | Facility: HOSPITAL | Age: 53
End: 2023-07-26
Attending: PAIN MEDICINE
Payer: COMMERCIAL

## 2023-07-26 DIAGNOSIS — R26.2 DIFFICULTY WALKING: Primary | ICD-10-CM

## 2023-07-26 DIAGNOSIS — M54.50 CHRONIC LOW BACK PAIN, UNSPECIFIED BACK PAIN LATERALITY, UNSPECIFIED WHETHER SCIATICA PRESENT: ICD-10-CM

## 2023-07-26 DIAGNOSIS — G89.29 CHRONIC LOW BACK PAIN, UNSPECIFIED BACK PAIN LATERALITY, UNSPECIFIED WHETHER SCIATICA PRESENT: ICD-10-CM

## 2023-07-26 DIAGNOSIS — R26.81 DIFFICULTY STANDING: ICD-10-CM

## 2023-07-26 PROCEDURE — 97110 THERAPEUTIC EXERCISES: CPT | Mod: CQ

## 2023-07-26 PROCEDURE — 97112 NEUROMUSCULAR REEDUCATION: CPT | Mod: CQ

## 2023-07-26 NOTE — PROGRESS NOTES
[facility]   Physical Therapy Treatment Note      Name: Adele Solares  Clinic Number: [id number]  Physician: Paradise Schaffer MD    Visit Date: 7/26/2023    Encounter Diagnosis   Name Primary?    Chronic low back pain, unspecified back pain laterality, unspecified whether sciatica present          Physician: Paradise Schaffer MD     Physician Orders: PT Eval and Treat   Medical Diagnosis from Referral: see above  Evaluation Date: 7/24/2023  Authorization Period Expiration: 12/31/2023  Plan of Care Expiration: 9/1/2023     Visit # / Visits authorized: 2 / 16 - HARD MAX - (used 4 visits in Jan)   FOTO: 1/ Intake Score = 42     Precautions: Standard      Time In: 8:44 am  Time Out: 9:23 am  Total: 39 minutes, (9 mins non billed)    PTA Visit #: 1/5       Subjective     Pt reports: 5/10 pain in low back.  She was compliant with home exercise program.  Response to previous treatment: First since eval  Functional change: none    Pain: 5/10  Location: bilateral back      Prior Level of Function: independent   Current Level of Function: independent just takes breaks as needed    Objective      Objective Measures updated at progress report unless specified.     Treatment     Adele received the treatments listed below:      therapeutic exercises to develop strength, endurance, ROM, flexibility, and posture for 15 minutes including:  NuStep x 5 minutes  Ball Rolling Flexion 10x10 seconds  Hamstring Stretch @ Steps (B) 3x20 seconds    manual therapy techniques: Joint mobilizations, Manual traction, and Myofacial release were applied to the: hip/back for 0 minutes, including:  Long Axis Traction  Manual Piriformis    neuromuscular re-education activities to improve: Kinesthetic, Sense, Proprioception, and Posture for 15 minutes. The following activities were included:  Retraction with Extension - green band 30x  Swiss Ball Abd Isometrics - 15x5 sec hold  Rows high - 3 plates 20x  Rows low - 3  plates 20x    therapeutic activities to improve functional performance for 0  minutes, including:      Patient Education and Home Exercises       Education provided:   - None    Written Home Exercises Provided: Patient instructed to cont prior HEP. Exercises were reviewed and Adele was able to demonstrate them prior to the end of the session.  Adele demonstrated good  understanding of the education provided. See EMR under Patient Instructions for exercises provided during therapy sessions    Assessment     Pt is doing well just has some pain in low back with strengthening exercises. Relief of pain with rolling into flexion and hamstring stretching. Tightness in bilateral QL with long axis traction. Will continue to progress as able. Time billed reflects time spent one on one with pt.           Adele is a 52 y.o. female referred to outpatient Physical Therapy with a medical diagnosis of low back pain. Patient presents with long history of low back pain. She states the pain increases with prolonged standing, walking and sweeping/mopping. She also states it will start hurting sometimes when she is just sitting. She has normal trunk range of motion except for extension. This is limited and painful. Flexion seems to improve her pain. Will work on improving core strength, lumbar stability, hamstring flexibility and trunk extension range of motion.     Adele Is progressing well towards her goals.   Pt prognosis is Good.     Pt will continue to benefit from skilled outpatient physical therapy to address the deficits listed in the problem list box on initial evaluation, provide pt/family education and to maximize pt's level of independence in the home and community environment.     Pt's spiritual, cultural and educational needs considered and pt agreeable to plan of care and goals.     Anticipated barriers to physical therapy: None    Goals: Patient will be independent with home exercise program to facilitate  carryover between visits.  Patient will be able to stand and walk for >/= to 1 hour with < 2/10 pain for work related tasks.  Patient will be able to sweep and mop at work with < 2/10 pain.   Patient will improve hamstring length >/= 10 degrees bilaterally for improved mobility.    Plan     Plan of care Certification: 7/24/2023 to 9/1/2023.     Outpatient Physical Therapy 2 times weekly for 6 weeks to include the following interventions: Cervical/Lumbar Traction, Electrical Stimulation IFC/premod as needed, Manual Therapy, Moist Heat/ Ice, Neuromuscular Re-ed, Patient Education, Therapeutic Activities, and Therapeutic Exercise.     Primitivo Brantley, PTA

## 2023-08-01 ENCOUNTER — CLINICAL SUPPORT (OUTPATIENT)
Dept: REHABILITATION | Facility: HOSPITAL | Age: 53
End: 2023-08-01
Payer: COMMERCIAL

## 2023-08-01 DIAGNOSIS — R26.81 DIFFICULTY STANDING: ICD-10-CM

## 2023-08-01 DIAGNOSIS — G89.29 CHRONIC LOW BACK PAIN, UNSPECIFIED BACK PAIN LATERALITY, UNSPECIFIED WHETHER SCIATICA PRESENT: ICD-10-CM

## 2023-08-01 DIAGNOSIS — M54.50 CHRONIC LOW BACK PAIN, UNSPECIFIED BACK PAIN LATERALITY, UNSPECIFIED WHETHER SCIATICA PRESENT: ICD-10-CM

## 2023-08-01 DIAGNOSIS — R26.2 DIFFICULTY WALKING: Primary | ICD-10-CM

## 2023-08-01 PROCEDURE — 97112 NEUROMUSCULAR REEDUCATION: CPT | Mod: CQ

## 2023-08-01 PROCEDURE — 97110 THERAPEUTIC EXERCISES: CPT | Mod: CQ

## 2023-08-01 PROCEDURE — 97140 MANUAL THERAPY 1/> REGIONS: CPT | Mod: CQ

## 2023-08-01 NOTE — PROGRESS NOTES
[facility]   Physical Therapy Treatment Note      Name: Adele Solares  Clinic Number: [id number]  Physician: Paradise Schaffer MD    Visit Date: 8/1/2023    Encounter Diagnosis   Name Primary?    Chronic low back pain, unspecified back pain laterality, unspecified whether sciatica present          Physician: Paradise Schaffer MD     Physician Orders: PT Eval and Treat   Medical Diagnosis from Referral: see above  Evaluation Date: 7/24/2023  Authorization Period Expiration: 12/31/2023  Plan of Care Expiration: 9/1/2023     Visit # / Visits authorized: 3 / 16 - HARD MAX - (used 4 visits in Jan)   FOTO: 1/ Intake Score = 42     Precautions: Standard      Time In: 7:44 am  Time Out: 8:23 am  Total: 39 mins    PTA Visit #: 2/5       Subjective     Pt reports: no change  She was compliant with home exercise program.  Response to previous treatment: First since eval  Functional change: none    Pain: 5/10  Location: bilateral back      Prior Level of Function: independent   Current Level of Function: independent just takes breaks as needed    Objective      Objective Measures updated at progress report unless specified.     Treatment     Adele received the treatments listed below:      therapeutic exercises to develop strength, endurance, ROM, flexibility, and posture for 23 minutes including:  NuStep x 5 minutes  Ball Rolling Flexion 10x10 seconds  Hamstring Stretch @ Steps (B) 3x20 seconds  Cybex back ext - 3 plates 30x  Hip Abd - green band (B) 20x  Hip Ext - green band (B) 20x  Bridges with Band - green 20x3 sec hold    manual therapy techniques: Joint mobilizations, Manual traction, and Myofacial release were applied to the: hip/back for 8 minutes, including:  Long Axis Traction  Manual Piriformis    neuromuscular re-education activities to improve: Kinesthetic, Sense, Proprioception, and Posture for 8 minutes. The following activities were included:  Retraction with Extension - green  band 30x  Swiss Ball Abd Isometrics - 15x5 sec hold  Rows high - 3 plates 20x  Rows low - 3 plates 20x    therapeutic activities to improve functional performance for 0  minutes, including:      Patient Education and Home Exercises       Education provided:   - None    Written Home Exercises Provided: Patient instructed to cont prior HEP. Exercises were reviewed and Adele was able to demonstrate them prior to the end of the session.  Adele demonstrated good  understanding of the education provided. See EMR under Patient Instructions for exercises provided during therapy sessions    Assessment       Pt is doing well just continues to have pain with most exercises. No QL tightness today during long axis traction. Progressed core/LOWER EXTREMITY strengthening with fatigue noted. Pt is doing well just pain limits what she can/cannot do. Core strength is improving. Will progress as able.         Adele is a 52 y.o. female referred to outpatient Physical Therapy with a medical diagnosis of low back pain. Patient presents with long history of low back pain. She states the pain increases with prolonged standing, walking and sweeping/mopping. She also states it will start hurting sometimes when she is just sitting. She has normal trunk range of motion except for extension. This is limited and painful. Flexion seems to improve her pain. Will work on improving core strength, lumbar stability, hamstring flexibility and trunk extension range of motion.     Adele Is progressing well towards her goals.   Pt prognosis is Good.     Pt will continue to benefit from skilled outpatient physical therapy to address the deficits listed in the problem list box on initial evaluation, provide pt/family education and to maximize pt's level of independence in the home and community environment.     Pt's spiritual, cultural and educational needs considered and pt agreeable to plan of care and goals.     Anticipated barriers to physical  therapy: None    Goals: Patient will be independent with home exercise program to facilitate carryover between visits.  Patient will be able to stand and walk for >/= to 1 hour with < 2/10 pain for work related tasks.  Patient will be able to sweep and mop at work with < 2/10 pain.   Patient will improve hamstring length >/= 10 degrees bilaterally for improved mobility.    Plan     Plan of care Certification: 7/24/2023 to 9/1/2023.     Outpatient Physical Therapy 2 times weekly for 6 weeks to include the following interventions: Cervical/Lumbar Traction, Electrical Stimulation IFC/premod as needed, Manual Therapy, Moist Heat/ Ice, Neuromuscular Re-ed, Patient Education, Therapeutic Activities, and Therapeutic Exercise.     Primitivo Brantley, PTA

## 2023-08-02 LAB — BEAKER SEE SCANNED REPORT: NORMAL

## 2023-08-04 NOTE — PROGRESS NOTES
She Disclaimer: This note has been generated using voice-recognition software. There may be typographical errors that have been missed during proof-reading        Patient ID: Adele Solares is a 52 y.o. female.      Chief Complaint: Back Pain      52-year-old female returns for re-evaluation of chronic lower back pain after completing 2 weeks of physical therapy.  She denies any significant improvement and has 4 weeks remaining.  Tylenol No. 3 has provided minimal relief.  X-rays revealed mild degenerative changes lumbar spine.  The pain started many years ago and has worsened over time.  She notes occasional radicular symptoms to the lower extremities but she denies weakness, bowel or bladder involvement..  NSAIDs and gabapentin have provided minimal relief.  She has not received an MRI of the lumbar spine.  She has not received a rheumatology consultation or surgical evaluation.            Pain Assessment  Pain Assessment: 0-10  Pain Score:   6  Pain Location: Back  Pain Descriptors: Sharp  Pain Frequency: Intermittent  Pain Intervention(s): Medication (See eMAR) (The medication she has been taking no longer works)      A's of Opioid Risk Assessment  Activity:Patient has difficulty performing ADL.   Analgesia:Patients pain is not controlled by current medication.   Adverse Effects: Patient denies constipation or sedation.  Aberrant Behavior:  reviewed with no aberrant drug seeking/taking behavior.      Patient denies any suicidal or homicidal ideations    Physical Therapy/Home Exercise: yes      X-Ray Sacroiliac Joints Complete  Narrative: EXAMINATION:  XR LUMBAR SPINE 5 VIEW WITH FLEX AND EXT; XR SACROILIAC JOINTS COMPLETE    CLINICAL HISTORY:  Lower back pain    TECHNIQUE:  XR LUMBAR SPINE 5 VIEW WITH FLEX AND EXT; XR SACROILIAC JOINTS COMPLETE    COMPARISON:  2019    FINDINGS:  No acute fracture or dislocation.  No dynamic instability.  Mild multilevel degenerative change of the lumbar spine.    The  sacroiliac joints are normal.  Impression: As above    Electronically signed by: Jaskaran Mcdonald  Date:    07/10/2023  Time:    14:23  X-Ray Lumbar Complete Including Flex And Ext  Narrative: EXAMINATION:  XR LUMBAR SPINE 5 VIEW WITH FLEX AND EXT; XR SACROILIAC JOINTS COMPLETE    CLINICAL HISTORY:  Lower back pain    TECHNIQUE:  XR LUMBAR SPINE 5 VIEW WITH FLEX AND EXT; XR SACROILIAC JOINTS COMPLETE    COMPARISON:  2019    FINDINGS:  No acute fracture or dislocation.  No dynamic instability.  Mild multilevel degenerative change of the lumbar spine.    The sacroiliac joints are normal.  Impression: As above    Electronically signed by: Jaskaran Mcdonald  Date:    07/10/2023  Time:    14:23      Review of Systems   Constitutional: Negative.    HENT: Negative.     Eyes: Negative.    Respiratory: Negative.     Cardiovascular: Negative.    Gastrointestinal: Negative.    Endocrine: Negative.    Genitourinary: Negative.    Musculoskeletal:  Positive for arthralgias and back pain.   Integumentary:  Negative.   Neurological: Negative.    Hematological: Negative.    Psychiatric/Behavioral: Negative.               Past Medical History:   Diagnosis Date    Hypertension     Thyroid disease      Past Surgical History:   Procedure Laterality Date    HYSTERECTOMY      TUBAL LIGATION       Social History     Socioeconomic History    Marital status: Single   Tobacco Use    Smoking status: Every Day     Current packs/day: 0.50     Average packs/day: 0.5 packs/day for 30.0 years (15.0 ttl pk-yrs)     Types: Cigarettes    Smokeless tobacco: Never    Tobacco comments:     Been smoking for over 30 years   Substance and Sexual Activity    Alcohol use: Yes     Alcohol/week: 1.0 standard drink of alcohol     Types: 1 Glasses of wine per week     Comment: A glass of wine maybe twice a month    Drug use: Never    Sexual activity: Yes     Partners: Male     Birth control/protection: None     Family History   Problem Relation Age of Onset     Cancer Mother     Arthritis Maternal Aunt     Cancer Sister      Review of patient's allergies indicates:  No Known Allergies  has a current medication list which includes the following prescription(s): gabapentin, ibuprofen, levothyroxine, losartan-hydrochlorothiazide 50-12.5 mg, omeprazole, acetaminophen-codeine 300-30mg, celecoxib, and gabapentin.      Objective:  Vitals:    08/07/23 0846   BP: 114/70   Pulse: 89        Physical Exam  Vitals and nursing note reviewed.   Constitutional:       General: She is not in acute distress.     Appearance: Normal appearance. She is not ill-appearing, toxic-appearing or diaphoretic.   HENT:      Head: Normocephalic and atraumatic.      Nose: Nose normal.      Mouth/Throat:      Mouth: Mucous membranes are moist.   Eyes:      Extraocular Movements: Extraocular movements intact.      Pupils: Pupils are equal, round, and reactive to light.   Cardiovascular:      Rate and Rhythm: Normal rate and regular rhythm.      Heart sounds: Normal heart sounds.   Pulmonary:      Effort: Pulmonary effort is normal. No respiratory distress.      Breath sounds: Normal breath sounds. No stridor. No wheezing or rhonchi.   Abdominal:      General: Bowel sounds are normal.      Palpations: Abdomen is soft.   Musculoskeletal:         General: No swelling or deformity.      Cervical back: Normal and normal range of motion. No spasms or tenderness. No pain with movement. Normal range of motion.      Thoracic back: Normal.      Lumbar back: Tenderness and bony tenderness present. No spasms. Decreased range of motion. Negative right straight leg raise test and negative left straight leg raise test. No scoliosis.      Right lower leg: No edema.      Left lower leg: No edema.      Comments: Lumbar pain with flexion, extension and lateral rotation. Facet tenderness to palpation from L3-S1. Bilateral SI joint tenderness to palpation   Skin:     General: Skin is warm.   Neurological:      General: No focal  deficit present.      Mental Status: She is alert and oriented to person, place, and time. Mental status is at baseline.      Cranial Nerves: No cranial nerve deficit.      Sensory: Sensation is intact. No sensory deficit.      Motor: No weakness.      Coordination: Coordination normal.      Gait: Gait normal.      Deep Tendon Reflexes: Reflexes are normal and symmetric.   Psychiatric:         Mood and Affect: Mood normal.         Behavior: Behavior normal.           Assessment:      1. Chronic low back pain, unspecified back pain laterality, unspecified whether sciatica present    2. Neck pain          Plan:  1. reviewed  2.Addiction, Dependency, Tolerance, Opioid abuse-misuse, Death, Diversion Discussed. Overdose reversal drug Naloxone discussed.  3.Refill/Continue medications for pain control and function       Requested Prescriptions     Signed Prescriptions Disp Refills    acetaminophen-codeine 300-30mg (TYLENOL #3) 300-30 mg Tab 60 tablet 0     Sig: Take 1 tablet by mouth every 12 (twelve) hours as needed (pain).    gabapentin (NEURONTIN) 300 MG capsule 90 capsule 0     Sig: Take 1 capsule (300 mg total) by mouth 3 (three) times daily.    celecoxib (CELEBREX) 200 MG capsule 30 capsule 0     Sig: Take 1 capsule (200 mg total) by mouth once daily.     4. Continue physical therapy as prescribed for intractable lower back pain   5. Consider MRI of the lumbar spine after completion of physical therapy  6. Return in 1 month for re-evaluation     report:  Reviewed and consistent with medication use as prescribed.      The total time spent for evaluation and management on 08/11/2023 including reviewing separately obtained history, performing a medically appropriate exam and evaluation, documenting clinical information in the health record, independently interpreting results and communicating them to the patient/family/caregiver, and ordering medications/tests/procedures was between 15-29 minutes.    The above  plan and management options were discussed at length with patient. Patient is in agreement with the above and verbalized understanding. It will be communicated with the referring physician via electronic record, fax, or mail.

## 2023-08-07 ENCOUNTER — OFFICE VISIT (OUTPATIENT)
Dept: PAIN MEDICINE | Facility: CLINIC | Age: 53
End: 2023-08-07
Payer: COMMERCIAL

## 2023-08-07 VITALS
BODY MASS INDEX: 28.72 KG/M2 | DIASTOLIC BLOOD PRESSURE: 70 MMHG | SYSTOLIC BLOOD PRESSURE: 114 MMHG | WEIGHT: 183 LBS | HEART RATE: 89 BPM | HEIGHT: 67 IN

## 2023-08-07 DIAGNOSIS — M54.2 NECK PAIN: ICD-10-CM

## 2023-08-07 DIAGNOSIS — G89.29 CHRONIC LOW BACK PAIN, UNSPECIFIED BACK PAIN LATERALITY, UNSPECIFIED WHETHER SCIATICA PRESENT: Primary | ICD-10-CM

## 2023-08-07 DIAGNOSIS — M54.50 CHRONIC LOW BACK PAIN, UNSPECIFIED BACK PAIN LATERALITY, UNSPECIFIED WHETHER SCIATICA PRESENT: Primary | ICD-10-CM

## 2023-08-07 PROCEDURE — 99213 OFFICE O/P EST LOW 20 MIN: CPT | Mod: S$GLB,,, | Performed by: PAIN MEDICINE

## 2023-08-07 PROCEDURE — 3008F BODY MASS INDEX DOCD: CPT | Mod: S$GLB,,, | Performed by: PAIN MEDICINE

## 2023-08-07 PROCEDURE — 99213 OFFICE O/P EST LOW 20 MIN: CPT | Mod: PBBFAC | Performed by: PAIN MEDICINE

## 2023-08-07 PROCEDURE — 3078F DIAST BP <80 MM HG: CPT | Mod: S$GLB,,, | Performed by: PAIN MEDICINE

## 2023-08-07 PROCEDURE — 1159F MED LIST DOCD IN RCRD: CPT | Mod: S$GLB,,, | Performed by: PAIN MEDICINE

## 2023-08-07 PROCEDURE — 99213 PR OFFICE/OUTPT VISIT, EST, LEVL III, 20-29 MIN: ICD-10-PCS | Mod: S$GLB,,, | Performed by: PAIN MEDICINE

## 2023-08-07 PROCEDURE — 3074F SYST BP LT 130 MM HG: CPT | Mod: S$GLB,,, | Performed by: PAIN MEDICINE

## 2023-08-07 PROCEDURE — 1159F PR MEDICATION LIST DOCUMENTED IN MEDICAL RECORD: ICD-10-PCS | Mod: S$GLB,,, | Performed by: PAIN MEDICINE

## 2023-08-07 PROCEDURE — 3008F PR BODY MASS INDEX (BMI) DOCUMENTED: ICD-10-PCS | Mod: S$GLB,,, | Performed by: PAIN MEDICINE

## 2023-08-07 PROCEDURE — 3074F PR MOST RECENT SYSTOLIC BLOOD PRESSURE < 130 MM HG: ICD-10-PCS | Mod: S$GLB,,, | Performed by: PAIN MEDICINE

## 2023-08-07 PROCEDURE — 3078F PR MOST RECENT DIASTOLIC BLOOD PRESSURE < 80 MM HG: ICD-10-PCS | Mod: S$GLB,,, | Performed by: PAIN MEDICINE

## 2023-08-07 RX ORDER — GABAPENTIN 300 MG/1
300 CAPSULE ORAL 3 TIMES DAILY
Qty: 90 CAPSULE | Refills: 0 | Status: SHIPPED | OUTPATIENT
Start: 2023-08-07 | End: 2023-09-06 | Stop reason: SDUPTHER

## 2023-08-07 RX ORDER — ACETAMINOPHEN AND CODEINE PHOSPHATE 300; 30 MG/1; MG/1
1 TABLET ORAL EVERY 12 HOURS PRN
Qty: 60 TABLET | Refills: 0 | Status: SHIPPED | OUTPATIENT
Start: 2023-08-07 | End: 2023-09-06 | Stop reason: SDUPTHER

## 2023-08-07 RX ORDER — CELECOXIB 200 MG/1
200 CAPSULE ORAL DAILY
Qty: 30 CAPSULE | Refills: 0 | Status: SHIPPED | OUTPATIENT
Start: 2023-08-07 | End: 2023-09-06 | Stop reason: SDUPTHER

## 2023-08-07 NOTE — PROGRESS NOTES
Case conference with Vi Doss PT, for initial PTA visit. [facility]   Physical Therapy Treatment Note      Name: Adele Solares  Clinic Number: [id number]  Physician: Paradise Schaffer MD    Visit Date: 8/8/2023    Encounter Diagnosis   Name Primary?    Chronic low back pain, unspecified back pain laterality, unspecified whether sciatica present          Physician: Paradise Schaffer MD     Physician Orders: PT Eval and Treat   Medical Diagnosis from Referral: see above  Evaluation Date: 7/24/2023  Authorization Period Expiration: 12/31/2023  Plan of Care Expiration: 9/1/2023     Visit # / Visits authorized: 4 / 16 - HARD [facility] - (used 4 visits in Jan)   FOTO: 1/3 Intake Score = 42     Precautions: Standard      Time In: 7:45 am  Time Out: 8:37 am  Total: 45 mins    PTA Visit #: 3/5       Subjective     Pt reports: no change  She was compliant with home exercise program.  Response to previous treatment: First since eval  Functional change: none    Pain: 6/10  Location: bilateral back      Prior Level of Function: independent   Current Level of Function: independent just takes breaks as needed    Objective      Objective Measures updated at progress report unless specified.   Case conference with Vi Doss, PT, for initial PTA visit.     Treatment     Adele received the treatments listed below:      therapeutic exercises to develop strength, endurance, ROM, flexibility, and posture for 11 minutes including:  NuStep x 5 minutes  Hamstring Stretch @ Steps (B) 3 x 30 seconds  Cybex back ext - 3 plates   Hip Abd - green band (B)   Hip Ext - green band (B)   Bridges with Band - green 20 x 3 sec hold    manual therapy techniques: Joint mobilizations, Manual traction, and Myofacial release were applied to the: hip/back for 0 minutes, including:  Long Axis Traction  Manual Piriformis    neuromuscular re-education activities to improve: Kinesthetic, Sense, Proprioception, and Posture for 13  minutes. The following activities were included:  Pelvic tilts 10 x 10 second hold   Tilt with march with green theraband x 10 each leg  Retraction with Extension - green band   Rows high - 3 plates x 20  Rows low - 3 plates x 20    therapeutic activities to improve functional performance for 10 minutes, including:  Swiss ball: Supine rolls for flexion stretch 10 x 10 second hold          Seated flexion rollouts 10 x 10 second hold          Seated lateral flexion rollouts 10 x 5 second hold each way    IFC electrical stimulation to decrease pain in low back after treatment, seated in chair with moist heat applied to same area,  MHz x 10 minutes after being cleared for contraindications. Patient had no signs of adverse reaction following modality.    Patient Education and Home Exercises       Education provided:   - None    Written Home Exercises Provided: Patient instructed to cont prior HEP. Exercises were reviewed and Adele was able to demonstrate them prior to the end of the session.  Adele demonstrated good  understanding of the education provided. See EMR under Patient Instructions for exercises provided during therapy sessions    Assessment     Currently:  Adele was able to perform pelvic tilt exercises to increase core awareness and begin integrating core stability into her daily activities. She reported the supine ball roll flexion stretch felt good. She did not come to therapy last Thursday due to hurting so much after work.  Ended today's treatment with IFC due to increased complaint of pain. Will monitor and progress as able.      Initially:  Adele is a 52 y.o. female referred to outpatient Physical Therapy with a medical diagnosis of low back pain. Patient presents with long history of low back pain. She states the pain increases with prolonged standing, walking and sweeping/mopping. She also states it will start hurting sometimes when she is just sitting. She has normal trunk range of  motion except for extension. This is limited and painful. Flexion seems to improve her pain. Will work on improving core strength, lumbar stability, hamstring flexibility and trunk extension range of motion.     Adele Is progressing well towards her goals.   Pt prognosis is Good.     Pt will continue to benefit from skilled outpatient physical therapy to address the deficits listed in the problem list box on initial evaluation, provide pt/family education and to maximize pt's level of independence in the home and community environment.     Pt's spiritual, cultural and educational needs considered and pt agreeable to plan of care and goals.     Anticipated barriers to physical therapy: None    Goals: Patient will be independent with home exercise program to facilitate carryover between visits.  Patient will be able to stand and walk for >/= to 1 hour with < 2/10 pain for work related tasks.  Patient will be able to sweep and mop at work with < 2/10 pain.   Patient will improve hamstring length >/= 10 degrees bilaterally for improved mobility.    Plan     Plan of care Certification: 7/24/2023 to 9/1/2023.     Outpatient Physical Therapy 2 times weekly for 6 weeks to include the following interventions: Cervical/Lumbar Traction, Electrical Stimulation IFC/premod as needed, Manual Therapy, Moist Heat/ Ice, Neuromuscular Re-ed, Patient Education, Therapeutic Activities, and Therapeutic Exercise.     Garima Osuna, PTA   08/08/2023

## 2023-08-08 ENCOUNTER — CLINICAL SUPPORT (OUTPATIENT)
Dept: REHABILITATION | Facility: HOSPITAL | Age: 53
End: 2023-08-08
Payer: COMMERCIAL

## 2023-08-08 DIAGNOSIS — R26.2 DIFFICULTY WALKING: Primary | ICD-10-CM

## 2023-08-08 DIAGNOSIS — R26.81 DIFFICULTY STANDING: ICD-10-CM

## 2023-08-08 PROCEDURE — 97110 THERAPEUTIC EXERCISES: CPT | Mod: CQ

## 2023-08-08 PROCEDURE — 97530 THERAPEUTIC ACTIVITIES: CPT | Mod: CQ

## 2023-08-08 PROCEDURE — 97014 ELECTRIC STIMULATION THERAPY: CPT | Mod: CQ

## 2023-08-08 PROCEDURE — 97112 NEUROMUSCULAR REEDUCATION: CPT | Mod: CQ

## 2023-08-10 ENCOUNTER — CLINICAL SUPPORT (OUTPATIENT)
Dept: REHABILITATION | Facility: HOSPITAL | Age: 53
End: 2023-08-10
Payer: COMMERCIAL

## 2023-08-10 DIAGNOSIS — R26.81 DIFFICULTY STANDING: ICD-10-CM

## 2023-08-10 DIAGNOSIS — R26.2 DIFFICULTY WALKING: Primary | ICD-10-CM

## 2023-08-10 PROCEDURE — 97110 THERAPEUTIC EXERCISES: CPT | Mod: CQ

## 2023-08-10 PROCEDURE — 97112 NEUROMUSCULAR REEDUCATION: CPT | Mod: CQ

## 2023-08-10 PROCEDURE — 97014 ELECTRIC STIMULATION THERAPY: CPT | Mod: CQ

## 2023-08-10 PROCEDURE — 97530 THERAPEUTIC ACTIVITIES: CPT | Mod: CQ

## 2023-08-10 NOTE — PROGRESS NOTES
[facility]   Physical Therapy Treatment Note      Name: Adele Solares  Clinic Number: [id number]  Physician: Paradise Schaffer MD    Visit Date: 8/10/2023    Encounter Diagnosis   Name Primary?    Chronic low back pain, unspecified back pain laterality, unspecified whether sciatica present          Physician: Paradise Schaffer MD     Physician Orders: PT Eval and Treat   Medical Diagnosis from Referral: see above  Evaluation Date: 7/24/2023  Authorization Period Expiration: 12/31/2023  Plan of Care Expiration: 9/1/2023     Visit # / Visits authorized: 5 / 16 - HARD [facility] - (used 4 visits in Jan)   FOTO: 1/3 Intake Score = 42     Precautions: Standard      Time In: 7:44 am  Time Out: 8:35 am  Total: 48 mins    PTA Visit #: 4/5       Subjective     Pt reports: no change  She was compliant with home exercise program.  Response to previous treatment: she felt ok after last visit, pain was not worse  Functional change: none    Pain: 6/10  Location: bilateral back      Prior Level of Function: independent   Current Level of Function: independent just takes breaks as needed    Objective      Objective Measures updated at progress report unless specified.     Treatment     Adele received the treatments listed below:      therapeutic exercises to develop strength, endurance, ROM, flexibility, and posture for 12 minutes including:  NuStep x 5 minutes  Hamstring Stretch @ Steps (B) 3 x 30 seconds  Cybex back ext - 3 plates   Hip Abd - green band (B)   Hip Ext - green band (B)   Bridges with Band - green 20 x 3 sec hold    manual therapy techniques: Joint mobilizations, Manual traction, and Myofacial release were applied to the: hip/back for 0 minutes, including:  Long Axis Traction  Manual Piriformis    neuromuscular re-education activities to improve: Kinesthetic, Sense, Proprioception, and Posture for 14 minutes. The following activities were included:  Pelvic tilts 10 x 10 second hold   Tilt  with march with green theraband x 20  Retraction with Extension - blue band x 20  Rows high - 3 plates x 20  Rows low - 3 plates x 20    therapeutic activities to improve functional performance for 12 minutes, including:  Swiss ball: Supine rolls for flexion stretch 10 x 10 second hold          Seated flexion rollouts 10 x 10 second hold          Seated lateral flexion rollouts 10 x 5 second hold each way    IFC electrical stimulation to decrease pain in low back after treatment, seated in chair with moist heat applied to same area,  MHz x 10 minutes after being cleared for contraindications. Patient had no signs of adverse reaction following modality.    Patient Education and Home Exercises       Education provided:   - None    Written Home Exercises Provided: Patient instructed to cont prior HEP. Exercises were reviewed and Adele was able to demonstrate them prior to the end of the session.  Adele demonstrated good  understanding of the education provided. See EMR under Patient Instructions for exercises provided during therapy sessions    Assessment     Currently:  Adele had a little difficulty coordinating pelvic tilt with breathing but completed exercises without complaint. Bridges with hip abduction were challenging but she did report less pain after last treatment. Ended with IFC again secondary to less pain after last use. Will continue to monitor and progress as able.      Initially:  Adele is a 52 y.o. female referred to outpatient Physical Therapy with a medical diagnosis of low back pain. Patient presents with long history of low back pain. She states the pain increases with prolonged standing, walking and sweeping/mopping. She also states it will start hurting sometimes when she is just sitting. She has normal trunk range of motion except for extension. This is limited and painful. Flexion seems to improve her pain. Will work on improving core strength, lumbar stability, hamstring  flexibility and trunk extension range of motion.     Adele Is progressing well towards her goals.   Pt prognosis is Good.     Pt will continue to benefit from skilled outpatient physical therapy to address the deficits listed in the problem list box on initial evaluation, provide pt/family education and to maximize pt's level of independence in the home and community environment.     Pt's spiritual, cultural and educational needs considered and pt agreeable to plan of care and goals.     Anticipated barriers to physical therapy: None    Goals: Patient will be independent with home exercise program to facilitate carryover between visits.  Patient will be able to stand and walk for >/= to 1 hour with < 2/10 pain for work related tasks.  Patient will be able to sweep and mop at work with < 2/10 pain.   Patient will improve hamstring length >/= 10 degrees bilaterally for improved mobility.    Plan     Plan of care Certification: 7/24/2023 to 9/1/2023.     Outpatient Physical Therapy 2 times weekly for 6 weeks to include the following interventions: Cervical/Lumbar Traction, Electrical Stimulation IFC/premod as needed, Manual Therapy, Moist Heat/ Ice, Neuromuscular Re-ed, Patient Education, Therapeutic Activities, and Therapeutic Exercise.     Garima Osuna, PTA   08/10/2023

## 2023-08-15 ENCOUNTER — CLINICAL SUPPORT (OUTPATIENT)
Dept: REHABILITATION | Facility: HOSPITAL | Age: 53
End: 2023-08-15
Payer: COMMERCIAL

## 2023-08-15 DIAGNOSIS — R26.81 DIFFICULTY STANDING: ICD-10-CM

## 2023-08-15 DIAGNOSIS — R26.2 DIFFICULTY WALKING: Primary | ICD-10-CM

## 2023-08-15 PROCEDURE — 97110 THERAPEUTIC EXERCISES: CPT | Mod: CQ

## 2023-08-15 PROCEDURE — 97112 NEUROMUSCULAR REEDUCATION: CPT | Mod: CQ

## 2023-08-15 PROCEDURE — 97530 THERAPEUTIC ACTIVITIES: CPT | Mod: CQ

## 2023-08-15 PROCEDURE — 97014 ELECTRIC STIMULATION THERAPY: CPT | Mod: CQ

## 2023-08-15 NOTE — PROGRESS NOTES
[facility]   Physical Therapy Treatment Note      Name: Adele Solares  Clinic Number: [id number]  Physician: Paradise Schaffer MD    Visit Date: 8/15/2023    Encounter Diagnosis   Name Primary?    Chronic low back pain, unspecified back pain laterality, unspecified whether sciatica present          Physician: Paradise Schaffer MD     Physician Orders: PT Eval and Treat   Medical Diagnosis from Referral: see above  Evaluation Date: 7/24/2023  Authorization Period Expiration: 12/31/2023  Plan of Care Expiration: 9/1/2023     Visit # / Visits authorized: 5 / 16 - HARD MAX - (used 4 visits in Jan)   FOTO: 1/3 Intake Score = 42     Precautions: Standard      Time In: 7:31 am  Time Out: 8:33 am  Total: 55 mins    PTA Visit #: 5/5       Subjective     Pt reports: no change  She was compliant with home exercise program.  Response to previous treatment: she felt ok after last visit, pain was not worse  Functional change: none    Pain: 6/10  Location: bilateral back      Prior Level of Function: independent   Current Level of Function: independent just takes breaks as needed    Objective      Objective Measures updated at progress report unless specified.     Treatment     Adele received the treatments listed below:      therapeutic exercises to develop strength, endurance, ROM, flexibility, and posture for 15 minutes including:  NuStep x 5 minutes  Hamstring Stretch @ Steps (B) 3 x 30 seconds  Cybex back ext - 3 plates   Bridges with Band - green 30 x 3 sec hold    manual therapy techniques: Joint mobilizations, Manual traction, and Myofacial release were applied to the: hip/back for 0 minutes, including:  Long Axis Traction  Manual Piriformis    neuromuscular re-education activities to improve: Kinesthetic, Sense, Proprioception, and Posture for 21 minutes. The following activities were included:  Pelvic tilts 10 x 10 second hold   Tilt with march with green theraband x 30  Retraction with  Extension - blue band x 30  Rows high - 3 plates x 20  Rows low - 3 plates x 20    therapeutic activities to improve functional performance for 14 minutes, including:  Swiss ball: Supine rolls for flexion stretch 10 x 10 second hold          Seated flexion rollouts 10 x 10 second hold          Seated lateral flexion rollouts 10 x 5 second hold each way    IFC electrical stimulation to decrease pain in low back after treatment, seated in chair with moist heat applied to same area,  MHz x 15 minutes after being cleared for contraindications. Patient had no signs of adverse reaction following modality.    Patient Education and Home Exercises       Education provided:   - None    Written Home Exercises Provided: Patient instructed to cont prior HEP. Exercises were reviewed and Adele was able to demonstrate them prior to the end of the session.  Adele demonstrated good  understanding of the education provided. See EMR under Patient Instructions for exercises provided during therapy sessions    Assessment     Currently:  Adele was able to increase repetitions with hooklying exercises as well as scapular retraction with extension without complaint today. Ended with IFC again secondary to less pain after last use. Will continue to monitor and progress as able.      Initially:  Adele is a 52 y.o. female referred to outpatient Physical Therapy with a medical diagnosis of low back pain. Patient presents with long history of low back pain. She states the pain increases with prolonged standing, walking and sweeping/mopping. She also states it will start hurting sometimes when she is just sitting. She has normal trunk range of motion except for extension. This is limited and painful. Flexion seems to improve her pain. Will work on improving core strength, lumbar stability, hamstring flexibility and trunk extension range of motion.     Adele Is progressing well towards her goals.   Pt prognosis is Good.     Pt will  continue to benefit from skilled outpatient physical therapy to address the deficits listed in the problem list box on initial evaluation, provide pt/family education and to maximize pt's level of independence in the home and community environment.     Pt's spiritual, cultural and educational needs considered and pt agreeable to plan of care and goals.     Anticipated barriers to physical therapy: None    Goals: Patient will be independent with home exercise program to facilitate carryover between visits.  Patient will be able to stand and walk for >/= to 1 hour with < 2/10 pain for work related tasks.  Patient will be able to sweep and mop at work with < 2/10 pain.   Patient will improve hamstring length >/= 10 degrees bilaterally for improved mobility.    Plan     Plan of care Certification: 7/24/2023 to 9/1/2023.     Outpatient Physical Therapy 2 times weekly for 6 weeks to include the following interventions: Cervical/Lumbar Traction, Electrical Stimulation IFC/premod as needed, Manual Therapy, Moist Heat/ Ice, Neuromuscular Re-ed, Patient Education, Therapeutic Activities, and Therapeutic Exercise.     Garima Osuna, PTA   08/15/2023

## 2023-08-22 ENCOUNTER — CLINICAL SUPPORT (OUTPATIENT)
Dept: REHABILITATION | Facility: HOSPITAL | Age: 53
End: 2023-08-22
Payer: COMMERCIAL

## 2023-08-22 DIAGNOSIS — R26.2 DIFFICULTY WALKING: Primary | ICD-10-CM

## 2023-08-22 DIAGNOSIS — R26.81 DIFFICULTY STANDING: ICD-10-CM

## 2023-08-22 PROCEDURE — 97530 THERAPEUTIC ACTIVITIES: CPT | Mod: CQ

## 2023-08-22 PROCEDURE — 97112 NEUROMUSCULAR REEDUCATION: CPT | Mod: CQ

## 2023-08-22 PROCEDURE — 97110 THERAPEUTIC EXERCISES: CPT | Mod: CQ

## 2023-08-22 PROCEDURE — 97012 MECHANICAL TRACTION THERAPY: CPT

## 2023-08-22 NOTE — PROGRESS NOTES
[facility]   Physical Therapy Treatment Note      Name: Adele Hidalgott  Clinic Number: [id number]  Physician: Paradise Schaffer MD    Visit Date: 8/22/2023    Encounter Diagnosis   Name Primary?    Chronic low back pain, unspecified back pain laterality, unspecified whether sciatica present          Physician: Paradise Schaffer MD     Physician Orders: PT Eval and Treat   Medical Diagnosis from Referral: see above  Evaluation Date: 7/24/2023  Authorization Period Expiration: 12/31/2023  Plan of Care Expiration: 9/1/2023     Visit # / Visits authorized: 6 / 16 - HARD MAX - (used 4 visits in Jan)   FOTO: 1/3 Intake Score = 42     Precautions: Standard      Time In: 7:41 am  Time Out: 8:18 am  Total: 30 mins    PTA Visit #: 1/5       Subjective     Pt reports: no change, her back hurts  She was compliant with home exercise program.  Response to previous treatment: she felt ok after last visit, pain was not worse  Functional change: none    Pain: 7/10  Location: bilateral back      Prior Level of Function: independent   Current Level of Function: independent just takes breaks as needed    Objective      Objective Measures updated at progress report unless specified.   Cotreatment with LAZARO Catherine PT, for supervisory visit.      Treatment     Adele received the treatments listed below:      therapeutic exercises to develop strength, endurance, ROM, flexibility, and posture for 12 minutes including:  NuStep x 5 minutes  Hamstring Stretch @ Steps (B) 3 x 30 seconds  Bridges with Band - green 30 x 3 sec hold    manual therapy techniques: Joint mobilizations, Manual traction, and Myofacial release were applied to the: hip/back for 0 minutes, including:  Long Axis Traction  Manual Piriformis    neuromuscular re-education activities to improve: Kinesthetic, Sense, Proprioception, and Posture for 8 minutes. The following activities were included:  Pelvic tilts 10 x 10 second hold   Tilt with  march with green theraband x 30  Retraction with Extension - blue band x 30  Rows high - 3 plates   Rows low - 3 plates     therapeutic activities to improve functional performance for 10 minutes, including:  Swiss ball: Supine rolls for flexion stretch           Seated flexion rollouts 10 x 10 second hold          Seated lateral flexion rollouts 10 x 5 second hold each way    IFC electrical stimulation to decrease pain in low back after treatment, seated in chair with moist heat applied to same area,  MHz x 0 minutes after being cleared for contraindications. Patient had no signs of adverse reaction following modality.    Patient Education and Home Exercises       Education provided:   - None    Written Home Exercises Provided: Patient instructed to cont prior HEP. Exercises were reviewed and Adele was able to demonstrate them prior to the end of the session.  Adele demonstrated good  understanding of the education provided. See EMR under Patient Instructions for exercises provided during therapy sessions    Assessment     Currently:  Adele was reassessed by LAZARO Catherine PT, for supervisory visit. Since she has not had much change in pain, traction was recommended. Continuation of core stabilization exercises was also recommended.  She completed exercises per note and then received traction per LAZARO Catherine PT.      Initially:  Adele is a 52 y.o. female referred to outpatient Physical Therapy with a medical diagnosis of low back pain. Patient presents with long history of low back pain. She states the pain increases with prolonged standing, walking and sweeping/mopping. She also states it will start hurting sometimes when she is just sitting. She has normal trunk range of motion except for extension. This is limited and painful. Flexion seems to improve her pain. Will work on improving core strength, lumbar stability, hamstring flexibility and trunk extension range of motion.     Adele Is  progressing well towards her goals.   Pt prognosis is Good.     Pt will continue to benefit from skilled outpatient physical therapy to address the deficits listed in the problem list box on initial evaluation, provide pt/family education and to maximize pt's level of independence in the home and community environment.     Pt's spiritual, cultural and educational needs considered and pt agreeable to plan of care and goals.     Anticipated barriers to physical therapy: None    Goals: Patient will be independent with home exercise program to facilitate carryover between visits.  Patient will be able to stand and walk for >/= to 1 hour with < 2/10 pain for work related tasks.  Patient will be able to sweep and mop at work with < 2/10 pain.   Patient will improve hamstring length >/= 10 degrees bilaterally for improved mobility.    Plan     Plan of care Certification: 7/24/2023 to 9/1/2023.     Outpatient Physical Therapy 2 times weekly for 6 weeks to include the following interventions: Cervical/Lumbar Traction, Electrical Stimulation IFC/premod as needed, Manual Therapy, Moist Heat/ Ice, Neuromuscular Re-ed, Patient Education, Therapeutic Activities, and Therapeutic Exercise.     Garima Osuna, PTA   08/22/2023

## 2023-08-23 NOTE — PLAN OF CARE
[facility]  Physical Therapy Plan of Care Note     Name: Adele Solares  Clinic Number: [id number]    Therapy Diagnosis:   Encounter Diagnoses   Name Primary?    Difficulty walking Yes    Difficulty standing      Physician: Paradise Schaffer MD    Visit Date: 8/22/2023    Physician Orders: Eval and Treat Low back pain  Medical Diagnosis from Referral: Low back pain  Evaluation Date: 7/24/23  Authorization Period Expiration: 8/24/23   Plan of Care Expiration: 11/14/23    Visit # / Visits authorized: 6/ 16  Time In : 822  Time Out: 845    Subjective     Update: Patient states she has not had improvement since evaluation. Still hurts in the same places with minimal results    Objective      Update: Please see Garima Osuna PTA note for today's treatment    Assessment     Update: Patient continues to have a high pain level and difficulty with activities of daily living and prolonged positions. Patient has not received lumbar traction at this time but did attempt traction today for spinal decompression. Will re-assess traction outcomes next session. If patient continues to not have change in status then she will be referred back to MD for further advice.     Mechanical lumbar traction 90/90 x 15 minutes 45/15 rest 52lb pull    Previous Short Term Goals Status:   Goals: Patient will be independent with home exercise program to facilitate carryover between visits.  Patient will be able to stand and walk for >/= to 1 hour with < 2/10 pain for work related tasks.  Patient will be able to sweep and mop at work with < 2/10 pain.   Patient will improve hamstring length >/= 10 degrees bilaterally for improved mobility.  New Short Term Goals Status:   Goals: Patient will be independent with home exercise program to facilitate carryover between visits.  Patient will be able to stand and walk for >/= to 1 hour with < 4/10 pain for work related tasks.  Patient will be able to sweep and mop at work  with < 4/10 pain.   Patient will improve hamstring length >/= 10 degrees bilaterally for improved mobility.    Reasons for Recertification of Therapy:   to continue to progress toward goals and to promote independence for home exercise program for pain control.        Plan     Updated Certification Period: 8/22/23 to 11/14/23   Recommended Treatment Plan: 2 times per week for 12 weeks:  Aquatic Therapy, Cervical/Lumbar Traction, Electrical Stimulation IFC/Premod, Iontophoresis (with Dex), Manual Therapy, Moist Heat/ Ice, Neuromuscular Re-ed, Patient Education, Therapeutic Activities, Therapeutic Exercise, Ultrasound, and Dry Needling  Other Recommendations: MRI    ELENA CRAVEN, PT

## 2023-08-24 ENCOUNTER — CLINICAL SUPPORT (OUTPATIENT)
Dept: REHABILITATION | Facility: HOSPITAL | Age: 53
End: 2023-08-24
Payer: COMMERCIAL

## 2023-08-24 DIAGNOSIS — R26.81 DIFFICULTY STANDING: ICD-10-CM

## 2023-08-24 DIAGNOSIS — R26.2 DIFFICULTY WALKING: Primary | ICD-10-CM

## 2023-08-24 PROCEDURE — 97112 NEUROMUSCULAR REEDUCATION: CPT | Mod: CQ

## 2023-08-24 PROCEDURE — 97110 THERAPEUTIC EXERCISES: CPT | Mod: CQ

## 2023-08-24 PROCEDURE — 97530 THERAPEUTIC ACTIVITIES: CPT | Mod: CQ

## 2023-08-24 PROCEDURE — 97012 MECHANICAL TRACTION THERAPY: CPT | Mod: CQ

## 2023-08-24 NOTE — PROGRESS NOTES
[facility]   Physical Therapy Treatment Note      Name: Adele Solares  Clinic Number: [id number]  Physician: Paradise Schaffer MD    Visit Date: 8/24/2023    Encounter Diagnosis   Name Primary?    Chronic low back pain, unspecified back pain laterality, unspecified whether sciatica present          Physician: Paradise Schaffer MD     Physician Orders: PT Eval and Treat   Medical Diagnosis from Referral: see above  Evaluation Date: 7/24/2023  Authorization Period Expiration: 12/31/2023  Plan of Care Expiration: 9/1/2023     Visit # / Visits authorized: 8 / 16 - [facility]ARD [facility] - (used 4 visits in Jan)   FOTO: 1/3 Intake Score = 42     Precautions: Standard      Time In: 7:41 am  Time Out: 8:38 am  Total: 57 mins    PTA Visit #: 2/5       Subjective     Pt reports: she felt better after traction; she thinks it helped.  She was compliant with home exercise program.  Response to previous treatment: she felt ok after last visit, pain was not worse  Functional change: none    Pain: 7/10  Location: bilateral back      Prior Level of Function: independent   Current Level of Function: independent just takes breaks as needed    Objective      Objective Measures updated at progress report unless specified.     Treatment     Adele received the treatments listed below:      therapeutic exercises to develop strength, endurance, ROM, flexibility, and posture for 12 minutes including:  NuStep x 5 minutes  Hamstring Stretch @ Steps (B) 3 x 30 seconds  Bridges with Band - green 30 x 3 sec hold    manual therapy techniques: Joint mobilizations, Manual traction, and Myofacial release were applied to the: hip/back for 0 minutes, including:  Long Axis Traction  Manual Piriformis    neuromuscular re-education activities to improve: Kinesthetic, Sense, Proprioception, and Posture for 15 minutes. The following activities were included:  Pelvic tilts 10 x 10 second hold   Tilt with march with green theraband x  30  Retraction with Extension - blue band x 30  Rows high - 3 plates   Rows low - 3 plates     therapeutic activities to improve functional performance for 10 minutes, including:  Swiss ball: Supine rolls for flexion stretch           Seated flexion rollouts 10 x 10 second hold          Seated lateral flexion rollouts 10 x 5 second hold each way    Mechanical lumbar traction at 90/90, 45 second hold, 15 second rest, 52 lbs of pull x 15 minutes.    Patient Education and Home Exercises       Education provided:   - None    Written Home Exercises Provided: Patient instructed to cont prior HEP. Exercises were reviewed and Adele was able to demonstrate them prior to the end of the session.  Adele demonstrated good  understanding of the education provided. See EMR under Patient Instructions for exercises provided during therapy sessions    Assessment     Currently:  Adele reported feeling better after traction so repeated at end of treatment today. She performed exercises first. Patient is able to complete exercises with minimal effort but reports they increase her pain despite keeping exercises light. Will continue to progress as tolerated.      Initially:  Adele is a 52 y.o. female referred to outpatient Physical Therapy with a medical diagnosis of low back pain. Patient presents with long history of low back pain. She states the pain increases with prolonged standing, walking and sweeping/mopping. She also states it will start hurting sometimes when she is just sitting. She has normal trunk range of motion except for extension. This is limited and painful. Flexion seems to improve her pain. Will work on improving core strength, lumbar stability, hamstring flexibility and trunk extension range of motion.     Adele Is progressing well towards her goals.   Pt prognosis is Good.     Pt will continue to benefit from skilled outpatient physical therapy to address the deficits listed in the problem list box on  initial evaluation, provide pt/family education and to maximize pt's level of independence in the home and community environment.     Pt's spiritual, cultural and educational needs considered and pt agreeable to plan of care and goals.     Anticipated barriers to physical therapy: None    Goals:   Patient will be independent with home exercise program to facilitate carryover between visits.  Patient will be able to stand and walk for >/= to 1 hour with < 4/10 pain for work related tasks.  Patient will be able to sweep and mop at work with < 4/10 pain.   Patient will improve hamstring length >/= 10 degrees bilaterally for improved mobility.    Plan     Plan of care Certification: 7/24/2023 to 9/1/2023.     Outpatient Physical Therapy 2 times weekly for 6 weeks to include the following interventions: Cervical/Lumbar Traction, Electrical Stimulation IFC/premod as needed, Manual Therapy, Moist Heat/ Ice, Neuromuscular Re-ed, Patient Education, Therapeutic Activities, and Therapeutic Exercise.     Garima Osuna, PTA   08/24/2023

## 2023-08-29 ENCOUNTER — CLINICAL SUPPORT (OUTPATIENT)
Dept: REHABILITATION | Facility: HOSPITAL | Age: 53
End: 2023-08-29
Payer: COMMERCIAL

## 2023-08-29 DIAGNOSIS — M54.50 CHRONIC LOW BACK PAIN, UNSPECIFIED BACK PAIN LATERALITY, UNSPECIFIED WHETHER SCIATICA PRESENT: ICD-10-CM

## 2023-08-29 DIAGNOSIS — R26.81 DIFFICULTY STANDING: ICD-10-CM

## 2023-08-29 DIAGNOSIS — G89.29 CHRONIC LOW BACK PAIN, UNSPECIFIED BACK PAIN LATERALITY, UNSPECIFIED WHETHER SCIATICA PRESENT: ICD-10-CM

## 2023-08-29 DIAGNOSIS — R26.2 DIFFICULTY WALKING: Primary | ICD-10-CM

## 2023-08-29 PROCEDURE — 97110 THERAPEUTIC EXERCISES: CPT

## 2023-08-29 PROCEDURE — 97012 MECHANICAL TRACTION THERAPY: CPT

## 2023-08-29 PROCEDURE — 97112 NEUROMUSCULAR REEDUCATION: CPT

## 2023-08-29 NOTE — PROGRESS NOTES
" [facility]   Physical Therapy Treatment Note/Discharge Summary     Name: Adele Solares  Clinic Number: [id number]  Physician: Paradise Schaffer MD    Visit Date: 8/29/2023    Encounter Diagnosis   Name Primary?    Chronic low back pain, unspecified back pain laterality, unspecified whether sciatica present          Physician: Paradise Schaffer MD     Physician Orders: PT Eval and Treat   Medical Diagnosis from Referral: see above  Evaluation Date: 7/24/2023  Authorization Period Expiration: 12/31/2023  Plan of Care Expiration: 9/1/2023     Visit # / Visits authorized: 9 / 16 - HARD [facility] - (used 4 visits in Jan)   FOTO: 1/3 Intake Score = 42              2/3 Status = 38    Precautions: Standard      Time In: 7:46 am  Time Out: 8:40 am  Total: 37 mins    PTA Visit #:        Subjective     Pt reports: says her back is "killing her" this morning - did say traction helped some last visit but didn't last  She was compliant with home exercise program.  Response to previous treatment: traction helped but relief did not last  Functional change: no lasting improvement with pain    Pain: 9/10  Location: bilateral back      Prior Level of Function: independent   Current Level of Function: independent just takes breaks as needed    Objective      Objective Measures updated at progress report unless specified.     Treatment     Adele received the treatments listed below:      therapeutic exercises to develop strength, endurance, ROM, flexibility, and posture for 10 minutes including:  NuStep x 5 minutes  Slant board stretch 3 x 30 second hold   Hamstring Stretch @ Steps (B) - seconds  Bridges with Band - green 2 x 3 sec hold - stopped due to pain    manual therapy techniques: Joint mobilizations, Manual traction, and Myofacial release were applied to the: hip/back for 0 minutes, including:  Long Axis Traction  Manual Piriformis    neuromuscular re-education activities to improve: Kinesthetic, Sense, " Proprioception, and Posture for 8 minutes. The following activities were included:  Pelvic tilts 20 x 5 second hold   Tilt with march with green theraband -  Retraction with Extension - blue band x 20  Rows high - 3 plates   Rows low - 3 plates     therapeutic activities to improve functional performance for 4 minutes, including:  Swiss ball: Supine hamstring rolls x 20          Seated flexion rollouts - second hold          Seated lateral flexion rollouts - second hold each way    Mechanical lumbar traction at 90/90, 60 second hold, 20 second rest, 50 lbs of pull x 15 minutes.    Patient Education and Home Exercises       Education provided:   - None    Written Home Exercises Provided: Patient instructed to cont prior HEP. Exercises were reviewed and Adele was able to demonstrate them prior to the end of the session.  Adele demonstrated good  understanding of the education provided. See EMR under Patient Instructions for exercises provided during therapy sessions    Assessment     Currently:  Adele reported her back was killing her this morning. She has gotten some temporary relief from traction so we repeated it again today. She was unable to tolerate many exercises today. Patient states she has not really had any improvement in her pain. She returns to Dr. Schaffer on 9/6. Will stop therapy at this time secondary to no lasting improvement with pain and increase in pain with most exercises. Patient has not had an MRI of lumbar spine to date. Adele was compliant with therapy and has completed her plan of care. Refer back to MD at this time.      Initially:  Adele is a 52 y.o. female referred to outpatient Physical Therapy with a medical diagnosis of low back pain. Patient presents with long history of low back pain. She states the pain increases with prolonged standing, walking and sweeping/mopping. She also states it will start hurting sometimes when she is just sitting. She has normal trunk range of  motion except for extension. This is limited and painful. Flexion seems to improve her pain. Will work on improving core strength, lumbar stability, hamstring flexibility and trunk extension range of motion.     Adele Is progressing well towards her goals.   Pt prognosis is Good.     Pt will continue to benefit from skilled outpatient physical therapy to address the deficits listed in the problem list box on initial evaluation, provide pt/family education and to maximize pt's level of independence in the home and community environment.     Pt's spiritual, cultural and educational needs considered and pt agreeable to plan of care and goals.     Anticipated barriers to physical therapy: None    Goals:   Patient will be independent with home exercise program to facilitate carryover between visits. MET  Patient will be able to stand and walk for >/= to 1 hour with < 4/10 pain for work related tasks. NOT MET  Patient will be able to sweep and mop at work with < 4/10 pain. NOT MET  Patient will improve hamstring length >/= 10 degrees bilaterally for improved mobility. MET    Plan     Discharge from physical therapy and refer back to MD at this time.    Plan of care Certification: 7/24/2023 to 9/1/2023.     Outpatient Physical Therapy 2 times weekly for 6 weeks to include the following interventions: Cervical/Lumbar Traction, Electrical Stimulation IFC/premod as needed, Manual Therapy, Moist Heat/ Ice, Neuromuscular Re-ed, Patient Education, Therapeutic Activities, and Therapeutic Exercise.     RICARDO HERRON, PT   08/29/2023

## 2023-08-30 RX ORDER — OMEPRAZOLE 40 MG/1
40 CAPSULE, DELAYED RELEASE ORAL DAILY
Qty: 90 CAPSULE | Refills: 3 | Status: SHIPPED | OUTPATIENT
Start: 2023-08-30 | End: 2023-11-30 | Stop reason: SDUPTHER

## 2023-09-05 NOTE — PROGRESS NOTES
She Disclaimer: This note has been generated using voice-recognition software. There may be typographical errors that have been missed during proof-reading        Patient ID: Adele Solares is a 53 y.o. female.      Chief Complaint: No chief complaint on file.      53-year-old female returns after completion of physical therapy.  The remaining couple of sessions were canceled due to lack of improvement by the Physical Therapist.  Her current medications are providing minimal relief.  X-rays revealed degenerative changes and stenosis.  The pain started several years ago and has worsened over time and occasionally radiates to the lower extremities.  She denies falling, bowel or bladder involvement.  She returns today to discuss scheduling MRI of the lumbar spine and to consider surgical options versus nerve block intervention.  She denies any changes from the previous office visit.                    Pain Assessment  Pain Assessment: 0-10  Pain Score:   8  Pain Location: Back  Pain Orientation: Lower  Pain Descriptors: Aching, Constant  Pain Frequency: Continuous  Clinical Progression: Gradually worsening  Aggravating Factors: Exercise, Standing, Walking  Pain Intervention(s): Medication (See eMAR), Rest      A's of Opioid Risk Assessment  Activity:Patient has difficulty performing ADL.   Analgesia:Patients pain is not controlled by current medication.   Adverse Effects: Patient denies constipation or sedation.  Aberrant Behavior:  reviewed with no aberrant drug seeking/taking behavior.      Patient denies any suicidal or homicidal ideations    Physical Therapy/Home Exercise: yes      X-Ray Sacroiliac Joints Complete  Narrative: EXAMINATION:  XR LUMBAR SPINE 5 VIEW WITH FLEX AND EXT; XR SACROILIAC JOINTS COMPLETE    CLINICAL HISTORY:  Lower back pain    TECHNIQUE:  XR LUMBAR SPINE 5 VIEW WITH FLEX AND EXT; XR SACROILIAC JOINTS COMPLETE    COMPARISON:  2019    FINDINGS:  No acute fracture or dislocation.  No  dynamic instability.  Mild multilevel degenerative change of the lumbar spine.    The sacroiliac joints are normal.  Impression: As above    Electronically signed by: Jaskaran Mcdonald  Date:    07/10/2023  Time:    14:23  X-Ray Lumbar Complete Including Flex And Ext  Narrative: EXAMINATION:  XR LUMBAR SPINE 5 VIEW WITH FLEX AND EXT; XR SACROILIAC JOINTS COMPLETE    CLINICAL HISTORY:  Lower back pain    TECHNIQUE:  XR LUMBAR SPINE 5 VIEW WITH FLEX AND EXT; XR SACROILIAC JOINTS COMPLETE    COMPARISON:  2019    FINDINGS:  No acute fracture or dislocation.  No dynamic instability.  Mild multilevel degenerative change of the lumbar spine.    The sacroiliac joints are normal.  Impression: As above    Electronically signed by: Jaskaran Mcdonald  Date:    07/10/2023  Time:    14:23      Review of Systems   Constitutional: Negative.    HENT: Negative.     Eyes: Negative.    Respiratory: Negative.     Cardiovascular: Negative.    Gastrointestinal: Negative.    Endocrine: Negative.    Genitourinary: Negative.    Musculoskeletal:  Positive for arthralgias and back pain.   Integumentary:  Negative.   Neurological: Negative.    Hematological: Negative.    Psychiatric/Behavioral: Negative.               Past Medical History:   Diagnosis Date    Hypertension     Thyroid disease      Past Surgical History:   Procedure Laterality Date    HYSTERECTOMY      TUBAL LIGATION       Social History     Socioeconomic History    Marital status: Single   Tobacco Use    Smoking status: Every Day     Current packs/day: 0.50     Average packs/day: 0.5 packs/day for 30.0 years (15.0 ttl pk-yrs)     Types: Cigarettes    Smokeless tobacco: Never    Tobacco comments:     Been smoking for over 30 years   Substance and Sexual Activity    Alcohol use: Yes     Alcohol/week: 1.0 standard drink of alcohol     Types: 1 Glasses of wine per week     Comment: A glass of wine maybe twice a month    Drug use: Never    Sexual activity: Yes     Partners: Male      Birth control/protection: None     Family History   Problem Relation Age of Onset    Cancer Mother     Arthritis Maternal Aunt     Cancer Sister      Review of patient's allergies indicates:  No Known Allergies  has a current medication list which includes the following prescription(s): gabapentin, ibuprofen, levothyroxine, losartan-hydrochlorothiazide 50-12.5 mg, omeprazole, acetaminophen-codeine 300-30mg, celecoxib, and gabapentin.      Objective:  Vitals:    09/06/23 0828   BP: 121/80   Pulse: 92   Resp: 18          Physical Exam  Vitals and nursing note reviewed.   Constitutional:       General: She is not in acute distress.     Appearance: Normal appearance. She is not ill-appearing, toxic-appearing or diaphoretic.   HENT:      Head: Normocephalic and atraumatic.      Nose: Nose normal.      Mouth/Throat:      Mouth: Mucous membranes are moist.   Eyes:      Extraocular Movements: Extraocular movements intact.      Pupils: Pupils are equal, round, and reactive to light.   Cardiovascular:      Rate and Rhythm: Normal rate and regular rhythm.      Heart sounds: Normal heart sounds.   Pulmonary:      Effort: Pulmonary effort is normal. No respiratory distress.      Breath sounds: Normal breath sounds. No stridor. No wheezing or rhonchi.   Abdominal:      General: Bowel sounds are normal.      Palpations: Abdomen is soft.   Musculoskeletal:         General: No swelling or deformity.      Cervical back: Normal and normal range of motion. No spasms or tenderness. No pain with movement. Normal range of motion.      Thoracic back: Normal.      Lumbar back: Tenderness and bony tenderness present. No spasms. Decreased range of motion. Negative right straight leg raise test and negative left straight leg raise test. No scoliosis.      Right lower leg: No edema.      Left lower leg: No edema.      Comments: Lumbar pain with flexion, extension and lateral rotation. Facet tenderness to palpation from L3-S1. Bilateral SI joint  tenderness to palpation   Skin:     General: Skin is warm.   Neurological:      General: No focal deficit present.      Mental Status: She is alert and oriented to person, place, and time. Mental status is at baseline.      Cranial Nerves: No cranial nerve deficit.      Sensory: Sensation is intact. No sensory deficit.      Motor: No weakness.      Coordination: Coordination normal.      Gait: Gait normal.      Deep Tendon Reflexes: Reflexes are normal and symmetric.   Psychiatric:         Mood and Affect: Mood normal.         Behavior: Behavior normal.           Assessment:      1. Chronic low back pain, unspecified back pain laterality, unspecified whether sciatica present    2. Neck pain    3. Dorsalgia, unspecified    4. Lumbar radiculopathy, chronic    5. Spinal stenosis, lumbosacral region          Plan:  1. reviewed  2.Addiction, Dependency, Tolerance, Opioid abuse-misuse, Death, Diversion Discussed. Overdose reversal drug Naloxone discussed.  3.Refill/Continue medications for pain control and function       Requested Prescriptions     Signed Prescriptions Disp Refills    acetaminophen-codeine 300-30mg (TYLENOL #3) 300-30 mg Tab 60 tablet 0     Sig: Take 1 tablet by mouth every 12 (twelve) hours as needed (pain).    celecoxib (CELEBREX) 200 MG capsule 30 capsule 0     Sig: Take 1 capsule (200 mg total) by mouth once daily.    gabapentin (NEURONTIN) 300 MG capsule 90 capsule 0     Sig: Take 1 capsule (300 mg total) by mouth 3 (three) times daily.     4. Schedule  MRI of the lumbar spine. MRI is medically necessary for consideration of interventional nerve blocks or surgical intervention.  Patient has provided a medically directed home exercise program.  5. Return after the MRI for re-evaluation and  treatment options     report:  Reviewed and consistent with medication use as prescribed.      The total time spent for evaluation and management on 09/06/2023 including reviewing separately obtained history,  performing a medically appropriate exam and evaluation, documenting clinical information in the health record, independently interpreting results and communicating them to the patient/family/caregiver, and ordering medications/tests/procedures was between 15-29 minutes.    The above plan and management options were discussed at length with patient. Patient is in agreement with the above and verbalized understanding. It will be communicated with the referring physician via electronic record, fax, or mail.

## 2023-09-06 ENCOUNTER — OFFICE VISIT (OUTPATIENT)
Dept: PAIN MEDICINE | Facility: CLINIC | Age: 53
End: 2023-09-06
Payer: COMMERCIAL

## 2023-09-06 VITALS
WEIGHT: 183 LBS | HEIGHT: 67 IN | BODY MASS INDEX: 28.72 KG/M2 | RESPIRATION RATE: 18 BRPM | SYSTOLIC BLOOD PRESSURE: 121 MMHG | HEART RATE: 92 BPM | DIASTOLIC BLOOD PRESSURE: 80 MMHG

## 2023-09-06 DIAGNOSIS — M48.07 SPINAL STENOSIS, LUMBOSACRAL REGION: ICD-10-CM

## 2023-09-06 DIAGNOSIS — M54.9 DORSALGIA, UNSPECIFIED: ICD-10-CM

## 2023-09-06 DIAGNOSIS — M54.50 CHRONIC LOW BACK PAIN, UNSPECIFIED BACK PAIN LATERALITY, UNSPECIFIED WHETHER SCIATICA PRESENT: Primary | Chronic | ICD-10-CM

## 2023-09-06 DIAGNOSIS — M54.2 NECK PAIN: Chronic | ICD-10-CM

## 2023-09-06 DIAGNOSIS — G89.29 CHRONIC LOW BACK PAIN, UNSPECIFIED BACK PAIN LATERALITY, UNSPECIFIED WHETHER SCIATICA PRESENT: Primary | Chronic | ICD-10-CM

## 2023-09-06 DIAGNOSIS — M54.16 LUMBAR RADICULOPATHY, CHRONIC: ICD-10-CM

## 2023-09-06 PROCEDURE — 3079F PR MOST RECENT DIASTOLIC BLOOD PRESSURE 80-89 MM HG: ICD-10-PCS | Mod: S$GLB,,, | Performed by: PAIN MEDICINE

## 2023-09-06 PROCEDURE — 3074F PR MOST RECENT SYSTOLIC BLOOD PRESSURE < 130 MM HG: ICD-10-PCS | Mod: S$GLB,,, | Performed by: PAIN MEDICINE

## 2023-09-06 PROCEDURE — 1159F PR MEDICATION LIST DOCUMENTED IN MEDICAL RECORD: ICD-10-PCS | Mod: S$GLB,,, | Performed by: PAIN MEDICINE

## 2023-09-06 PROCEDURE — 3008F BODY MASS INDEX DOCD: CPT | Mod: S$GLB,,, | Performed by: PAIN MEDICINE

## 2023-09-06 PROCEDURE — 99214 OFFICE O/P EST MOD 30 MIN: CPT | Mod: S$GLB,,, | Performed by: PAIN MEDICINE

## 2023-09-06 PROCEDURE — 99214 PR OFFICE/OUTPT VISIT, EST, LEVL IV, 30-39 MIN: ICD-10-PCS | Mod: S$GLB,,, | Performed by: PAIN MEDICINE

## 2023-09-06 PROCEDURE — 1159F MED LIST DOCD IN RCRD: CPT | Mod: S$GLB,,, | Performed by: PAIN MEDICINE

## 2023-09-06 PROCEDURE — 3008F PR BODY MASS INDEX (BMI) DOCUMENTED: ICD-10-PCS | Mod: S$GLB,,, | Performed by: PAIN MEDICINE

## 2023-09-06 PROCEDURE — 3079F DIAST BP 80-89 MM HG: CPT | Mod: S$GLB,,, | Performed by: PAIN MEDICINE

## 2023-09-06 PROCEDURE — 3074F SYST BP LT 130 MM HG: CPT | Mod: S$GLB,,, | Performed by: PAIN MEDICINE

## 2023-09-06 PROCEDURE — 99214 OFFICE O/P EST MOD 30 MIN: CPT | Mod: PBBFAC | Performed by: PAIN MEDICINE

## 2023-09-06 RX ORDER — ACETAMINOPHEN AND CODEINE PHOSPHATE 300; 30 MG/1; MG/1
1 TABLET ORAL EVERY 12 HOURS PRN
Qty: 60 TABLET | Refills: 0 | Status: SHIPPED | OUTPATIENT
Start: 2023-09-06 | End: 2023-09-21 | Stop reason: SDUPTHER

## 2023-09-06 RX ORDER — GABAPENTIN 300 MG/1
300 CAPSULE ORAL 3 TIMES DAILY
Qty: 90 CAPSULE | Refills: 0 | Status: SHIPPED | OUTPATIENT
Start: 2023-09-06 | End: 2023-09-21 | Stop reason: SDUPTHER

## 2023-09-06 RX ORDER — CELECOXIB 200 MG/1
200 CAPSULE ORAL DAILY
Qty: 30 CAPSULE | Refills: 0 | Status: SHIPPED | OUTPATIENT
Start: 2023-09-06 | End: 2023-09-21 | Stop reason: SDUPTHER

## 2023-09-21 PROBLEM — M47.812 CERVICAL SPONDYLOSIS WITHOUT MYELOPATHY: Chronic | Status: ACTIVE | Noted: 2023-09-21

## 2023-09-21 PROBLEM — M47.817 LUMBOSACRAL SPONDYLOSIS WITHOUT MYELOPATHY: Chronic | Status: ACTIVE | Noted: 2023-09-21

## 2023-09-21 NOTE — PROGRESS NOTES
Subjective:         Patient ID: Adele Solares is a 53 y.o. female.    Chief Complaint: Back Pain      Pain  This is a chronic problem. The current episode started more than 1 year ago. The problem occurs daily. The problem has been waxing and waning. Associated symptoms include arthralgias. Pertinent negatives include no anorexia, change in bowel habit, chest pain, chills, coughing, diaphoresis, fever, rash, sore throat, swollen glands, urinary symptoms, vertigo or vomiting.     Review of Systems   Constitutional:  Negative for activity change, appetite change, chills, diaphoresis, fever and unexpected weight change.   HENT:  Negative for drooling, ear discharge, ear pain, facial swelling, nosebleeds, sore throat, trouble swallowing, voice change and goiter.    Eyes:  Negative for photophobia, pain, discharge, redness and visual disturbance.   Respiratory:  Negative for apnea, cough, choking, chest tightness, shortness of breath, wheezing and stridor.    Cardiovascular:  Negative for chest pain, palpitations and leg swelling.   Gastrointestinal:  Negative for abdominal distention, anorexia, change in bowel habit, diarrhea, rectal pain, vomiting and fecal incontinence.   Endocrine: Negative for cold intolerance, heat intolerance, polydipsia, polyphagia and polyuria.   Genitourinary:  Negative for bladder incontinence, dysuria, flank pain, frequency and hot flashes.   Musculoskeletal:  Positive for arthralgias, back pain and leg pain.   Integumentary:  Negative for color change, pallor and rash.   Allergic/Immunologic: Negative for immunocompromised state.   Neurological:  Negative for dizziness, vertigo, seizures, syncope, facial asymmetry, speech difficulty, light-headedness, memory loss and coordination difficulties.   Hematological:  Negative for adenopathy. Does not bruise/bleed easily.   Psychiatric/Behavioral:  Negative for agitation, behavioral problems, confusion, decreased concentration, dysphoric  "mood, hallucinations, self-injury and suicidal ideas. The patient is not nervous/anxious and is not hyperactive.            Past Medical History:   Diagnosis Date    Hypertension     Thyroid disease      Past Surgical History:   Procedure Laterality Date    HYSTERECTOMY      TUBAL LIGATION       Social History     Socioeconomic History    Marital status: Single   Tobacco Use    Smoking status: Every Day     Current packs/day: 0.50     Average packs/day: 0.5 packs/day for 30.0 years (15.0 ttl pk-yrs)     Types: Cigarettes    Smokeless tobacco: Never    Tobacco comments:     Been smoking for over 30 years   Substance and Sexual Activity    Alcohol use: Yes     Alcohol/week: 1.0 standard drink of alcohol     Types: 1 Glasses of wine per week     Comment: A glass of wine maybe twice a month    Drug use: Never    Sexual activity: Yes     Partners: Male     Birth control/protection: None     Family History   Problem Relation Age of Onset    Cancer Mother     Arthritis Maternal Aunt     Cancer Sister      Review of patient's allergies indicates:  No Known Allergies     Objective:  Vitals:    09/25/23 1111 09/25/23 1112   BP: 125/73    Pulse: 70    Weight: 84.4 kg (186 lb)    Height: 5' 7" (1.702 m)    PainSc:   6   6         Physical Exam  Vitals and nursing note reviewed. Exam conducted with a chaperone present.   Constitutional:       General: She is awake. She is not in acute distress.     Appearance: Normal appearance. She is not ill-appearing, toxic-appearing or diaphoretic.   HENT:      Head: Normocephalic and atraumatic.      Nose: Nose normal.      Mouth/Throat:      Mouth: Mucous membranes are moist.      Pharynx: Oropharynx is clear.   Eyes:      Conjunctiva/sclera: Conjunctivae normal.      Pupils: Pupils are equal, round, and reactive to light.   Cardiovascular:      Rate and Rhythm: Normal rate.   Pulmonary:      Effort: Pulmonary effort is normal. No respiratory distress.   Abdominal:      Palpations: Abdomen " is soft.      Tenderness: There is no guarding.   Musculoskeletal:         General: Normal range of motion.      Cervical back: Normal range of motion and neck supple. No rigidity.   Skin:     General: Skin is warm and dry.      Coloration: Skin is not jaundiced or pale.   Neurological:      General: No focal deficit present.      Mental Status: She is alert and oriented to person, place, and time. Mental status is at baseline.      Cranial Nerves: No cranial nerve deficit (II-XII).   Psychiatric:         Mood and Affect: Mood normal.         Behavior: Behavior normal. Behavior is cooperative.         Thought Content: Thought content normal.           MRI Lumbar Spine Without Contrast  Narrative: EXAMINATION:  MRI LUMBAR SPINE WITHOUT CONTRAST    CLINICAL HISTORY:  Spinal stenosis, lumbar;Low back pain, symptoms persist with > 6wks conservative treatment;Lumbar radiculopathy, symptoms persist with conservative treatment; Dorsalgia, unspecified    TECHNIQUE:  Multiplanar, multisequence MR images were acquired from the thoracolumbar junction to the sacrum without the administration of contrast.    COMPARISON:  7/10/23    FINDINGS:  Alignment: Normal.    Vertebrae: Normal marrow signal. No fracture.    Discs: Moderate degenerative disc disease L4-5.  Mild multilevel degenerative disc disease otherwise.    Cord: Normal.    Degenerative findings:    L1-L2: Circumferential disc bulge and facet change results in mild spinal canal narrowing as well as mild bilateral neural foraminal narrowing.    L2-L3: Circumferential disc bulge and facet change results in mild spinal canal narrowing as well as mild bilateral neural foraminal narrowing.    L3-L4: Circumferential disc bulge and facet change results in mild spinal canal narrowing as well as mild bilateral neural foraminal narrowing.    L4-L5: Circumferential disc bulge and facet change results in mild spinal canal narrowing as well as mild bilateral neural foraminal  narrowing.    L5-S1: Circumferential disc bulge and facet change results in mild spinal canal narrowing as well as mild bilateral neural foraminal narrowing.    Paraspinal muscles & soft tissues: Unremarkable.  Impression: Multilevel degenerative change of the lumbar spine    Electronically signed by: Jaskaran Mcdonald  Date:    09/25/2023  Time:    09:46       Lab Visit on 07/10/2023   Component Date Value Ref Range Status    Vitamin D 25-Hydroxy, Blood 07/10/2023 13.0  ng/mL Final    Vitamin B12 07/10/2023 381  193 - 986 pg/mL Final   Office Visit on 07/10/2023   Component Date Value Ref Range Status    POC Amphetamines 07/10/2023 Negative  Negative, Inconclusive Final    POC Barbiturates 07/10/2023 Negative  Negative, Inconclusive Final    POC Benzodiazepines 07/10/2023 Negative  Negative, Inconclusive Final    POC Cocaine 07/10/2023 Negative  Negative, Inconclusive Final    POC THC 07/10/2023 Negative  Negative, Inconclusive Final    POC Methadone 07/10/2023 Negative  Negative, Inconclusive Final    POC Methamphetamine 07/10/2023 Negative  Negative, Inconclusive Final    POC Opiates 07/10/2023 Negative  Negative, Inconclusive Final    POC Oxycodone 07/10/2023 Negative  Negative, Inconclusive Final    POC Phencyclidine 07/10/2023 Negative  Negative, Inconclusive Final    POC Methylenedioxymethamphetamine * 07/10/2023 Negative  Negative, Inconclusive Final    POC Tricyclic Antidepressants 07/10/2023 Negative  Negative, Inconclusive Final    POC Buprenorphine 07/10/2023 Negative   Final     Acceptable 07/10/2023 Yes   Final    POC Temperature (Urine) 07/10/2023 92   Final    See Scanned Report 07/10/2023 See Scanned Result   Final         Orders Placed This Encounter   Procedures    Case Request Operating Room: Block-nerve-medial branch-lumbar, bilateral L4 through S1     Order Specific Question:   Medical Necessity:     Answer:   Medically Non-Urgent [100]     Order Specific Question:   CPT Code:      Answer:   IN INJ DX/THER AGNT PARAVERT FACET JOINT,IMG GUIDE,LUMBAR/SAC,1ST LVL [64504]     Order Specific Question:   CPT Code:     Answer:   IN INJ DX/THER AGNT PARAVERT FACET JOINT,IMG GUIDE,LUMBAR/SAC, 2ND LEVEL [62611]     Order Specific Question:   Is an on-site pathologist required for this procedure?     Answer:   N/A       Requested Prescriptions     Signed Prescriptions Disp Refills    acetaminophen-codeine 300-30mg (TYLENOL #3) 300-30 mg Tab 60 tablet 0     Sig: Take 1 tablet by mouth every 12 (twelve) hours as needed (pain).    celecoxib (CELEBREX) 200 MG capsule 30 capsule 0     Sig: Take 1 capsule (200 mg total) by mouth once daily.    gabapentin (NEURONTIN) 300 MG capsule 90 capsule 0     Sig: Take 1 capsule (300 mg total) by mouth 3 (three) times daily.       Assessment:     1. Lumbosacral spondylosis without myelopathy    2. Cervical spondylosis without myelopathy         A's of Opioid Risk Assessment  Activity:Patient can perform ADL.   Analgesia:Patients pain is partially controlled by current medication. Patient has tried OTC medications such as Tylenol and Ibuprofen with out relief.   Adverse Effects: Patient denies constipation or sedation.  Aberrant Behavior:  reviewed with no aberrant drug seeking/taking behavior.  Overdose reversal drug naloxone discussed    Drug screen reviewed      Plan:      Follow-up after MRI lumbar spine    Physical therapy notes [facility] through August 29, 2023 reviewed    Complaint of back pain worse with flexion extension rotation lumbar spine ongoing for more than 3 months facet joint in nature     MRI lumbar spine [facility] September 25, 2023 multiple level degenerative changes no significant neuroforaminal central canal stenosis noted    X-ray sacroiliac joint July 10, 2023 degenerative changes no fracture noted     X-ray lumbar spine [facility] July 10, 2023, degenerative changes no fracture noted     MRI cervical spine [facility] June 8, 2023  multiple level degenerative changes    She would like to proceed with lumbar medial branch block     Continue current medication    Continue home exercise program as directed     Indications for this procedure for this specific patient include the following   - Pt has had symptoms for three months with moderate to severe pain with functional impairment rated of 7/10 pain. Pain is severe enough to affect her quality of life and function  - Pain non-responsive to conservative care.    - Pain predominately axial and not associated with radiculopathy or claudication.    - No non-facet pathology as source of pain.    - Clinical assessment implicates facet joint as putative pain source.    - facet loading maneuver positive  - Pain is exacerbated by extension or prolonged sitting/standing and relieved by rest.    - No unexplained neurologic deficit.    - No history of coagulopathy, infection or unstable medical conditions.    - Pain is causing significant functional limitation resulting in diminished quality of life and impaired age appropriate ADL's.   - Clinical assessment implicates facet joint as putative source of pain  - Repeat injections not done prior to 7 days   - no more than 2 levels will be done    -procedure done prior to surgical consideration  The planned medically necessary  surgical procedure is performed in a hospital outpatient department and not in an ambulatory surgical center due to:     -there is no geographically assessable ambulatory surgery center that has the  necessary equipment and fluoroscopy needed for the procedure     -there is no geographically assessable ambulatory surgical center available at which the physician has privileges     -an ASC's  specific  guideline regarding the individuals weight or health conditions that prevent the use of an ASC     -Medial branch block performed in consideration for RFTC, not just for therapeutic treatment    -done under fluoro    Monitor anesthesia  request is medically indicated for the scheduled nerve block procedure due to:  1- needle phobia and anxiety, placing  the patient at risk during the provided service.  2-patient has an ASA class greater than 3 and requires constant presence of an anesthesiologist during the procedure,   3-patient has severe problems hard to lie still  4-patient suffers from chronic pain and is unable to function due to  diminished ADLs    Schedule bilateral lumbar L4 through S1 medial branch block # 1, lumbosacral spondylosis      Dr. Schaffer     Bring original prescription medication bottles/container/box with labels to each visit

## 2023-09-25 ENCOUNTER — HOSPITAL ENCOUNTER (OUTPATIENT)
Dept: RADIOLOGY | Facility: HOSPITAL | Age: 53
Discharge: HOME | End: 2023-09-25
Attending: PAIN MEDICINE
Payer: COMMERCIAL

## 2023-09-25 ENCOUNTER — OFFICE VISIT (OUTPATIENT)
Dept: PAIN MEDICINE | Facility: CLINIC | Age: 53
End: 2023-09-25
Payer: COMMERCIAL

## 2023-09-25 VITALS
HEIGHT: 67 IN | HEART RATE: 70 BPM | DIASTOLIC BLOOD PRESSURE: 73 MMHG | BODY MASS INDEX: 29.19 KG/M2 | SYSTOLIC BLOOD PRESSURE: 125 MMHG | WEIGHT: 186 LBS

## 2023-09-25 DIAGNOSIS — M47.812 CERVICAL SPONDYLOSIS WITHOUT MYELOPATHY: Chronic | ICD-10-CM

## 2023-09-25 DIAGNOSIS — M47.817 LUMBOSACRAL SPONDYLOSIS WITHOUT MYELOPATHY: Primary | Chronic | ICD-10-CM

## 2023-09-25 DIAGNOSIS — M48.07 SPINAL STENOSIS, LUMBOSACRAL REGION: ICD-10-CM

## 2023-09-25 DIAGNOSIS — M54.9 DORSALGIA, UNSPECIFIED: ICD-10-CM

## 2023-09-25 DIAGNOSIS — M54.16 LUMBAR RADICULOPATHY, CHRONIC: ICD-10-CM

## 2023-09-25 PROCEDURE — 72148 MRI LUMBAR SPINE W/O DYE: CPT | Mod: TC

## 2023-09-25 PROCEDURE — 1159F PR MEDICATION LIST DOCUMENTED IN MEDICAL RECORD: ICD-10-PCS | Mod: S$GLB,,, | Performed by: PHYSICIAN ASSISTANT

## 2023-09-25 PROCEDURE — 72148 MRI LUMBAR SPINE WITHOUT CONTRAST: ICD-10-PCS | Mod: 26,,, | Performed by: STUDENT IN AN ORGANIZED HEALTH CARE EDUCATION/TRAINING PROGRAM

## 2023-09-25 PROCEDURE — 99215 OFFICE O/P EST HI 40 MIN: CPT | Mod: PBBFAC,25 | Performed by: PHYSICIAN ASSISTANT

## 2023-09-25 PROCEDURE — 3078F DIAST BP <80 MM HG: CPT | Mod: S$GLB,,, | Performed by: PHYSICIAN ASSISTANT

## 2023-09-25 PROCEDURE — 3074F SYST BP LT 130 MM HG: CPT | Mod: S$GLB,,, | Performed by: PHYSICIAN ASSISTANT

## 2023-09-25 PROCEDURE — 3078F PR MOST RECENT DIASTOLIC BLOOD PRESSURE < 80 MM HG: ICD-10-PCS | Mod: S$GLB,,, | Performed by: PHYSICIAN ASSISTANT

## 2023-09-25 PROCEDURE — 3008F BODY MASS INDEX DOCD: CPT | Mod: S$GLB,,, | Performed by: PHYSICIAN ASSISTANT

## 2023-09-25 PROCEDURE — 3008F PR BODY MASS INDEX (BMI) DOCUMENTED: ICD-10-PCS | Mod: S$GLB,,, | Performed by: PHYSICIAN ASSISTANT

## 2023-09-25 PROCEDURE — 99214 PR OFFICE/OUTPT VISIT, EST, LEVL IV, 30-39 MIN: ICD-10-PCS | Mod: S$GLB,,, | Performed by: PHYSICIAN ASSISTANT

## 2023-09-25 PROCEDURE — 99214 OFFICE O/P EST MOD 30 MIN: CPT | Mod: S$GLB,,, | Performed by: PHYSICIAN ASSISTANT

## 2023-09-25 PROCEDURE — 3074F PR MOST RECENT SYSTOLIC BLOOD PRESSURE < 130 MM HG: ICD-10-PCS | Mod: S$GLB,,, | Performed by: PHYSICIAN ASSISTANT

## 2023-09-25 PROCEDURE — 72148 MRI LUMBAR SPINE W/O DYE: CPT | Mod: 26,,, | Performed by: STUDENT IN AN ORGANIZED HEALTH CARE EDUCATION/TRAINING PROGRAM

## 2023-09-25 PROCEDURE — 1159F MED LIST DOCD IN RCRD: CPT | Mod: S$GLB,,, | Performed by: PHYSICIAN ASSISTANT

## 2023-09-25 RX ORDER — CELECOXIB 200 MG/1
200 CAPSULE ORAL DAILY
Qty: 30 CAPSULE | Refills: 0 | Status: SHIPPED | OUTPATIENT
Start: 2023-09-25 | End: 2023-10-25

## 2023-09-25 RX ORDER — GABAPENTIN 300 MG/1
300 CAPSULE ORAL 3 TIMES DAILY
Qty: 90 CAPSULE | Refills: 0 | Status: SHIPPED | OUTPATIENT
Start: 2023-09-25 | End: 2023-10-25

## 2023-09-25 RX ORDER — ACETAMINOPHEN AND CODEINE PHOSPHATE 300; 30 MG/1; MG/1
1 TABLET ORAL EVERY 12 HOURS PRN
Qty: 60 TABLET | Refills: 0 | Status: SHIPPED | OUTPATIENT
Start: 2023-09-25 | End: 2023-10-25

## 2023-09-25 NOTE — PATIENT INSTRUCTIONS

## 2023-10-12 ENCOUNTER — TELEPHONE (OUTPATIENT)
Dept: INTERNAL MEDICINE | Facility: CLINIC | Age: 53
End: 2023-10-12
Payer: COMMERCIAL

## 2023-10-12 ENCOUNTER — TELEPHONE (OUTPATIENT)
Dept: PAIN MEDICINE | Facility: CLINIC | Age: 53
End: 2023-10-12
Payer: COMMERCIAL

## 2023-10-12 ENCOUNTER — OFFICE VISIT (OUTPATIENT)
Dept: FAMILY MEDICINE | Facility: CLINIC | Age: 53
End: 2023-10-12
Payer: COMMERCIAL

## 2023-10-12 VITALS
TEMPERATURE: 98 F | OXYGEN SATURATION: 96 % | HEART RATE: 108 BPM | SYSTOLIC BLOOD PRESSURE: 114 MMHG | DIASTOLIC BLOOD PRESSURE: 70 MMHG | WEIGHT: 182 LBS | RESPIRATION RATE: 20 BRPM | HEIGHT: 67 IN | BODY MASS INDEX: 28.56 KG/M2

## 2023-10-12 DIAGNOSIS — R05.1 ACUTE COUGH: ICD-10-CM

## 2023-10-12 DIAGNOSIS — J01.90 ACUTE NON-RECURRENT SINUSITIS, UNSPECIFIED LOCATION: Primary | ICD-10-CM

## 2023-10-12 PROCEDURE — 96372 PR INJECTION,THERAP/PROPH/DIAG2ST, IM OR SUBCUT: ICD-10-PCS | Mod: ,,, | Performed by: NURSE PRACTITIONER

## 2023-10-12 PROCEDURE — 99213 OFFICE O/P EST LOW 20 MIN: CPT | Mod: 25,,, | Performed by: NURSE PRACTITIONER

## 2023-10-12 PROCEDURE — 3008F PR BODY MASS INDEX (BMI) DOCUMENTED: ICD-10-PCS | Mod: ,,, | Performed by: NURSE PRACTITIONER

## 2023-10-12 PROCEDURE — 3078F DIAST BP <80 MM HG: CPT | Mod: ,,, | Performed by: NURSE PRACTITIONER

## 2023-10-12 PROCEDURE — 99213 PR OFFICE/OUTPT VISIT, EST, LEVL III, 20-29 MIN: ICD-10-PCS | Mod: 25,,, | Performed by: NURSE PRACTITIONER

## 2023-10-12 PROCEDURE — 3074F SYST BP LT 130 MM HG: CPT | Mod: ,,, | Performed by: NURSE PRACTITIONER

## 2023-10-12 PROCEDURE — 3074F PR MOST RECENT SYSTOLIC BLOOD PRESSURE < 130 MM HG: ICD-10-PCS | Mod: ,,, | Performed by: NURSE PRACTITIONER

## 2023-10-12 PROCEDURE — 3008F BODY MASS INDEX DOCD: CPT | Mod: ,,, | Performed by: NURSE PRACTITIONER

## 2023-10-12 PROCEDURE — 3078F PR MOST RECENT DIASTOLIC BLOOD PRESSURE < 80 MM HG: ICD-10-PCS | Mod: ,,, | Performed by: NURSE PRACTITIONER

## 2023-10-12 PROCEDURE — 96372 THER/PROPH/DIAG INJ SC/IM: CPT | Mod: ,,, | Performed by: NURSE PRACTITIONER

## 2023-10-12 RX ORDER — AMOXICILLIN AND CLAVULANATE POTASSIUM 875; 125 MG/1; MG/1
1 TABLET, FILM COATED ORAL 2 TIMES DAILY
Qty: 20 TABLET | Refills: 0 | Status: SHIPPED | OUTPATIENT
Start: 2023-10-12 | End: 2023-10-22

## 2023-10-12 RX ORDER — CHLORPHENIRAMINE MALEATE AND DEXTROMETHORPHAN HYDROBROMIDE 4; 30 MG/1; MG/1
1 TABLET, FILM COATED ORAL EVERY 6 HOURS
Qty: 20 EACH | Refills: 1 | Status: SHIPPED | OUTPATIENT
Start: 2023-10-12 | End: 2023-10-22

## 2023-10-12 RX ORDER — FLUCONAZOLE 150 MG/1
150 TABLET ORAL
Qty: 2 TABLET | Refills: 0 | Status: SHIPPED | OUTPATIENT
Start: 2023-10-12 | End: 2023-10-20

## 2023-10-12 RX ORDER — CEFTRIAXONE 1 G/1
1 INJECTION, POWDER, FOR SOLUTION INTRAMUSCULAR; INTRAVENOUS
Status: COMPLETED | OUTPATIENT
Start: 2023-10-12 | End: 2023-10-12

## 2023-10-12 RX ORDER — PROMETHAZINE HYDROCHLORIDE AND DEXTROMETHORPHAN HYDROBROMIDE 6.25; 15 MG/5ML; MG/5ML
5 SYRUP ORAL EVERY 6 HOURS PRN
Qty: 240 ML | Refills: 0 | Status: SHIPPED | OUTPATIENT
Start: 2023-10-12 | End: 2023-10-22

## 2023-10-12 RX ORDER — DEXAMETHASONE SODIUM PHOSPHATE 4 MG/ML
6 INJECTION, SOLUTION INTRA-ARTICULAR; INTRALESIONAL; INTRAMUSCULAR; INTRAVENOUS; SOFT TISSUE ONCE
Status: COMPLETED | OUTPATIENT
Start: 2023-10-12 | End: 2023-10-12

## 2023-10-12 RX ADMIN — CEFTRIAXONE 1 G: 1 INJECTION, POWDER, FOR SOLUTION INTRAMUSCULAR; INTRAVENOUS at 03:10

## 2023-10-12 RX ADMIN — DEXAMETHASONE SODIUM PHOSPHATE 6 MG: 4 INJECTION, SOLUTION INTRA-ARTICULAR; INTRALESIONAL; INTRAMUSCULAR; INTRAVENOUS; SOFT TISSUE at 03:10

## 2023-10-12 NOTE — PROGRESS NOTES
Subjective:       Patient ID: Adele Solares is a 53 y.o. female.    Chief Complaint: Sinus Problem (Sinus congestion, runny nose) and Otalgia (Right ear discomfort)    Sinus congestion  Review of Systems   Constitutional:  Negative for appetite change, fatigue and fever.   HENT:  Positive for nasal congestion and sinus pressure/congestion. Negative for ear pain and sore throat.    Eyes:  Negative for pain, discharge and itching.   Respiratory:  Negative for cough and shortness of breath.    Cardiovascular:  Negative for chest pain and leg swelling.   Gastrointestinal:  Negative for abdominal pain, change in bowel habit, nausea and vomiting.   Musculoskeletal:  Negative for back pain, gait problem and neck pain.   Integumentary:  Negative for rash and wound.   Allergic/Immunologic: Negative for immunocompromised state.   Neurological:  Negative for dizziness, weakness and headaches.   All other systems reviewed and are negative.        Objective:      Physical Exam  Vitals and nursing note reviewed.   Constitutional:       General: She is not in acute distress.     Appearance: Normal appearance. She is not ill-appearing, toxic-appearing or diaphoretic.   HENT:      Head: Normocephalic.      Right Ear: Ear canal and external ear normal. Tympanic membrane is injected.      Left Ear: Tympanic membrane, ear canal and external ear normal.      Ears:      Comments: Right preauricular TTP and enlargement      Nose: Mucosal edema and congestion present. No rhinorrhea.      Right Turbinates: Swollen.      Left Turbinates: Swollen.      Right Sinus: Maxillary sinus tenderness and frontal sinus tenderness present.      Left Sinus: Maxillary sinus tenderness and frontal sinus tenderness present.      Mouth/Throat:      Mouth: Mucous membranes are moist.      Pharynx: Posterior oropharyngeal erythema present. No oropharyngeal exudate.   Eyes:      General: No scleral icterus.        Right eye: No discharge.         Left eye:  No discharge.      Extraocular Movements: Extraocular movements intact.      Conjunctiva/sclera: Conjunctivae normal.      Pupils: Pupils are equal, round, and reactive to light.   Cardiovascular:      Rate and Rhythm: Normal rate and regular rhythm.      Pulses: Normal pulses.      Heart sounds: Normal heart sounds. No murmur heard.  Pulmonary:      Effort: Pulmonary effort is normal. No respiratory distress.      Breath sounds: No wheezing, rhonchi or rales.      Comments: Coarse breath sounds with a congested cough  Musculoskeletal:         General: Normal range of motion.      Cervical back: Neck supple. No tenderness.   Lymphadenopathy:      Cervical: No cervical adenopathy.   Skin:     General: Skin is warm and dry.      Capillary Refill: Capillary refill takes less than 2 seconds.      Findings: No rash.   Neurological:      Mental Status: She is alert and oriented to person, place, and time.   Psychiatric:         Mood and Affect: Mood normal.         Behavior: Behavior normal.         Thought Content: Thought content normal.         Judgment: Judgment normal.          Assessment:       1. Acute non-recurrent sinusitis, unspecified location    2. Acute cough        Plan:   Acute non-recurrent sinusitis, unspecified location  -     dexAMETHasone injection 6 mg  -     cefTRIAXone injection 1 g  -     amoxicillin-clavulanate 875-125mg (AUGMENTIN) 875-125 mg per tablet; Take 1 tablet by mouth 2 (two) times daily. for 20 doses  Dispense: 20 tablet; Refill: 0  -     chlorpheniramine-dextromethorp (CORICIDIN HBP COUGH AND COLD) 4-30 mg Tab; Take 1 tablet by mouth every 6 (six) hours. for 10 days  Dispense: 20 each; Refill: 1    Acute cough  -     chlorpheniramine-dextromethorp (CORICIDIN HBP COUGH AND COLD) 4-30 mg Tab; Take 1 tablet by mouth every 6 (six) hours. for 10 days  Dispense: 20 each; Refill: 1  -     promethazine-dextromethorphan (PROMETHAZINE-DM) 6.25-15 mg/5 mL Syrp; Take 5 mLs by mouth every 6 (six)  hours as needed (cough).  Dispense: 240 mL; Refill: 0    Other orders  -     fluconazole (DIFLUCAN) 150 MG Tab; Take 1 tablet (150 mg total) by mouth every 7 days. for 2 doses  Dispense: 2 tablet; Refill: 0           Risks, benefits, and side effects were discussed with the patient. All questions were answered to the fullest satisfaction of the patient, and pt verbalized understanding and agreement to treatment plan. Pt was to call with any new or worsening symptoms, or present to the ER

## 2023-10-12 NOTE — TELEPHONE ENCOUNTER
----- Message from Katya Roque sent at 10/12/2023  8:43 AM CDT -----  PATIENT NEED YOU TO GIVE HER A CALL. SHE HAVE A sinus infection AND SHE NEED SOME ANTIBIOTICS. PLEASE GIVE HER A CALL.

## 2023-10-12 NOTE — LETTER
October 12, 2023      [facility]  [address] [facility] [place] [address]  Phone: [phone]  Fax: [phone]       Patient: Adele Solares   YOB: 1970  Date of Visit: 10/12/2023    To Whom It May Concern:    Twin Solares  was at [facility] on 10/12/2023. The patient may return to work/school on 10/14/2023 with no restrictions. If you have any questions or concerns, or if I can be of further assistance, please do not hesitate to contact me.    Sincerely,    BUCK Martinez

## 2023-10-12 NOTE — TELEPHONE ENCOUNTER
Procedure- Bilateral L4-S1 MBB is put on hold due to patient having sinus symptoms and cough.  Patient is instructed to see PCP.

## 2023-11-30 ENCOUNTER — OFFICE VISIT (OUTPATIENT)
Dept: INTERNAL MEDICINE | Facility: CLINIC | Age: 53
End: 2023-11-30
Payer: COMMERCIAL

## 2023-11-30 VITALS
RESPIRATION RATE: 18 BRPM | HEIGHT: 67 IN | WEIGHT: 188 LBS | BODY MASS INDEX: 29.51 KG/M2 | OXYGEN SATURATION: 99 % | SYSTOLIC BLOOD PRESSURE: 116 MMHG | TEMPERATURE: 98 F | DIASTOLIC BLOOD PRESSURE: 78 MMHG | HEART RATE: 82 BPM

## 2023-11-30 DIAGNOSIS — I10 ESSENTIAL HYPERTENSION: ICD-10-CM

## 2023-11-30 DIAGNOSIS — Z09 FOLLOW-UP EXAM: Primary | ICD-10-CM

## 2023-11-30 DIAGNOSIS — K21.9 GASTROESOPHAGEAL REFLUX DISEASE, UNSPECIFIED WHETHER ESOPHAGITIS PRESENT: ICD-10-CM

## 2023-11-30 DIAGNOSIS — E03.9 HYPOTHYROIDISM, UNSPECIFIED TYPE: ICD-10-CM

## 2023-11-30 DIAGNOSIS — F17.200 TOBACCO DEPENDENCE: ICD-10-CM

## 2023-11-30 PROCEDURE — 3078F PR MOST RECENT DIASTOLIC BLOOD PRESSURE < 80 MM HG: ICD-10-PCS | Mod: S$GLB,,, | Performed by: INTERNAL MEDICINE

## 2023-11-30 PROCEDURE — 99214 OFFICE O/P EST MOD 30 MIN: CPT | Mod: PBBFAC | Performed by: INTERNAL MEDICINE

## 2023-11-30 PROCEDURE — 96372 PR INJECTION,THERAP/PROPH/DIAG2ST, IM OR SUBCUT: ICD-10-PCS | Mod: S$GLB,,, | Performed by: INTERNAL MEDICINE

## 2023-11-30 PROCEDURE — 99396 PR PREVENTIVE VISIT,EST,40-64: ICD-10-PCS | Mod: 25,S$GLB,, | Performed by: INTERNAL MEDICINE

## 2023-11-30 PROCEDURE — 99396 PREV VISIT EST AGE 40-64: CPT | Mod: 25,S$GLB,, | Performed by: INTERNAL MEDICINE

## 2023-11-30 PROCEDURE — 1159F MED LIST DOCD IN RCRD: CPT | Mod: S$GLB,,, | Performed by: INTERNAL MEDICINE

## 2023-11-30 PROCEDURE — 1159F PR MEDICATION LIST DOCUMENTED IN MEDICAL RECORD: ICD-10-PCS | Mod: S$GLB,,, | Performed by: INTERNAL MEDICINE

## 2023-11-30 PROCEDURE — 3074F SYST BP LT 130 MM HG: CPT | Mod: S$GLB,,, | Performed by: INTERNAL MEDICINE

## 2023-11-30 PROCEDURE — 3078F DIAST BP <80 MM HG: CPT | Mod: S$GLB,,, | Performed by: INTERNAL MEDICINE

## 2023-11-30 PROCEDURE — 3008F PR BODY MASS INDEX (BMI) DOCUMENTED: ICD-10-PCS | Mod: S$GLB,,, | Performed by: INTERNAL MEDICINE

## 2023-11-30 PROCEDURE — 3074F PR MOST RECENT SYSTOLIC BLOOD PRESSURE < 130 MM HG: ICD-10-PCS | Mod: S$GLB,,, | Performed by: INTERNAL MEDICINE

## 2023-11-30 PROCEDURE — 3008F BODY MASS INDEX DOCD: CPT | Mod: S$GLB,,, | Performed by: INTERNAL MEDICINE

## 2023-11-30 PROCEDURE — 96372 THER/PROPH/DIAG INJ SC/IM: CPT | Mod: S$GLB,,, | Performed by: INTERNAL MEDICINE

## 2023-11-30 RX ORDER — LEVOTHYROXINE SODIUM 100 UG/1
100 TABLET ORAL
Qty: 90 TABLET | Refills: 3 | Status: SHIPPED | OUTPATIENT
Start: 2023-11-30

## 2023-11-30 RX ORDER — IBUPROFEN 800 MG/1
800 TABLET ORAL 3 TIMES DAILY
Qty: 30 TABLET | Refills: 1 | Status: SHIPPED | OUTPATIENT
Start: 2023-11-30

## 2023-11-30 RX ORDER — GABAPENTIN 100 MG/1
100 CAPSULE ORAL 3 TIMES DAILY
Qty: 90 CAPSULE | Refills: 3 | Status: SHIPPED | OUTPATIENT
Start: 2023-11-30 | End: 2024-11-29

## 2023-11-30 RX ORDER — OMEPRAZOLE 40 MG/1
40 CAPSULE, DELAYED RELEASE ORAL DAILY
Qty: 90 CAPSULE | Refills: 3 | Status: SHIPPED | OUTPATIENT
Start: 2023-11-30

## 2023-11-30 RX ORDER — KETOROLAC TROMETHAMINE 30 MG/ML
60 INJECTION, SOLUTION INTRAMUSCULAR; INTRAVENOUS ONCE
Status: COMPLETED | OUTPATIENT
Start: 2023-11-30 | End: 2023-11-30

## 2023-11-30 RX ADMIN — KETOROLAC TROMETHAMINE 60 MG: 30 INJECTION, SOLUTION INTRAMUSCULAR; INTRAVENOUS at 08:11

## 2023-11-30 NOTE — PROGRESS NOTES
"Subjective:       Patient ID: Adele Solares is a 53 y.o. female.    Chief Complaint: Follow-up (Having left shoulder pain. )    The patient is a 51 yo female that presents today to establish care. She has history of hypothyroidism, hypertension, gerd, and osteoarthritis. She is up to date on colonoscopy and mammogram. She complains of some left neck pain and left shoulder pain. This has been ongoing for "awhile". Made worse with certain movements. She denies any known injury. Some radiation from neck to left arm. No numbness or tingling. She does continue to smoke about 1 pack per day. We have discussed cessation. She did quit one time with the help of chantix for 3 months but started back. Today she is resting comfortably in no distress. She does complain of some reflux at times. She is currently afebrile and vital signs are stable.    5/30/23-the patient presents today for follow-up.  She continues to complain of neck pain.  She also now complains of some numbness and tingling in her bilateral hands.  She gets some relief with ibuprofen but not a lot.  No weakness and no change in strength.  The Toradol injection does help for a little while.  Otherwise she is doing well.  Blood pressure looks good today.  It is 118/84.  She does need some refills on her medications.  She continues to smoke.  She is in contemplation stage when it comes to cessation.    11/30/23-the patient presents today for follow-up/wellness visit.  Since we last saw her she did get to see pain management.  They set her up for some injections but she was sick at the time it was scheduled so it was canceled.  She has not been able to reschedule this yet.  She still has some pain at times.  Otherwise she is doing okay.  Blood pressure 116/78.  She continues to smoke.  She states that she is working towards trying to quit.  She is up-to-date on age-appropriate screenings.  She is resting comfortably today in no distress.  She is afebrile and " vital signs are stable.    Follow-up  Associated symptoms include neck pain. Pertinent negatives include no abdominal pain, arthralgias, chest pain, chills, congestion, coughing, fatigue, fever, joint swelling, myalgias, nausea, rash, sore throat, vomiting or weakness.   Back Pain  Pertinent negatives include no abdominal pain, chest pain, dysuria, fever or weakness.   Neck Pain   Pertinent negatives include no chest pain, fever, trouble swallowing or weakness.   Thyroid Problem  Patient reports no anxiety, cold intolerance, constipation, diarrhea, fatigue, heat intolerance, menstrual problem, palpitations or tremors.     Review of Systems   Constitutional:  Negative for activity change, appetite change, chills, fatigue, fever and unexpected weight change.   HENT:  Negative for nasal congestion, ear pain, hearing loss, rhinorrhea, sinus pressure/congestion, sore throat and trouble swallowing.    Eyes:  Negative for pain, discharge, redness and visual disturbance.   Respiratory:  Negative for apnea, cough, chest tightness, shortness of breath and wheezing.    Cardiovascular:  Negative for chest pain and palpitations.   Gastrointestinal:  Negative for abdominal pain, blood in stool, constipation, diarrhea, nausea, vomiting and reflux.   Endocrine: Negative for cold intolerance, heat intolerance, polydipsia and polyuria.   Genitourinary:  Negative for difficulty urinating, dysuria, hematuria and menstrual problem.   Musculoskeletal:  Positive for back pain and neck pain. Negative for arthralgias, joint swelling and myalgias.   Integumentary:  Negative for pallor, rash and wound.   Allergic/Immunologic: Negative for immunocompromised state.   Neurological:  Negative for tremors, seizures, weakness and memory loss.   Hematological:  Negative for adenopathy.   Psychiatric/Behavioral:  Negative for confusion, dysphoric mood and sleep disturbance. The patient is not nervous/anxious.          Objective:      Physical  Exam  Vitals and nursing note reviewed.   Constitutional:       General: She is not in acute distress.     Appearance: Normal appearance. She is not ill-appearing.   HENT:      Head: Normocephalic and atraumatic.      Right Ear: External ear normal.      Left Ear: External ear normal.      Nose: Nose normal.      Mouth/Throat:      Pharynx: Oropharynx is clear.   Eyes:      Extraocular Movements: Extraocular movements intact.      Conjunctiva/sclera: Conjunctivae normal.      Pupils: Pupils are equal, round, and reactive to light.   Neck:      Vascular: No carotid bruit.   Cardiovascular:      Rate and Rhythm: Normal rate and regular rhythm.      Pulses: Normal pulses.      Heart sounds: Normal heart sounds. No murmur heard.  Pulmonary:      Effort: No respiratory distress.      Breath sounds: Normal breath sounds. No wheezing or rales.   Abdominal:      General: Bowel sounds are normal.      Palpations: Abdomen is soft.   Musculoskeletal:         General: Tenderness present. Normal range of motion.      Cervical back: Normal range of motion and neck supple.      Right lower leg: No edema.      Left lower leg: No edema.      Comments: Tenderness over muscles of left posterior neck       Skin:     General: Skin is warm and dry.      Capillary Refill: Capillary refill takes less than 2 seconds.      Coloration: Skin is not pale.   Neurological:      General: No focal deficit present.      Mental Status: She is alert and oriented to person, place, and time.      Cranial Nerves: No cranial nerve deficit.      Sensory: No sensory deficit.      Motor: No weakness.      Gait: Gait normal.   Psychiatric:         Mood and Affect: Mood normal.         Judgment: Judgment normal.         Assessment:       Problem List Items Addressed This Visit          Cardiac/Vascular    Essential hypertension    Relevant Orders    CBC Auto Differential    Comprehensive Metabolic Panel    Lipid Panel    Hemoglobin A1C       Endocrine     Hypothyroidism    Relevant Orders    TSH    T4, Free       GI    Gastroesophageal reflux disease       Other    Tobacco dependence     Other Visit Diagnoses       Follow-up exam    -  Primary              Plan:       The patient presents today for follow-up/wellness visit.  She is up to date on mammogram and colonoscopy.  Colonoscopy was done in 2022 with repeat in 5 years.  We are going to check some lab work today    2.   Essential hypertension- blood pressure is 116/78   She is on losartan-hctz 50/12.5.     3.   Hypothyroidism- she is on levothyroxine 100 mcg daily.  We will refill her Synthroid today.  Also check a TSH and a free T4    4.   GERD- still having some issues. We are going to increase omeprazole to 40 mg daily. 30 minutes prior to first meal  Increasing to 40 mg has helped.  We will continue with the 40 mg dose  Stable with no acute issues    5.   Arthralgias- neck and left shoulder. We will image neck today. Toradol and steroid injection. Mobic prn. Also robaxin prn  11/29-she is still having some issues.  X-ray with no acute process.  We will continue with NSAIDs and Robaxin.  Refer to physical therapy  5/30/23-she is still having some issues.  Plain x-ray showed some arthritic changes.  She is now having some numbness and tingling as well.  We are going to set her up for an MRI of her cervical spine and refer her to pain management.  Toradol injection today.  I have also started gabapentin.  11/30/23-she was able to get in with pain management they set her up for some injections but they had to be postponed and his she has not set this back up yet    6.   Tobacco dependence- counseled on cessation. She is in contemplation stage. She quit before for 3 months using chantix      RTC 6 months

## 2024-01-15 PROBLEM — J01.90 ACUTE NON-RECURRENT SINUSITIS: Status: RESOLVED | Noted: 2023-10-12 | Resolved: 2024-01-15

## 2024-04-02 ENCOUNTER — OFFICE VISIT (OUTPATIENT)
Dept: OBSTETRICS AND GYNECOLOGY | Facility: CLINIC | Age: 54
End: 2024-04-02
Payer: COMMERCIAL

## 2024-04-02 VITALS
SYSTOLIC BLOOD PRESSURE: 119 MMHG | BODY MASS INDEX: 28.66 KG/M2 | HEART RATE: 79 BPM | DIASTOLIC BLOOD PRESSURE: 85 MMHG | OXYGEN SATURATION: 97 % | WEIGHT: 182.63 LBS | RESPIRATION RATE: 18 BRPM | HEIGHT: 67 IN

## 2024-04-02 DIAGNOSIS — N94.19 DYSPAREUNIA DUE TO MEDICAL CONDITION IN FEMALE: ICD-10-CM

## 2024-04-02 DIAGNOSIS — E03.9 HYPOTHYROIDISM, UNSPECIFIED TYPE: ICD-10-CM

## 2024-04-02 DIAGNOSIS — Z86.010 HISTORY OF COLON POLYPS: ICD-10-CM

## 2024-04-02 DIAGNOSIS — N95.2 VAGINITIS, ATROPHIC: Primary | ICD-10-CM

## 2024-04-02 DIAGNOSIS — I10 ESSENTIAL HYPERTENSION: ICD-10-CM

## 2024-04-02 DIAGNOSIS — N95.1 HOT FLASHES DUE TO MENOPAUSE: ICD-10-CM

## 2024-04-02 DIAGNOSIS — E55.9 VITAMIN D DEFICIENCY: ICD-10-CM

## 2024-04-02 DIAGNOSIS — K21.9 GASTROESOPHAGEAL REFLUX DISEASE, UNSPECIFIED WHETHER ESOPHAGITIS PRESENT: ICD-10-CM

## 2024-04-02 DIAGNOSIS — F17.200 TOBACCO DEPENDENCE: ICD-10-CM

## 2024-04-02 LAB
BILIRUB UR QL STRIP: NEGATIVE
CLARITY UR: CLEAR
COLOR UR: ABNORMAL
CTP QC/QA: YES
FECAL OCCULT BLOOD, POC: NEGATIVE
GLUCOSE UR STRIP-MCNC: NORMAL MG/DL
KETONES UR STRIP-SCNC: NEGATIVE MG/DL
LEUKOCYTE ESTERASE UR QL STRIP: NEGATIVE
MUCOUS, UA: ABNORMAL /LPF
NITRITE UR QL STRIP: NEGATIVE
PH UR STRIP: 8 PH UNITS
PROT UR QL STRIP: NEGATIVE
RBC # UR STRIP: NEGATIVE /UL
RBC #/AREA URNS HPF: 2 /HPF
SP GR UR STRIP: 1.02
SQUAMOUS #/AREA URNS LPF: ABNORMAL /HPF
UROBILINOGEN UR STRIP-ACNC: 2 MG/DL
WBC #/AREA URNS HPF: <1 /HPF

## 2024-04-02 PROCEDURE — 99214 OFFICE O/P EST MOD 30 MIN: CPT | Mod: PBBFAC | Performed by: OBSTETRICS & GYNECOLOGY

## 2024-04-02 PROCEDURE — 87591 N.GONORRHOEAE DNA AMP PROB: CPT | Mod: GZ,,, | Performed by: CLINICAL MEDICAL LABORATORY

## 2024-04-02 PROCEDURE — 82270 OCCULT BLOOD FECES: CPT | Mod: PBBFAC,91 | Performed by: OBSTETRICS & GYNECOLOGY

## 2024-04-02 PROCEDURE — 1160F RVW MEDS BY RX/DR IN RCRD: CPT | Mod: CPTII,,, | Performed by: OBSTETRICS & GYNECOLOGY

## 2024-04-02 PROCEDURE — 99459 PELVIC EXAMINATION: CPT | Mod: S$PBB,,, | Performed by: OBSTETRICS & GYNECOLOGY

## 2024-04-02 PROCEDURE — 87491 CHLMYD TRACH DNA AMP PROBE: CPT | Mod: GZ,,, | Performed by: CLINICAL MEDICAL LABORATORY

## 2024-04-02 PROCEDURE — 99999PBSHW POCT OCCULT BLOOD STOOL: Mod: PBBFAC,,,

## 2024-04-02 PROCEDURE — 3008F BODY MASS INDEX DOCD: CPT | Mod: CPTII,,, | Performed by: OBSTETRICS & GYNECOLOGY

## 2024-04-02 PROCEDURE — 99999PBSHW PR PBB SHADOW TECHNICAL ONLY FILED TO HB: Mod: PBBFAC,,,

## 2024-04-02 PROCEDURE — 99459 PELVIC EXAMINATION: CPT | Mod: PBBFAC | Performed by: OBSTETRICS & GYNECOLOGY

## 2024-04-02 PROCEDURE — 88142 CYTOPATH C/V THIN LAYER: CPT | Mod: TC,GCY | Performed by: OBSTETRICS & GYNECOLOGY

## 2024-04-02 PROCEDURE — 82270 OCCULT BLOOD FECES: CPT | Mod: PBBFAC | Performed by: OBSTETRICS & GYNECOLOGY

## 2024-04-02 PROCEDURE — 81001 URINALYSIS AUTO W/SCOPE: CPT | Mod: ,,, | Performed by: CLINICAL MEDICAL LABORATORY

## 2024-04-02 PROCEDURE — 1159F MED LIST DOCD IN RCRD: CPT | Mod: CPTII,,, | Performed by: OBSTETRICS & GYNECOLOGY

## 2024-04-02 PROCEDURE — 87624 HPV HI-RISK TYP POOLED RSLT: CPT | Mod: ,,, | Performed by: CLINICAL MEDICAL LABORATORY

## 2024-04-02 PROCEDURE — 99205 OFFICE O/P NEW HI 60 MIN: CPT | Mod: S$PBB,,, | Performed by: OBSTETRICS & GYNECOLOGY

## 2024-04-02 PROCEDURE — 3074F SYST BP LT 130 MM HG: CPT | Mod: CPTII,,, | Performed by: OBSTETRICS & GYNECOLOGY

## 2024-04-02 PROCEDURE — 3079F DIAST BP 80-89 MM HG: CPT | Mod: CPTII,,, | Performed by: OBSTETRICS & GYNECOLOGY

## 2024-04-02 NOTE — PROGRESS NOTES
"Adele Solares female  for   Chief Complaint   Patient presents with    Annual Exam     Patient is here for an annual exam, as per patient she is also having some white discharge with no odor or discomfort       OB History          2    Para   2    Term   2            AB        Living             SAB        IAB        Ectopic        Multiple        Live Births                      PHI:  Patient is 53 years of age  2, para 2 Afro-American female presents for an annual examination with a few complaints.  It has been several years since she has been evaluated by Dr. Yates.  Patient's gynecologic history includes the robotic hysterectomy with a unilateral salpingo oophorectomy.    Patient was sexually active and occasionally has some vaginal dryness.  She describes occasional nocturnal hot flash.  She uses no vitamin-D supplementation.    Patient was last recorded mammogram was performed on 2023 and was recorded as a bi rad 1.     Past Medical History:   Diagnosis Date    Hypertension     Thyroid disease       Past Surgical History:   Procedure Laterality Date    Robotic assisted vaginal hysterectomy (DVH) with a unilateral salpingo oophorectomy      TUBAL LIGATION        Review of patient's allergies indicates:  No Known Allergies     ROS:Pertinent items are noted in HPI.    Physical exam:This gyn related exam was chaperoned by a female medical assistant.      /85 (BP Location: Left arm, Patient Position: Sitting)   Pulse 79   Resp 18   Ht 5' 7" (1.702 m)   Wt 82.8 kg (182 lb 9.6 oz)   SpO2 97%   BMI 28.60 kg/m²      General Appearance: healthy, alert, no distress, smiling, minimally overweight               HEENT: Head: Normocephalic, no lesions, without obvious abnormality.    Neuro: normal exam    Lymphatic: no palpable lymphadenopathy, no hepatosplenomegaly    Chest:            Breast: normal appearance, no masses or tenderness, Inspection negative, No nipple retraction or " dimpling, No nipple discharge or bleeding, No axillary or supraclavicular adenopathy, Normal to palpation without dominant masses            Lung: chest clear, no wheezing, rales, normal symmetric air entry            Heart: regular rate and rhythm, S1, S2 normal, no murmur, click, rub or gallop    Abdomen: soft, non-tender, without masses or organomegaly, normal bowel sounds, without guarding, without rebound, and well-healed robotic surgical scars; minimal abdominal obesity; no evidence of any incisional or ventral hernia; no ascites; no abdominal bruit on auscultation of the abdomen.    Pelvic:            Vulva: Normal vulva: no lesions, masses, epithelial changes, or exudate           Vagina: normal mucosa, no discharge, atrophic, no evidence of pelvic relaxation; no evidence of any urinary incontinence on coughing or straining; no cystocele, no urethrocele no rectocele           Uterus: Uterus / cervix surgically absent           Adnexae: no mass, fullness, tenderness and unable to palpate adnexa on left or right side    Rectal: negative, stool guaiac negative    Extremity: normal, extremities warm, no clubbing, no cyanosis, no edema, non-tender; no CVA tenderness bilaterally    Skin: normal exam        Assessment:   Problem List Items Addressed This Visit          Cardiac/Vascular    Essential hypertension    Relevant Orders    ThinPrep Pap Test    POCT occult blood stool    Comprehensive Metabolic Panel    CBC Auto Differential    Hemoglobin A1C    Vitamin D    Urinalysis, Reflex to Urine Culture    Estradiol    Follicle Stimulating Hormone    Luteinizing Hormone    Progesterone       Endocrine    Hypothyroidism    Relevant Orders    Estradiol    Follicle Stimulating Hormone    Luteinizing Hormone    Progesterone       GI    Gastroesophageal reflux disease    Relevant Orders    ThinPrep Pap Test    POCT occult blood stool    Comprehensive Metabolic Panel    CBC Auto Differential    Hemoglobin A1C    Vitamin D     Urinalysis, Reflex to Urine Culture    Estradiol    Follicle Stimulating Hormone    Luteinizing Hormone    Progesterone    History of colon polyps       Other    Tobacco dependence     Other Visit Diagnoses       Vaginitis, atrophic    -  Primary    Relevant Orders    ThinPrep Pap Test    POCT occult blood stool    Comprehensive Metabolic Panel    CBC Auto Differential    Hemoglobin A1C    Vitamin D    Urinalysis, Reflex to Urine Culture    Estradiol    Follicle Stimulating Hormone    Luteinizing Hormone    Progesterone    Dyspareunia due to medical condition in female        Very minimal    Relevant Orders    ThinPrep Pap Test    POCT occult blood stool    Comprehensive Metabolic Panel    CBC Auto Differential    Hemoglobin A1C    Vitamin D    Urinalysis, Reflex to Urine Culture    Estradiol    Follicle Stimulating Hormone    Luteinizing Hormone    Progesterone    Hot flashes due to menopause        ?    Relevant Orders    ThinPrep Pap Test    POCT occult blood stool    Comprehensive Metabolic Panel    CBC Auto Differential    Hemoglobin A1C    Vitamin D    Urinalysis, Reflex to Urine Culture    Estradiol    Follicle Stimulating Hormone    Luteinizing Hormone    Progesterone    Vitamin D deficiency        Relevant Orders    ThinPrep Pap Test    POCT occult blood stool    Comprehensive Metabolic Panel    CBC Auto Differential    Hemoglobin A1C    Vitamin D    Urinalysis, Reflex to Urine Culture    Estradiol    Follicle Stimulating Hormone    Luteinizing Hormone    Progesterone             Plan:  Thin prep; hemoccult screen was negative today; gonadotropins; CBC; vitamin-D check; urinalysis; hemoglobin A1c and at the patient's request, the patient does want a thyroid evaluation.             Recommend repeat mammography- the patient states she has a June 2024 mammogram already scheduled; monthly breast self-exam; recommend interval colonoscopy since she has had polyps in the past - she believes that she is required  return every 3-5 years.             Doctor Yates recommends instituting over-the-counter vitamin D3 at 4000 units daily.

## 2024-04-02 NOTE — PATIENT INSTRUCTIONS
Dr. Chico Yates thanks you for this office visit at [facility].    Diagnosis for this visit:   Problem List Items Addressed This Visit          Cardiac/Vascular    Essential hypertension       GI    Gastroesophageal reflux disease     Other Visit Diagnoses       Vaginitis, atrophic    -  Primary    Dyspareunia due to medical condition in female        Very minimal    Hot flashes due to menopause        ?    Vitamin D deficiency                 The Plan:  Thin prep; hemoccult screen was negative today; gonadotropins; CBC; vitamin-D check; urinalysis; hemoglobin A1c and at the patient's request, the patient does want a thyroid evaluation.             Recommend repeat mammography- the patient states she has a June 2024 mammogram already scheduled; monthly breast self-exam; recommend interval colonoscopy since she has had polyps in the past - she believes that she is required return every 3-5 years.             Doctor Milan recommends instituting over-the-counter vitamin D3 at 4000 units daily.      Please practice best food and exercise habits for your age.    Dr. Chico Yates recommends avoidance of smoking and illicit medications or habits.    Please call  or schedule for any change in your health.     Please keep the next scheduled appointment or call for any need to change the appointment.     Dr. Chico Yaets recommends yearly exams for good health maintenance.      Thank you    Dr. Chico Yates  04/02/2024 2:06 PM

## 2024-04-04 LAB
GH SERPL-MCNC: NORMAL NG/ML
INSULIN SERPL-ACNC: NORMAL U[IU]/ML
LAB AP CLINICAL INFORMATION: NORMAL
LAB AP GYN INTERPRETATION: NEGATIVE
LAB AP PAP DISCLAIMER COMMENTS: NORMAL
RENIN PLAS-CCNC: NORMAL NG/ML/H

## 2024-04-05 LAB
CHLAMYDIA BY PCR: NEGATIVE
N. GONORRHOEAE (GC) BY PCR: NEGATIVE

## 2024-04-08 DIAGNOSIS — N95.2 VAGINITIS, ATROPHIC: Primary | ICD-10-CM

## 2024-04-12 ENCOUNTER — TELEPHONE (OUTPATIENT)
Dept: INTERNAL MEDICINE | Facility: CLINIC | Age: 54
End: 2024-04-12
Payer: COMMERCIAL

## 2024-04-12 LAB
HPV 16: NEGATIVE
HPV 18: NEGATIVE
HPV OTHER: NEGATIVE

## 2024-05-24 RX ORDER — LOSARTAN POTASSIUM AND HYDROCHLOROTHIAZIDE 12.5; 5 MG/1; MG/1
1 TABLET ORAL
Qty: 90 TABLET | Refills: 3 | Status: SHIPPED | OUTPATIENT
Start: 2024-05-24

## 2024-05-30 ENCOUNTER — OFFICE VISIT (OUTPATIENT)
Dept: INTERNAL MEDICINE | Facility: CLINIC | Age: 54
End: 2024-05-30
Payer: COMMERCIAL

## 2024-05-30 VITALS
WEIGHT: 179.38 LBS | TEMPERATURE: 98 F | HEIGHT: 67 IN | SYSTOLIC BLOOD PRESSURE: 110 MMHG | OXYGEN SATURATION: 96 % | BODY MASS INDEX: 28.16 KG/M2 | HEART RATE: 88 BPM | DIASTOLIC BLOOD PRESSURE: 70 MMHG

## 2024-05-30 DIAGNOSIS — F17.200 TOBACCO DEPENDENCE: ICD-10-CM

## 2024-05-30 DIAGNOSIS — Z09 FOLLOW-UP EXAM: Primary | ICD-10-CM

## 2024-05-30 DIAGNOSIS — F41.9 ANXIETY: ICD-10-CM

## 2024-05-30 DIAGNOSIS — K21.9 GASTROESOPHAGEAL REFLUX DISEASE, UNSPECIFIED WHETHER ESOPHAGITIS PRESENT: ICD-10-CM

## 2024-05-30 DIAGNOSIS — I10 ESSENTIAL HYPERTENSION: ICD-10-CM

## 2024-05-30 DIAGNOSIS — E03.9 HYPOTHYROIDISM, UNSPECIFIED TYPE: ICD-10-CM

## 2024-05-30 DIAGNOSIS — M25.512 ACUTE PAIN OF LEFT SHOULDER: ICD-10-CM

## 2024-05-30 PROCEDURE — 99214 OFFICE O/P EST MOD 30 MIN: CPT | Mod: PBBFAC | Performed by: INTERNAL MEDICINE

## 2024-05-30 PROCEDURE — 99214 OFFICE O/P EST MOD 30 MIN: CPT | Mod: S$PBB,,, | Performed by: INTERNAL MEDICINE

## 2024-05-30 PROCEDURE — 3078F DIAST BP <80 MM HG: CPT | Mod: CPTII,,, | Performed by: INTERNAL MEDICINE

## 2024-05-30 PROCEDURE — 3074F SYST BP LT 130 MM HG: CPT | Mod: CPTII,,, | Performed by: INTERNAL MEDICINE

## 2024-05-30 PROCEDURE — 3044F HG A1C LEVEL LT 7.0%: CPT | Mod: CPTII,,, | Performed by: INTERNAL MEDICINE

## 2024-05-30 PROCEDURE — 3008F BODY MASS INDEX DOCD: CPT | Mod: CPTII,,, | Performed by: INTERNAL MEDICINE

## 2024-05-30 PROCEDURE — 1159F MED LIST DOCD IN RCRD: CPT | Mod: CPTII,,, | Performed by: INTERNAL MEDICINE

## 2024-05-30 RX ORDER — BUSPIRONE HYDROCHLORIDE 7.5 MG/1
7.5 TABLET ORAL 3 TIMES DAILY
Qty: 90 TABLET | Refills: 5 | Status: SHIPPED | OUTPATIENT
Start: 2024-05-30 | End: 2025-05-30

## 2024-05-30 RX ORDER — METHOCARBAMOL 750 MG/1
750 TABLET, FILM COATED ORAL 4 TIMES DAILY
Qty: 40 TABLET | Refills: 1 | Status: SHIPPED | OUTPATIENT
Start: 2024-05-30 | End: 2024-06-09

## 2024-05-30 RX ORDER — LEVOTHYROXINE SODIUM 100 UG/1
100 TABLET ORAL
Qty: 90 TABLET | Refills: 3 | Status: SHIPPED | OUTPATIENT
Start: 2024-05-30

## 2024-05-30 RX ORDER — IBUPROFEN 800 MG/1
800 TABLET ORAL 3 TIMES DAILY
Qty: 30 TABLET | Refills: 1 | Status: SHIPPED | OUTPATIENT
Start: 2024-05-30

## 2024-05-30 RX ORDER — BUTENAFINE HYDROCHLORIDE 10 MG/G
CREAM TOPICAL 2 TIMES DAILY
Qty: 30 G | Refills: 0 | Status: SHIPPED | OUTPATIENT
Start: 2024-05-30

## 2024-05-30 NOTE — PROGRESS NOTES
Subjective:       Patient ID: Adele Solares is a 53 y.o. female.    Chief Complaint: Follow-up (6m fu /Pt states that she thinks she needs to start on her anxiety meds and muscle relaxer she also states that she wants a ref to Podiatry)    The patient is a 54 yo female that presents today for follow-up. She has history of hypothyroidism, hypertension, gerd, anxiety and osteoarthritis. She is up to date on colonoscopy and has mammogram scheduled for June.  She was on BuSpar in the past for her anxiety and would like to try to get back on that.  Her pain is stable.  She was never able to follow-up for injections due to a change in insurance.  However right now she would like to hold off.  She does continue to smoke about 1/2 pack per day. We have discussed cessation. She did quit one time with the help of chantix for 3 months but started back. Today she is resting comfortably in no distress. She is currently afebrile and vital signs are stable.        Follow-up  Associated symptoms include neck pain. Pertinent negatives include no abdominal pain, arthralgias, chest pain, chills, congestion, coughing, fatigue, fever, joint swelling, myalgias, nausea, rash, sore throat, vomiting or weakness.   Back Pain  Pertinent negatives include no abdominal pain, chest pain, dysuria, fever or weakness.   Neck Pain   Pertinent negatives include no chest pain, fever, trouble swallowing or weakness.   Thyroid Problem  Patient reports no anxiety, cold intolerance, constipation, diarrhea, fatigue, heat intolerance, menstrual problem, palpitations or tremors.     Review of Systems   Constitutional:  Negative for activity change, appetite change, chills, fatigue, fever and unexpected weight change.   HENT:  Negative for nasal congestion, ear pain, hearing loss, rhinorrhea, sinus pressure/congestion, sore throat and trouble swallowing.    Eyes:  Negative for pain, discharge, redness and visual disturbance.   Respiratory:  Negative for  apnea, cough, chest tightness, shortness of breath and wheezing.    Cardiovascular:  Negative for chest pain and palpitations.   Gastrointestinal:  Negative for abdominal pain, blood in stool, constipation, diarrhea, nausea, vomiting and reflux.   Endocrine: Negative for cold intolerance, heat intolerance, polydipsia and polyuria.   Genitourinary:  Negative for difficulty urinating, dysuria, hematuria and menstrual problem.   Musculoskeletal:  Positive for back pain and neck pain. Negative for arthralgias, joint swelling and myalgias.   Integumentary:  Negative for pallor, rash and wound.   Allergic/Immunologic: Negative for immunocompromised state.   Neurological:  Negative for tremors, seizures, weakness and memory loss.   Hematological:  Negative for adenopathy.   Psychiatric/Behavioral:  Negative for confusion, dysphoric mood and sleep disturbance. The patient is not nervous/anxious.          Objective:      Physical Exam  Vitals and nursing note reviewed.   Constitutional:       General: She is not in acute distress.     Appearance: Normal appearance. She is not ill-appearing.   HENT:      Head: Normocephalic and atraumatic.      Right Ear: External ear normal.      Left Ear: External ear normal.      Nose: Nose normal.      Mouth/Throat:      Pharynx: Oropharynx is clear.   Eyes:      Extraocular Movements: Extraocular movements intact.      Conjunctiva/sclera: Conjunctivae normal.      Pupils: Pupils are equal, round, and reactive to light.   Neck:      Vascular: No carotid bruit.   Cardiovascular:      Rate and Rhythm: Normal rate and regular rhythm.      Pulses: Normal pulses.      Heart sounds: Normal heart sounds. No murmur heard.  Pulmonary:      Effort: No respiratory distress.      Breath sounds: Normal breath sounds. No wheezing or rales.   Abdominal:      General: Bowel sounds are normal.      Palpations: Abdomen is soft.   Musculoskeletal:         General: Tenderness present. Normal range of motion.       Cervical back: Normal range of motion and neck supple.      Right lower leg: No edema.      Left lower leg: No edema.      Comments: Tenderness over muscles of left posterior neck       Skin:     General: Skin is warm and dry.      Capillary Refill: Capillary refill takes less than 2 seconds.      Coloration: Skin is not pale.   Neurological:      General: No focal deficit present.      Mental Status: She is alert and oriented to person, place, and time.      Cranial Nerves: No cranial nerve deficit.      Sensory: No sensory deficit.      Motor: No weakness.      Gait: Gait normal.   Psychiatric:         Mood and Affect: Mood normal.         Judgment: Judgment normal.         Assessment:       Problem List Items Addressed This Visit          Psychiatric    Anxiety       Cardiac/Vascular    Essential hypertension       Endocrine    Hypothyroidism       GI    Gastroesophageal reflux disease       Orthopedic    Acute pain of left shoulder       Other    Tobacco dependence     Other Visit Diagnoses       Follow-up exam    -  Primary              Plan:       The patient presents today for follow-up  She is up to date on mammogram and colonoscopy.  Colonoscopy was done in 2022 with repeat in 5 years.  Next mammogram is scheduled for June of this year.    2.   Essential hypertension- blood pressure is 110/70   She is on losartan-hctz 50/12.5.     3.   Hypothyroidism- she is on levothyroxine 100 mcg daily.  Clinically she appears euthyroid 4    4.   GERD- s  Stable with no acute issues    5.   Arthralgias- neck and left shoulder. We will image neck today. Toradol and steroid injection. Mobic prn. Also robaxin prn  11/29-she is still having some issues.  X-ray with no acute process.  We will continue with NSAIDs and Robaxin.  Refer to physical therapy  5/30/23-she is still having some issues.  Plain x-ray showed some arthritic changes.  She is now having some numbness and tingling as well.  We are going to set her up  for an MRI of her cervical spine and refer her to pain management.  Toradol injection today.  I have also started gabapentin.  11/30/23-she was able to get in with pain management they set her up for some injections but they had to be postponed and his she has not set this back up yet  5/30/24-she never had the injections.  Currently she is doing okay.  We are going to give her some Robaxin as needed.    6.   Tobacco dependence- counseled on cessation. She is in contemplation stage. She quit before for 3 months using chantix    Billing based on moderate level of medical decision-making      RTC 6 months

## 2024-06-20 ENCOUNTER — HOSPITAL ENCOUNTER (OUTPATIENT)
Dept: RADIOLOGY | Facility: HOSPITAL | Age: 54
Discharge: HOME | End: 2024-06-20
Attending: INTERNAL MEDICINE
Payer: COMMERCIAL

## 2024-06-20 DIAGNOSIS — Z12.31 BREAST CANCER SCREENING BY MAMMOGRAM: ICD-10-CM

## 2024-06-20 PROCEDURE — 77067 SCR MAMMO BI INCL CAD: CPT | Mod: TC

## 2024-09-28 ENCOUNTER — HOSPITAL ENCOUNTER (EMERGENCY)
Facility: HOSPITAL | Age: 54
Discharge: HOME | End: 2024-09-28
Payer: COMMERCIAL

## 2024-09-28 VITALS
OXYGEN SATURATION: 99 % | WEIGHT: 182 LBS | DIASTOLIC BLOOD PRESSURE: 87 MMHG | TEMPERATURE: 98 F | SYSTOLIC BLOOD PRESSURE: 128 MMHG | HEIGHT: 67 IN | BODY MASS INDEX: 28.56 KG/M2 | RESPIRATION RATE: 18 BRPM | HEART RATE: 57 BPM

## 2024-09-28 DIAGNOSIS — R07.9 CHEST PAIN: ICD-10-CM

## 2024-09-28 LAB
ALBUMIN SERPL BCP-MCNC: 3.9 G/DL (ref 3.5–5)
ALBUMIN/GLOB SERPL: 1 {RATIO}
ALP SERPL-CCNC: 115 U/L (ref 41–108)
ALT SERPL W P-5'-P-CCNC: 11 U/L (ref 13–56)
ANION GAP SERPL CALCULATED.3IONS-SCNC: 8 MMOL/L (ref 7–16)
AST SERPL W P-5'-P-CCNC: 11 U/L (ref 15–37)
BASOPHILS # BLD AUTO: 0.03 K/UL (ref 0–0.2)
BASOPHILS NFR BLD AUTO: 0.4 % (ref 0–1)
BILIRUB SERPL-MCNC: 0.3 MG/DL (ref ?–1.2)
BUN SERPL-MCNC: 16 MG/DL (ref 7–18)
BUN/CREAT SERPL: 20 (ref 6–20)
CALCIUM SERPL-MCNC: 9.4 MG/DL (ref 8.5–10.1)
CHLORIDE SERPL-SCNC: 103 MMOL/L (ref 98–107)
CO2 SERPL-SCNC: 31 MMOL/L (ref 21–32)
CREAT SERPL-MCNC: 0.81 MG/DL (ref 0.55–1.02)
DIFFERENTIAL METHOD BLD: ABNORMAL
EGFR (NO RACE VARIABLE) (RUSH/TITUS): 87 ML/MIN/1.73M2
EOSINOPHIL # BLD AUTO: 0.16 K/UL (ref 0–0.5)
EOSINOPHIL NFR BLD AUTO: 2.4 % (ref 1–4)
ERYTHROCYTE [DISTWIDTH] IN BLOOD BY AUTOMATED COUNT: 13.1 % (ref 11.5–14.5)
GLOBULIN SER-MCNC: 3.8 G/DL (ref 2–4)
GLUCOSE SERPL-MCNC: 110 MG/DL (ref 74–106)
HCT VFR BLD AUTO: 39.6 % (ref 38–47)
HGB BLD-MCNC: 12.5 G/DL (ref 12–16)
IMM GRANULOCYTES # BLD AUTO: 0.02 K/UL (ref 0–0.04)
IMM GRANULOCYTES NFR BLD: 0.3 % (ref 0–0.4)
INR BLD: 1.08
LYMPHOCYTES # BLD AUTO: 3.23 K/UL (ref 1–4.8)
LYMPHOCYTES NFR BLD AUTO: 48.1 % (ref 27–41)
MAGNESIUM SERPL-MCNC: 2.1 MG/DL (ref 1.7–2.3)
MCH RBC QN AUTO: 30 PG (ref 27–31)
MCHC RBC AUTO-ENTMCNC: 31.6 G/DL (ref 32–36)
MCV RBC AUTO: 95 FL (ref 80–96)
MONOCYTES # BLD AUTO: 0.62 K/UL (ref 0–0.8)
MONOCYTES NFR BLD AUTO: 9.2 % (ref 2–6)
MPC BLD CALC-MCNC: 9.8 FL (ref 9.4–12.4)
NEUTROPHILS # BLD AUTO: 2.65 K/UL (ref 1.8–7.7)
NEUTROPHILS NFR BLD AUTO: 39.6 % (ref 53–65)
NRBC # BLD AUTO: 0 X10E3/UL
NRBC, AUTO (.00): 0 %
PLATELET # BLD AUTO: 331 K/UL (ref 150–400)
POTASSIUM SERPL-SCNC: 3.6 MMOL/L (ref 3.5–5.1)
PROT SERPL-MCNC: 7.7 G/DL (ref 6.4–8.2)
PROTHROMBIN TIME: 13.9 SECONDS (ref 11.7–14.7)
RBC # BLD AUTO: 4.17 M/UL (ref 4.2–5.4)
SODIUM SERPL-SCNC: 138 MMOL/L (ref 136–145)
T3FREE SERPL-MCNC: 2.37 PG/ML (ref 2.18–3.98)
T4 FREE SERPL-MCNC: 1.31 NG/DL (ref 0.76–1.46)
TROPONIN I SERPL DL<=0.01 NG/ML-MCNC: 8.6 PG/ML
TROPONIN I SERPL DL<=0.01 NG/ML-MCNC: 8.9 PG/ML
TSH SERPL DL<=0.005 MIU/L-ACNC: 0.38 UIU/ML (ref 0.36–3.74)
WBC # BLD AUTO: 6.71 K/UL (ref 4.5–11)

## 2024-09-28 PROCEDURE — 85025 COMPLETE CBC W/AUTO DIFF WBC: CPT

## 2024-09-28 PROCEDURE — 84484 ASSAY OF TROPONIN QUANT: CPT

## 2024-09-28 PROCEDURE — 36415 COLL VENOUS BLD VENIPUNCTURE: CPT

## 2024-09-28 PROCEDURE — 83735 ASSAY OF MAGNESIUM: CPT

## 2024-09-28 PROCEDURE — 93005 ELECTROCARDIOGRAM TRACING: CPT

## 2024-09-28 PROCEDURE — 85610 PROTHROMBIN TIME: CPT

## 2024-09-28 PROCEDURE — 84443 ASSAY THYROID STIM HORMONE: CPT

## 2024-09-28 PROCEDURE — 99284 EMERGENCY DEPT VISIT MOD MDM: CPT | Mod: 25

## 2024-09-28 PROCEDURE — 84481 FREE ASSAY (FT-3): CPT

## 2024-09-28 PROCEDURE — 80053 COMPREHEN METABOLIC PANEL: CPT

## 2024-09-28 PROCEDURE — 84439 ASSAY OF FREE THYROXINE: CPT

## 2024-09-28 PROCEDURE — 93010 ELECTROCARDIOGRAM REPORT: CPT | Mod: ,,, | Performed by: HOSPITALIST

## 2024-09-29 NOTE — ED PROVIDER NOTES
Encounter Date: 9/28/2024       History     Chief Complaint   Patient presents with    Chest Pain     54-year old  male presents to the emergency department for evaluation of chest pain. States that the chest pain began several days ago in the center of her chest and hurts when touched. Further states that pain radiates down left shoulder and arm. Denies fever, chills, headache, blurred vision, nausea, vomiting, neck pain/stiffness, shortness of breath, abdominal pain, dysuria. Reports a history of thyroid disorder and anxiety.    The history is provided by the patient. No  was used.   Chest Pain  The current episode started several days ago. Chest pain occurs intermittently. The chest pain is unchanged. The quality of the pain is described as aching. The pain radiates to the left arm. Pertinent negatives for primary symptoms include no fever, no fatigue, no shortness of breath, no cough, no wheezing, no palpitations, no abdominal pain, no nausea, no vomiting and no dizziness.   Pertinent negatives for associated symptoms include no weakness. She tried nothing for the symptoms. There are no known risk factors.   Her past medical history is significant for anxiety/panic attacks.     Review of patient's allergies indicates:  No Known Allergies  Past Medical History:   Diagnosis Date    Hypertension     Thyroid disease      Past Surgical History:   Procedure Laterality Date    OOPHORECTOMY      Robotic assisted vaginal hysterectomy (DVH) with a unilateral salpingo oophorectomy      TUBAL LIGATION       Family History   Problem Relation Name Age of Onset    Breast cancer Mother Rachana Solares     Cancer Mother Rachana Solares     Breast cancer Sister Kenya Solares     Cancer Sister Kenya Solares     Arthritis Maternal Aunt Osiris Mcgrath      Social History     Tobacco Use    Smoking status: Every Day     Current packs/day: 0.50     Average packs/day: 0.5 packs/day for 30.0 years (15.0 ttl pk-yrs)      Types: Cigarettes    Smokeless tobacco: Never    Tobacco comments:     Been smoking for over 30 years   Substance Use Topics    Alcohol use: Yes     Alcohol/week: 1.0 standard drink of alcohol     Types: 1 Glasses of wine per week     Comment: A glass of wine maybe twice a month    Drug use: Never     Review of Systems   Constitutional:  Negative for chills, fatigue and fever.   Eyes:  Negative for photophobia and pain.   Respiratory:  Negative for cough, chest tightness, shortness of breath, wheezing and stridor.    Cardiovascular:  Positive for chest pain. Negative for palpitations and leg swelling.   Gastrointestinal:  Negative for abdominal pain, diarrhea, nausea and vomiting.   Genitourinary:  Negative for decreased urine volume, difficulty urinating and dysuria.   Musculoskeletal:  Negative for neck pain and neck stiffness.   Neurological:  Negative for dizziness, tremors and weakness.   Psychiatric/Behavioral:  Negative for confusion.    All other systems reviewed and are negative.      Physical Exam     Initial Vitals [09/28/24 1923]   BP Pulse Resp Temp SpO2   120/69 70 16 98 °F (36.7 °C) 98 %      MAP       --         Physical Exam    Vitals reviewed.  Constitutional: She appears well-nourished. No distress.   HENT:   Head: Normocephalic.   Eyes: Conjunctivae are normal. Pupils are equal, round, and reactive to light. Right eye exhibits no discharge. Left eye exhibits no discharge.   Neck: Neck supple.   Normal range of motion.  Cardiovascular:  Normal rate and regular rhythm.           Pulmonary/Chest: Breath sounds normal. No respiratory distress. She has no wheezes. She has no rhonchi. She exhibits no tenderness, no bony tenderness, no laceration, no crepitus, no deformity and no retraction.   Abdominal: Abdomen is soft. Bowel sounds are normal. She exhibits no distension. There is no abdominal tenderness. There is no guarding.   Musculoskeletal:         General: Normal range of motion.      Cervical  back: Normal range of motion and neck supple.     Lymphadenopathy:     She has no cervical adenopathy.   Neurological: She is alert and oriented to person, place, and time. No sensory deficit. GCS score is 15. GCS eye subscore is 4. GCS verbal subscore is 5. GCS motor subscore is 6.   Skin: Skin is warm and dry. Capillary refill takes less than 2 seconds.   Psychiatric: She has a normal mood and affect. Her behavior is normal.         Medical Screening Exam   See Full Note    ED Course   Procedures  Labs Reviewed   COMPREHENSIVE METABOLIC PANEL - Abnormal       Result Value    Sodium 138      Potassium 3.6      Chloride 103      CO2 31      Anion Gap 8      Glucose 110 (*)     BUN 16      Creatinine 0.81      BUN/Creatinine Ratio 20      Calcium 9.4      Total Protein 7.7      Albumin 3.9      Globulin 3.8      A/G Ratio 1.0      Bilirubin, Total 0.3      Alk Phos 115 (*)     ALT 11 (*)     AST 11 (*)     eGFR 87     CBC WITH DIFFERENTIAL - Abnormal    WBC 6.71      RBC 4.17 (*)     Hemoglobin 12.5      Hematocrit 39.6      MCV 95.0      MCH 30.0      MCHC 31.6 (*)     RDW 13.1      Platelet Count 331      MPV 9.8      Neutrophils % 39.6 (*)     Lymphocytes % 48.1 (*)     Monocytes % 9.2 (*)     Eosinophils % 2.4      Basophils % 0.4      Immature Granulocytes % 0.3      nRBC, Auto 0.0      Neutrophils, Abs 2.65      Lymphocytes, Absolute 3.23      Monocytes, Absolute 0.62      Eosinophils, Absolute 0.16      Basophils, Absolute 0.03      Immature Granulocytes, Absolute 0.02      nRBC, Absolute 0.00      Diff Type Auto     TROPONIN I - Normal    Troponin I High Sensitivity 8.6     PROTIME-INR - Normal    PT 13.9      INR 1.08     MAGNESIUM - Normal    Magnesium 2.1     THYROID PANEL - Normal    Free T4 1.31      TSH 0.381     T3, FREE - Normal    Free T3 2.37     TROPONIN I - Normal    Troponin I High Sensitivity 8.9     CBC W/ AUTO DIFFERENTIAL    Narrative:     The following orders were created for panel order CBC  auto differential.  Procedure                               Abnormality         Status                     ---------                               -----------         ------                     CBC with Differential[[id number]]       Abnormal            Final result                 Please view results for these tests on the individual orders.          Imaging Results              X-Ray Chest AP Portable (Final result)  Result time 09/28/24 20:02:21      Final result by Wyatt Pate MD (09/28/24 20:02:21)                   Impression:      No acute process.      Electronically signed by: Wyatt Pate MD  Date:    09/28/2024  Time:    20:02               Narrative:    EXAMINATION:  XR CHEST AP PORTABLE    CLINICAL HISTORY:  Chest Pain.    TECHNIQUE:  Single frontal view of the chest was performed.    COMPARISON:  08/15/2022.    FINDINGS:  The trachea is unremarkable.  The cardiomediastinal silhouette is within normal limits.  The hilar structures are unremarkable.  There is no evidence of free air the hemidiaphragms.  There are no pleural effusions.  There is no evidence of a pneumothorax.  There is no evidence of pneumomediastinum.  No airspace opacity is present.  The osseous structures are unremarkable.                                       Medications - No data to display  Medical Decision Making  54-year old  male presents to the emergency department for evaluation of chest pain. States that the chest pain began several days ago in the center of her chest and hurts when touched. Further states that pain radiates down left shoulder and arm. Denies fever, chills, headache, blurred vision, nausea, vomiting, neck pain/stiffness, shortness of breath, abdominal pain, dysuria. Reports a history of thyroid disorder and anxiety.  Ordered and reviewed EKG with results significant for   Ordered and reviewed chest x-ray as well as radiologist's interpretation with results significant for no acute process.  Ordered and  reviewed labs   Follow up and return precautions discussed with patient, with patient verbalizing understanding   Referral given to cardiology for discharge  Diagnosis: Chest pain    Amount and/or Complexity of Data Reviewed  Labs: ordered.  Radiology: ordered.                                      Clinical Impression:   Final diagnoses:  [R07.9] Chest pain        ED Disposition Condition    Discharge Stable          ED Prescriptions    None       Follow-up Information    None          Avery Weldon NP  09/28/24 [id number]

## 2024-09-29 NOTE — DISCHARGE INSTRUCTIONS
Follow up with Cardiology, they will call you with an appointment date/time.  Take Tylenol and Motrin every 4-6 hours as needed for pain.  Return to the emergency department for new or worsening symptoms.

## 2024-10-02 ENCOUNTER — OFFICE VISIT (OUTPATIENT)
Dept: CARDIOLOGY | Facility: CLINIC | Age: 54
End: 2024-10-02
Payer: COMMERCIAL

## 2024-10-02 ENCOUNTER — HOSPITAL ENCOUNTER (OUTPATIENT)
Dept: RADIOLOGY | Facility: HOSPITAL | Age: 54
Discharge: HOME | End: 2024-10-02
Attending: INTERNAL MEDICINE
Payer: COMMERCIAL

## 2024-10-02 VITALS
HEIGHT: 67 IN | BODY MASS INDEX: 28 KG/M2 | DIASTOLIC BLOOD PRESSURE: 64 MMHG | SYSTOLIC BLOOD PRESSURE: 98 MMHG | OXYGEN SATURATION: 98 % | HEART RATE: 69 BPM | WEIGHT: 178.38 LBS

## 2024-10-02 DIAGNOSIS — K21.9 GASTROESOPHAGEAL REFLUX DISEASE, UNSPECIFIED WHETHER ESOPHAGITIS PRESENT: Chronic | ICD-10-CM

## 2024-10-02 DIAGNOSIS — F17.200 SMOKER: Chronic | ICD-10-CM

## 2024-10-02 DIAGNOSIS — R07.9 CHEST PAIN: ICD-10-CM

## 2024-10-02 DIAGNOSIS — I10 ESSENTIAL HYPERTENSION: Chronic | ICD-10-CM

## 2024-10-02 DIAGNOSIS — R07.9 CHEST PAIN: Primary | ICD-10-CM

## 2024-10-02 DIAGNOSIS — F41.9 ANXIETY: Chronic | ICD-10-CM

## 2024-10-02 PROCEDURE — 1159F MED LIST DOCD IN RCRD: CPT | Mod: CPTII,,, | Performed by: INTERNAL MEDICINE

## 2024-10-02 PROCEDURE — 71046 X-RAY EXAM CHEST 2 VIEWS: CPT | Mod: 26,,, | Performed by: RADIOLOGY

## 2024-10-02 PROCEDURE — 99999 PR PBB SHADOW E&M-EST. PATIENT-LVL IV: CPT | Mod: PBBFAC,,, | Performed by: INTERNAL MEDICINE

## 2024-10-02 PROCEDURE — 3078F DIAST BP <80 MM HG: CPT | Mod: CPTII,,, | Performed by: INTERNAL MEDICINE

## 2024-10-02 PROCEDURE — 1160F RVW MEDS BY RX/DR IN RCRD: CPT | Mod: CPTII,,, | Performed by: INTERNAL MEDICINE

## 2024-10-02 PROCEDURE — 99204 OFFICE O/P NEW MOD 45 MIN: CPT | Mod: S$PBB,,, | Performed by: INTERNAL MEDICINE

## 2024-10-02 PROCEDURE — 71046 X-RAY EXAM CHEST 2 VIEWS: CPT | Mod: TC

## 2024-10-02 PROCEDURE — 3044F HG A1C LEVEL LT 7.0%: CPT | Mod: CPTII,,, | Performed by: INTERNAL MEDICINE

## 2024-10-02 PROCEDURE — 3008F BODY MASS INDEX DOCD: CPT | Mod: CPTII,,, | Performed by: INTERNAL MEDICINE

## 2024-10-02 PROCEDURE — 99214 OFFICE O/P EST MOD 30 MIN: CPT | Mod: PBBFAC,25 | Performed by: INTERNAL MEDICINE

## 2024-10-02 PROCEDURE — 3074F SYST BP LT 130 MM HG: CPT | Mod: CPTII,,, | Performed by: INTERNAL MEDICINE

## 2024-10-07 PROBLEM — R07.9 CHEST PAIN: Status: ACTIVE | Noted: 2024-10-07

## 2024-10-07 NOTE — PROGRESS NOTES
"PCP: Yobani Lombardo DO    Referring Provider:     Subjective:   Adele Solares is a 54 y.o. female with hx of HTN, GERD, anxiety, and hypothyroidism who presents for evaluation of chest pain.  Referred by ANDRES Weldon NP in ED. "Chest pain sometimes feels like pinprick, sometimes like pressure, sometimes goes to left arm.  I have arthritis in my neck and shoulders".  Chest pain is not exertional.  Back pain chronic, over 10 years.       Fhx:  Family History   Problem Relation Name Age of Onset    Breast cancer Mother Rachana Solares     Cancer Mother Rachana Solares     Breast cancer Sister Kenya Solares     Cancer Sister Kenya Solares     Arthritis Maternal Aunt Osiris Mcgrath      Shx:   Social History     Socioeconomic History    Marital status: Single   Tobacco Use    Smoking status: Every Day     Current packs/day: 0.50     Average packs/day: 0.5 packs/day for 30.0 years (15.0 ttl pk-yrs)     Types: Cigarettes    Smokeless tobacco: Never    Tobacco comments:     Been smoking for over 30 years   Substance and Sexual Activity    Alcohol use: Yes     Alcohol/week: 1.0 standard drink of alcohol     Types: 1 Glasses of wine per week     Comment: A glass of wine maybe twice a month    Drug use: Never    Sexual activity: Yes     Partners: Male     Birth control/protection: None       EKG   9/28/24- NSR, 68 bpm     LEXISCAN:  3/6/18--No definite findings of significant reversible ischemia can be detected.  Fixed basal anterior wall and basal inferior wall defect with no associated wall motion abnormalities suggestive of possible attenuation artifact appeared to be more likely than ischemia. Normal LV size and systolic function. EF 55%.     Lab Results   Component Value Date     09/28/2024    K 3.6 09/28/2024     09/28/2024    CO2 31 09/28/2024    BUN 16 09/28/2024    CREATININE 0.81 09/28/2024    CALCIUM 9.4 09/28/2024    ANIONGAP 8 09/28/2024    ESTGFRAFRICA 93 11/01/2021    EGFRNONAA 86 06/06/2022 "       Lab Results   Component Value Date    CHOL 175 11/30/2023    CHOL 161 09/08/2022    CHOL 189 06/06/2022     Lab Results   Component Value Date    HDL 43 11/30/2023    HDL 36 (L) 09/08/2022    HDL 45 06/06/2022     Lab Results   Component Value Date    LDLCALC 118 11/30/2023    LDLCALC 108 09/08/2022    LDLCALC 131 06/06/2022     Lab Results   Component Value Date    TRIG 72 11/30/2023    TRIG 85 09/08/2022    TRIG 64 06/06/2022     Lab Results   Component Value Date    CHOLHDL 4.1 11/30/2023    CHOLHDL 4.5 09/08/2022    CHOLHDL 4.2 06/06/2022       Lab Results   Component Value Date    WBC 6.71 09/28/2024    HGB 12.5 09/28/2024    HCT 39.6 09/28/2024    MCV 95.0 09/28/2024     09/28/2024           Current Outpatient Medications:     busPIRone (BUSPAR) 7.5 MG tablet, Take 1 tablet (7.5 mg total) by mouth 3 (three) times daily., Disp: 90 tablet, Rfl: 5    butenafine (MENTAX) 1 % cream, Apply topically 2 (two) times daily., Disp: 30 g, Rfl: 0    ibuprofen (ADVIL,MOTRIN) 800 MG tablet, Take 1 tablet (800 mg total) by mouth 3 (three) times daily., Disp: 30 tablet, Rfl: 1    levothyroxine (SYNTHROID) 100 MCG tablet, Take 1 tablet (100 mcg total) by mouth before breakfast., Disp: 90 tablet, Rfl: 3    losartan-hydrochlorothiazide 50-12.5 mg (HYZAAR) 50-12.5 mg per tablet, TAKE 1 TABLET BY MOUTH EVERY DAY, Disp: 90 tablet, Rfl: 3    omeprazole (PRILOSEC) 40 MG capsule, Take 1 capsule (40 mg total) by mouth once daily., Disp: 90 capsule, Rfl: 3    gabapentin (NEURONTIN) 100 MG capsule, Take 1 capsule (100 mg total) by mouth 3 (three) times daily. (Patient not taking: Reported on 10/2/2024), Disp: 90 capsule, Rfl: 3  Did not bring medications.     Review of Systems   Respiratory:  Negative for shortness of breath.    Cardiovascular:  Positive for chest pain. Negative for palpitations and leg swelling.   Neurological:  Negative for loss of consciousness.           Objective:   BP 98/64 (BP Location: Left arm,  "Patient Position: Sitting)   Pulse 69   Ht 5' 7" (1.702 m)   Wt 80.9 kg (178 lb 6.4 oz)   SpO2 98%   BMI 27.94 kg/m²     Physical Exam  Vitals reviewed.   Constitutional:       General: She is not in acute distress.     Appearance: Normal appearance.   HENT:      Head: Normocephalic and atraumatic.   Neck:      Vascular: No carotid bruit or JVD.   Cardiovascular:      Rate and Rhythm: Normal rate and regular rhythm.      Pulses: Normal pulses.           Radial pulses are 2+ on the right side and 2+ on the left side.      Heart sounds: Normal heart sounds. No murmur heard.  Pulmonary:      Effort: Pulmonary effort is normal.      Breath sounds: Normal breath sounds.   Musculoskeletal:      Right lower leg: No edema.      Left lower leg: No edema.   Skin:     General: Skin is warm and dry.   Neurological:      Mental Status: She is alert.           Assessment:     1. Chest pain  Ambulatory referral/consult to Cardiology    Echo    Exercise Stress - EKG    X-Ray Chest PA And Lateral      2. Essential hypertension        3. Gastroesophageal reflux disease, unspecified whether esophagitis present        4. Anxiety        5. Smoker              Plan:   Refer to Dr. Gonsales- chiropractor (number given so pt could call and schedule an appointment)  Stop smoking  Cxr- pa/ lat  Echo  EST  F/u after tests.         "

## 2024-10-10 LAB
OHS QRS DURATION: 88 MS
OHS QTC CALCULATION: 408 MS

## 2024-10-21 ENCOUNTER — HOSPITAL ENCOUNTER (OUTPATIENT)
Dept: CARDIOLOGY | Facility: HOSPITAL | Age: 54
Discharge: HOME | End: 2024-10-21
Attending: INTERNAL MEDICINE
Payer: COMMERCIAL

## 2024-10-21 VITALS
BODY MASS INDEX: 27.94 KG/M2 | DIASTOLIC BLOOD PRESSURE: 76 MMHG | HEIGHT: 67 IN | SYSTOLIC BLOOD PRESSURE: 116 MMHG | HEART RATE: 66 BPM

## 2024-10-21 DIAGNOSIS — R07.9 CHEST PAIN: ICD-10-CM

## 2024-10-21 LAB
AORTIC ROOT ANNULUS: 2.48 CM
AV INDEX (PROSTH): 0.72
AV MEAN GRADIENT: 3.1 MMHG
AV PEAK GRADIENT: 6.8 MMHG
AV VALVE AREA BY VELOCITY RATIO: 2.2 CM²
AV VALVE AREA: 2.3 CM²
AV VELOCITY RATIO: 0.69
CV ECHO LV RWT: 0.39 CM
DOP CALC AO PEAK VEL: 1.3 M/S
DOP CALC AO VTI: 29.7 CM
DOP CALC LVOT AREA: 3.1 CM2
DOP CALC LVOT DIAMETER: 2 CM
DOP CALC LVOT PEAK VEL: 0.9 M/S
DOP CALC LVOT STROKE VOLUME: 67.5 CM3
DOP CALCLVOT PEAK VEL VTI: 21.5 CM
E WAVE DECELERATION TIME: 247.79 MSEC
E/A RATIO: 1.46
E/E' RATIO: 8 M/S
ECHO LV POSTERIOR WALL: 1 CM (ref 0.6–1.1)
FRACTIONAL SHORTENING: 21.6 % (ref 28–44)
INTERVENTRICULAR SEPTUM: 0.8 CM (ref 0.6–1.1)
IVC DIAMETER: 1.45 CM
LEFT ATRIUM AREA SYSTOLIC (APICAL 2 CHAMBER): 16.05 CM2
LEFT ATRIUM AREA SYSTOLIC (APICAL 4 CHAMBER): 17.49 CM2
LEFT ATRIUM VOLUME MOD: 45.7 ML
LEFT INTERNAL DIMENSION IN SYSTOLE: 4 CM (ref 2.1–4)
LEFT VENTRICLE DIASTOLIC VOLUME: 122.4 ML
LEFT VENTRICLE END SYSTOLIC VOLUME APICAL 2 CHAMBER: 42.78 ML
LEFT VENTRICLE END SYSTOLIC VOLUME APICAL 4 CHAMBER: 39.94 ML
LEFT VENTRICLE SYSTOLIC VOLUME: 69.81 ML
LEFT VENTRICULAR INTERNAL DIMENSION IN DIASTOLE: 5.1 CM (ref 3.5–6)
LEFT VENTRICULAR MASS: 163.6 G
LV LATERAL E/E' RATIO: 7.5 M/S
LV SEPTAL E/E' RATIO: 8.57 M/S
LVED V (TEICH): 122.4 ML
LVES V (TEICH): 69.81 ML
LVOT MG: 1.78 MMHG
LVOT MV: 0.61 CM/S
MV PEAK A VEL: 0.41 M/S
MV PEAK E VEL: 0.6 M/S
OHS CV RV/LV RATIO: 0.61 CM
OHS LV EJECTION FRACTION SIMPSONS BIPLANE MOD: 55 %
PISA TR MAX VEL: 2.36 M/S
PV PEAK GRADIENT: 2 MMHG
PV PEAK VELOCITY: 0.72 M/S
RA PRESSURE ESTIMATED: 3 MMHG
RA VOL SYS: 43.79 ML
RIGHT ATRIAL AREA: 15.8 CM2
RIGHT ATRIUM VOLUME AREA LENGTH APICAL 4 CHAMBER: 40.17 ML
RIGHT VENTRICLE DIASTOLIC BASEL DIMENSION: 3.1 CM
RIGHT VENTRICLE DIASTOLIC LENGTH: 7.4 CM
RIGHT VENTRICLE DIASTOLIC MID DIMENSION: 2.5 CM
RIGHT VENTRICULAR LENGTH IN DIASTOLE (APICAL 4-CHAMBER VIEW): 7.42 CM
RV MID DIAMA: 2.45 CM
RV TB RVSP: 5 MMHG
TDI LATERAL: 0.08 M/S
TDI SEPTAL: 0.07 M/S
TDI: 0.08 M/S
TR MAX PG: 22 MMHG
TRICUSPID ANNULAR PLANE SYSTOLIC EXCURSION: 2.25 CM
TV REST PULMONARY ARTERY PRESSURE: 25 MMHG

## 2024-10-21 PROCEDURE — 93306 TTE W/DOPPLER COMPLETE: CPT | Mod: 26,,, | Performed by: STUDENT IN AN ORGANIZED HEALTH CARE EDUCATION/TRAINING PROGRAM

## 2024-10-21 PROCEDURE — 93018 CV STRESS TEST I&R ONLY: CPT | Mod: ,,, | Performed by: STUDENT IN AN ORGANIZED HEALTH CARE EDUCATION/TRAINING PROGRAM

## 2024-10-21 PROCEDURE — 93016 CV STRESS TEST SUPVJ ONLY: CPT | Mod: ,,, | Performed by: STUDENT IN AN ORGANIZED HEALTH CARE EDUCATION/TRAINING PROGRAM

## 2024-10-21 PROCEDURE — 93017 CV STRESS TEST TRACING ONLY: CPT

## 2024-10-21 PROCEDURE — 93306 TTE W/DOPPLER COMPLETE: CPT

## 2024-10-23 LAB
CV STRESS BASE HR: 66 BPM
DIASTOLIC BLOOD PRESSURE: 76 MMHG
OHS CV CPX 1 MINUTE RECOVERY HEART RATE: 90 BPM
OHS CV CPX 85 PERCENT MAX PREDICTED HEART RATE MALE: 143
OHS CV CPX ESTIMATED METS: 7
OHS CV CPX MAX PREDICTED HEART RATE: 168
OHS CV CPX PATIENT IS FEMALE: 1
OHS CV CPX PATIENT IS MALE: 0
OHS CV CPX PEAK DIASTOLIC BLOOD PRESSURE: 72 MMHG
OHS CV CPX PEAK HEAR RATE: 146 BPM
OHS CV CPX PEAK RATE PRESSURE PRODUCT: NORMAL
OHS CV CPX PEAK SYSTOLIC BLOOD PRESSURE: 165 MMHG
OHS CV CPX PERCENT MAX PREDICTED HEART RATE ACHIEVED: 91
OHS CV CPX RATE PRESSURE PRODUCT PRESENTING: 7656
SYSTOLIC BLOOD PRESSURE: 116 MMHG

## 2024-11-05 ENCOUNTER — OFFICE VISIT (OUTPATIENT)
Dept: CARDIOLOGY | Facility: CLINIC | Age: 54
End: 2024-11-05
Payer: COMMERCIAL

## 2024-11-05 VITALS
BODY MASS INDEX: 28.47 KG/M2 | HEART RATE: 73 BPM | WEIGHT: 181.38 LBS | DIASTOLIC BLOOD PRESSURE: 62 MMHG | SYSTOLIC BLOOD PRESSURE: 104 MMHG | HEIGHT: 67 IN | OXYGEN SATURATION: 96 %

## 2024-11-05 DIAGNOSIS — Z79.899 HIGH RISK MEDICATION USE: ICD-10-CM

## 2024-11-05 DIAGNOSIS — Z13.220 LIPID SCREENING: Primary | ICD-10-CM

## 2024-11-05 DIAGNOSIS — Z82.49 FAMILY HISTORY OF PREMATURE CAD: ICD-10-CM

## 2024-11-05 DIAGNOSIS — R94.30 ABNORMAL RESULTS OF CARDIOVASCULAR FUNCTION STUDIES: ICD-10-CM

## 2024-11-05 DIAGNOSIS — R94.39 ABNORMAL CARDIOVASCULAR STRESS TEST: Primary | ICD-10-CM

## 2024-11-05 DIAGNOSIS — R00.2 PALPITATIONS: ICD-10-CM

## 2024-11-05 DIAGNOSIS — Z01.812 PRE-OPERATIVE LABORATORY EXAMINATION: ICD-10-CM

## 2024-11-05 DIAGNOSIS — I10 ESSENTIAL HYPERTENSION: ICD-10-CM

## 2024-11-05 DIAGNOSIS — R07.9 CHEST PAIN, UNSPECIFIED TYPE: ICD-10-CM

## 2024-11-05 DIAGNOSIS — F17.200 SMOKER: ICD-10-CM

## 2024-11-05 PROCEDURE — 3078F DIAST BP <80 MM HG: CPT | Mod: CPTII,,, | Performed by: NURSE PRACTITIONER

## 2024-11-05 PROCEDURE — 99214 OFFICE O/P EST MOD 30 MIN: CPT | Mod: PBBFAC | Performed by: NURSE PRACTITIONER

## 2024-11-05 PROCEDURE — 3044F HG A1C LEVEL LT 7.0%: CPT | Mod: CPTII,,, | Performed by: NURSE PRACTITIONER

## 2024-11-05 PROCEDURE — 99215 OFFICE O/P EST HI 40 MIN: CPT | Mod: S$PBB,,, | Performed by: NURSE PRACTITIONER

## 2024-11-05 PROCEDURE — 99999 PR PBB SHADOW E&M-EST. PATIENT-LVL IV: CPT | Mod: PBBFAC,,, | Performed by: NURSE PRACTITIONER

## 2024-11-05 PROCEDURE — 1160F RVW MEDS BY RX/DR IN RCRD: CPT | Mod: CPTII,,, | Performed by: NURSE PRACTITIONER

## 2024-11-05 PROCEDURE — 3074F SYST BP LT 130 MM HG: CPT | Mod: CPTII,,, | Performed by: NURSE PRACTITIONER

## 2024-11-05 PROCEDURE — 1159F MED LIST DOCD IN RCRD: CPT | Mod: CPTII,,, | Performed by: NURSE PRACTITIONER

## 2024-11-05 PROCEDURE — 3008F BODY MASS INDEX DOCD: CPT | Mod: CPTII,,, | Performed by: NURSE PRACTITIONER

## 2024-11-05 RX ORDER — SODIUM CHLORIDE 0.9 % (FLUSH) 0.9 %
10 SYRINGE (ML) INJECTION
OUTPATIENT
Start: 2024-11-18

## 2024-11-05 RX ORDER — ASPIRIN 81 MG/1
81 TABLET ORAL DAILY
Qty: 90 TABLET | Refills: 3 | Status: SHIPPED | OUTPATIENT
Start: 2024-11-05 | End: 2025-11-05

## 2024-11-05 NOTE — ASSESSMENT & PLAN NOTE
EST  done 10/21/2024 did not reproduce her chest/arm pain. The EKG portion of the study was abnormal but not diagnostic.

## 2024-11-05 NOTE — ASSESSMENT & PLAN NOTE
The patient does have a significant h/o premature CAD. Her Dad is  due to kidney failure and heart disease. She has 6 brothers and 4 of the 6 have heart disease. Of the 4 brothers that have CAD/stents, three were in their 40s and one in his 50s.

## 2024-11-05 NOTE — ASSESSMENT & PLAN NOTE
Patient reports two types of chest pain to her left chest. One is a sharp pain and the other is an aching pain. This occurs approx 3-4 times a week lasting seconds; the aching pain radiates down her left arm. She can not identify a trigger or relieving factors. The pain occurs both at rest and with exertion.   EST  done 10/21/2024 did not reproduce her chest/arm pain. The EKG portion of the study was abnormal but not diagnostic.   The patient does have a significant h/o premature CAD. Her Dad is  due to kidney failure and heart disease. She has 6 brothers and 4 of the 6 have heart disease. Of the 4 brothers that have CAD/stents, three were in their 40s and one in his 50s.

## 2024-11-05 NOTE — PROGRESS NOTES
"PCP: Yobani Lombardo DO    Referring Provider:   Chief Complaint   Patient presents with    Hypertension    Chest Pain     That comes and goes, sometimes its sore to touch.    Palpitations     Sometimes when laying down.       Subjective:   Adele Solares is a 54 y.o. female with hx of HTN, GERD, anxiety, hypothyroidism, and family h/o premature CAD in 4/6 brothers, who present to discuss the results of her EST/echo. She continues to have issues with chest pain. Patient reports two types of chest pain to her left chest. One is a sharp pain and the other is an aching pain. This occurs approx 3-4 times a week lasting seconds; the aching pain radiates down her left arm. She can not identify a trigger or relieving factors. The pain occurs both at rest and with exertion.   EST  done 10/21/2024 did not reproduce her chest/arm pain. The EKG portion of the study was abnormal but not diagnostic.   The patient does have a significant h/o premature CAD. Her Dad is  due to kidney failure and heart disease. She has 6 brothers and 4 of the 6 have heart disease. Of the 4 brothers that have CAD/stents, three were in their 40s and one in his 50s.     10/2/2024 presents for evaluation of chest pain.  Referred by ANDRES Weldon NP in ED. "Chest pain sometimes feels like pinprick, sometimes like pressure, sometimes goes to left arm.  I have arthritis in my neck and shoulders".  Chest pain is not exertional.  Back pain chronic, over 10 years.       Fhx:  Family History   Problem Relation Name Age of Onset    Breast cancer Mother Rachana Solares     Cancer Mother Rachana Solares     Breast cancer Sister Kenya Solares     Cancer Sister Kenya Solares     Arthritis Maternal Aunt Osiris Mcgrath      Shx:   Social History     Socioeconomic History    Marital status: Single   Tobacco Use    Smoking status: Every Day     Current packs/day: 0.50     Average packs/day: 0.5 packs/day for 30.0 years (15.0 ttl pk-yrs)     Types: Cigarettes    " Smokeless tobacco: Never    Tobacco comments:     Been smoking for over 30 years   Substance and Sexual Activity    Alcohol use: Yes     Alcohol/week: 1.0 standard drink of alcohol     Types: 1 Glasses of wine per week     Comment: A glass of wine maybe twice a month    Drug use: Never    Sexual activity: Yes     Partners: Male     Birth control/protection: None     Social Drivers of Health     Financial Resource Strain: Low Risk  (10/17/2024)    Overall Financial Resource Strain (CARDIA)     Difficulty of Paying Living Expenses: Not very hard   Food Insecurity: Food Insecurity Present (10/17/2024)    Hunger Vital Sign     Worried About Running Out of Food in the Last Year: Sometimes true     Ran Out of Food in the Last Year: Sometimes true   Physical Activity: Inactive (10/17/2024)    Exercise Vital Sign     Days of Exercise per Week: 0 days     Minutes of Exercise per Session: 0 min   Stress: No Stress Concern Present (10/17/2024)    [employer] - Occupational Stress Questionnaire     Feeling of Stress : Not at all   Housing Stability: High Risk (10/17/2024)    Housing Stability Vital Sign     Unable to Pay for Housing in the Last Year: Yes       EKG   9/28/24- NSR, 68 bpm; when compared with EKG 8/15/2022 no significant change was found.  8/15/2022 RSR with HR 71 bpm; normal EKG    LEXISCAN:  3/6/18--No definite findings of significant reversible ischemia can be detected.  Fixed basal anterior wall and basal inferior wall defect with no associated wall motion abnormalities suggestive of possible attenuation artifact appeared to be more likely than ischemia. Normal LV size and systolic function. EF 55%.     Lab Results   Component Value Date     11/05/2024    K 3.8 11/05/2024     (H) 11/05/2024    CO2 31 11/05/2024    BUN 18 11/05/2024    CREATININE 0.72 11/05/2024    CALCIUM 9.4 11/05/2024    ANIONGAP 7 11/05/2024    ESTGFRAFRICA 93 11/01/2021    EGFRNONAA 86 06/06/2022        Lab Results   Component Value Date    CHOL 174 11/05/2024    CHOL 175 11/30/2023    CHOL 161 09/08/2022     Lab Results   Component Value Date    HDL 40 11/05/2024    HDL 43 11/30/2023    HDL 36 (L) 09/08/2022     Lab Results   Component Value Date    LDLCALC 119 11/05/2024    LDLCALC 118 11/30/2023    LDLCALC 108 09/08/2022     Lab Results   Component Value Date    TRIG 75 11/05/2024    TRIG 72 11/30/2023    TRIG 85 09/08/2022     Lab Results   Component Value Date    CHOLHDL 4.4 11/05/2024    CHOLHDL 4.1 11/30/2023    CHOLHDL 4.5 09/08/2022       Lab Results   Component Value Date    WBC 5.59 11/05/2024    HGB 12.6 11/05/2024    HCT 40.4 11/05/2024    MCV 95.3 11/05/2024     11/05/2024           Current Outpatient Medications:     aspirin (ECOTRIN) 81 MG EC tablet, Take 1 tablet (81 mg total) by mouth once daily., Disp: 90 tablet, Rfl: 3    busPIRone (BUSPAR) 7.5 MG tablet, Take 1 tablet (7.5 mg total) by mouth 3 (three) times daily., Disp: 90 tablet, Rfl: 5    butenafine (MENTAX) 1 % cream, Apply topically 2 (two) times daily., Disp: 30 g, Rfl: 0    gabapentin (NEURONTIN) 100 MG capsule, Take 1 capsule (100 mg total) by mouth 3 (three) times daily. (Patient not taking: Reported on 10/2/2024), Disp: 90 capsule, Rfl: 3    ibuprofen (ADVIL,MOTRIN) 800 MG tablet, Take 1 tablet (800 mg total) by mouth 3 (three) times daily., Disp: 30 tablet, Rfl: 1    levothyroxine (SYNTHROID) 100 MCG tablet, Take 1 tablet (100 mcg total) by mouth before breakfast., Disp: 90 tablet, Rfl: 3    losartan-hydrochlorothiazide 50-12.5 mg (HYZAAR) 50-12.5 mg per tablet, TAKE 1 TABLET BY MOUTH EVERY DAY, Disp: 90 tablet, Rfl: 3    omeprazole (PRILOSEC) 40 MG capsule, Take 1 capsule (40 mg total) by mouth once daily., Disp: 90 capsule, Rfl: 3  Did not bring medications.     Review of Systems   Respiratory:  Negative for shortness of breath.    Cardiovascular:  Positive for chest pain. Negative for palpitations and leg swelling.  "  Neurological:  Negative for loss of consciousness.           Objective:   /62 (BP Location: Left arm, Patient Position: Sitting)   Pulse 73   Ht 5' 7" (1.702 m)   Wt 82.3 kg (181 lb 6.4 oz)   SpO2 96%   BMI 28.41 kg/m²     Physical Exam  Vitals reviewed.   Constitutional:       General: She is not in acute distress.     Appearance: Normal appearance.   HENT:      Head: Normocephalic and atraumatic.   Neck:      Vascular: No carotid bruit or JVD.   Cardiovascular:      Rate and Rhythm: Normal rate and regular rhythm.      Pulses: Normal pulses.           Radial pulses are 2+ on the right side and 2+ on the left side.      Heart sounds: Normal heart sounds. No murmur heard.  Pulmonary:      Effort: Pulmonary effort is normal.      Breath sounds: Normal breath sounds.   Musculoskeletal:      Right lower leg: No edema.      Left lower leg: No edema.      Comments: Chest soreness with palpation of the left chest   Skin:     General: Skin is warm and dry.   Neurological:      Mental Status: She is alert.           Assessment:     1. Lipid screening  Lipid Panel      2. High risk medication use  ALT (SGPT)    CBC Auto Differential    Comprehensive Metabolic Panel      3. Pre-operative laboratory examination  CBC Auto Differential    Comprehensive Metabolic Panel      4. Essential hypertension        5. Smoker        6. Chest pain, unspecified type        7. Abnormal results of cardiovascular function studies        8. Family history of premature CAD              Problem List Items Addressed This Visit          Cardiac/Vascular    Abnormal results of cardiovascular function studies    Current Assessment & Plan     EST  done 10/21/2024 did not reproduce her chest/arm pain. The EKG portion of the study was abnormal but not diagnostic.          Chest pain    Current Assessment & Plan     Patient reports two types of chest pain to her left chest. One is a sharp pain and the other is an aching pain. This occurs " approx 3-4 times a week lasting seconds; the aching pain radiates down her left arm. She can not identify a trigger or relieving factors. The pain occurs both at rest and with exertion.   EST  done 10/21/2024 did not reproduce her chest/arm pain. The EKG portion of the study was abnormal but not diagnostic.   The patient does have a significant h/o premature CAD. Her Dad is  due to kidney failure and heart disease. She has 6 brothers and 4 of the 6 have heart disease. Of the 4 brothers that have CAD/stents, three were in their 40s and one in his 50s.          Essential hypertension    Current Assessment & Plan     104/62 mmHg         Family history of premature CAD    Current Assessment & Plan     The patient does have a significant h/o premature CAD. Her Dad is  due to kidney failure and heart disease. She has 6 brothers and 4 of the 6 have heart disease. Of the 4 brothers that have CAD/stents, three were in their 40s and one in his 50s.             Other    Smoker    Current Assessment & Plan     Changed from smoking cigarettes to vaping. I have encouraged her to stop vaping as well.           Other Visit Diagnoses       Lipid screening    -  Primary    High risk medication use        Pre-operative laboratory examination                 RTC: after C

## 2024-11-12 DIAGNOSIS — R94.31 ABNORMAL ELECTROCARDIOGRAM (ECG) (EKG): Primary | ICD-10-CM

## 2024-11-19 RX ORDER — METOPROLOL TARTRATE 50 MG/1
TABLET ORAL
Qty: 2 TABLET | Refills: 0 | Status: SHIPPED | OUTPATIENT
Start: 2024-11-19 | End: 2025-11-12

## 2024-12-02 ENCOUNTER — TELEPHONE (OUTPATIENT)
Dept: CARDIOLOGY | Facility: CLINIC | Age: 54
End: 2024-12-02
Payer: COMMERCIAL

## 2024-12-02 RX ORDER — OMEPRAZOLE 40 MG/1
40 CAPSULE, DELAYED RELEASE ORAL
Qty: 90 CAPSULE | Refills: 3 | Status: SHIPPED | OUTPATIENT
Start: 2024-12-02

## 2024-12-02 NOTE — TELEPHONE ENCOUNTER
----- Message from Ling sent at 12/2/2024 10:15 AM CST -----  Regarding: Provider call  Who Called: Donnell from [facility] pertaining Adele Solares    Caller is requesting assistance/information from provider's office.      Preferred Method of Contact: Phone Call  Best Call Back Number, if different: [facility] number [phone]  Additional Information: [facility] is requesting for Alysa Hammonds to send pt.'s demographics for them to create pt.'s chart. She stated they do have the Pt.'s insurance. Please fax to [phone]

## 2024-12-06 ENCOUNTER — TELEPHONE (OUTPATIENT)
Dept: FAMILY MEDICINE | Facility: CLINIC | Age: 54
End: 2024-12-06
Payer: COMMERCIAL

## 2024-12-06 NOTE — TELEPHONE ENCOUNTER
----- Message from Luiza sent at 12/5/2024  4:34 PM CST -----  Regarding: appt  Need to reschedule to another day she has to work that day please reschedule  for any day please call [phone]

## 2024-12-12 ENCOUNTER — OFFICE VISIT (OUTPATIENT)
Dept: CARDIOLOGY | Facility: CLINIC | Age: 54
End: 2024-12-12
Payer: COMMERCIAL

## 2024-12-12 VITALS
DIASTOLIC BLOOD PRESSURE: 80 MMHG | OXYGEN SATURATION: 97 % | SYSTOLIC BLOOD PRESSURE: 100 MMHG | BODY MASS INDEX: 28.41 KG/M2 | HEIGHT: 67 IN | HEART RATE: 94 BPM

## 2024-12-12 DIAGNOSIS — R94.30 ABNORMAL RESULTS OF CARDIOVASCULAR FUNCTION STUDIES: ICD-10-CM

## 2024-12-12 DIAGNOSIS — R07.9 CHEST PAIN, UNSPECIFIED TYPE: ICD-10-CM

## 2024-12-12 DIAGNOSIS — I10 ESSENTIAL HYPERTENSION: Primary | ICD-10-CM

## 2024-12-12 PROCEDURE — 1159F MED LIST DOCD IN RCRD: CPT | Mod: CPTII,,, | Performed by: REGISTERED NURSE

## 2024-12-12 PROCEDURE — 3008F BODY MASS INDEX DOCD: CPT | Mod: CPTII,,, | Performed by: REGISTERED NURSE

## 2024-12-12 PROCEDURE — 99213 OFFICE O/P EST LOW 20 MIN: CPT | Mod: PBBFAC | Performed by: REGISTERED NURSE

## 2024-12-12 PROCEDURE — 3044F HG A1C LEVEL LT 7.0%: CPT | Mod: CPTII,,, | Performed by: REGISTERED NURSE

## 2024-12-12 PROCEDURE — 99212 OFFICE O/P EST SF 10 MIN: CPT | Mod: S$PBB,,, | Performed by: REGISTERED NURSE

## 2024-12-12 PROCEDURE — 3074F SYST BP LT 130 MM HG: CPT | Mod: CPTII,,, | Performed by: REGISTERED NURSE

## 2024-12-12 PROCEDURE — 3079F DIAST BP 80-89 MM HG: CPT | Mod: CPTII,,, | Performed by: REGISTERED NURSE

## 2024-12-12 PROCEDURE — 99999 PR PBB SHADOW E&M-EST. PATIENT-LVL III: CPT | Mod: PBBFAC,,, | Performed by: REGISTERED NURSE

## 2024-12-12 NOTE — PROGRESS NOTES
"PCP: Yobani Lombardo DO    Referring Provider:   Chief Complaint   Patient presents with    Chest Pain     Sharp pain that run down left side    Shortness of Breath     Occasionally with and without exertion    Palpitations     Occasionally at rest    Dizziness     Sometimes light headed       Subjective:   Adele Solares is a 54 y.o. female with hx of HTN, GERD, anxiety, hypothyroidism, and family h/o premature CAD in 4/6 brothers.    24: Continues to have weekly chest pain relieved w/rest. Also reports radiation to left arm. CCTA pending. Echo w/ EF 55%- mild- moderate MR. Continue lopressor and asa    24: present to discuss the results of her EST/echo. She continues to have issues with chest pain. Patient reports two types of chest pain to her left chest. One is a sharp pain and the other is an aching pain. This occurs approx 3-4 times a week lasting seconds; the aching pain radiates down her left arm. She can not identify a trigger or relieving factors. The pain occurs both at rest and with exertion.   EST  done 10/21/2024 did not reproduce her chest/arm pain. The EKG portion of the study was abnormal but not diagnostic.   The patient does have a significant h/o premature CAD. Her Dad is  due to kidney failure and heart disease. She has 6 brothers and 4 of the 6 have heart disease. Of the 4 brothers that have CAD/stents, three were in their 40s and one in his 50s.     10/2/2024 presents for evaluation of chest pain.  Referred by ANDRES Weldon NP in ED. "Chest pain sometimes feels like pinprick, sometimes like pressure, sometimes goes to left arm.  I have arthritis in my neck and shoulders".  Chest pain is not exertional.  Back pain chronic, over 10 years.       Fhx:  Family History   Problem Relation Name Age of Onset    Breast cancer Mother Rachana Solares     Cancer Mother Rachana Solares     Breast cancer Sister Kenya Solares     Cancer Sister Kenya Solares     Arthritis Maternal Aunt " Osiris Mcgrath      Shx:   Social History     Socioeconomic History    Marital status: Single   Tobacco Use    Smoking status: Every Day     Current packs/day: 0.50     Average packs/day: 0.5 packs/day for 30.0 years (15.0 ttl pk-yrs)     Types: Cigarettes    Smokeless tobacco: Never    Tobacco comments:     Been smoking for over 30 years   Substance and Sexual Activity    Alcohol use: Yes     Alcohol/week: 1.0 standard drink of alcohol     Types: 1 Glasses of wine per week     Comment: A glass of wine maybe twice a month    Drug use: Never    Sexual activity: Yes     Partners: Male     Birth control/protection: None     Social Drivers of Health     Financial Resource Strain: Low Risk  (10/17/2024)    Overall Financial Resource Strain (CARDIA)     Difficulty of Paying Living Expenses: Not very hard   Food Insecurity: Food Insecurity Present (10/17/2024)    Hunger Vital Sign     Worried About Running Out of Food in the Last Year: Sometimes true     Ran Out of Food in the Last Year: Sometimes true   Physical Activity: Inactive (10/17/2024)    Exercise Vital Sign     Days of Exercise per Week: 0 days     Minutes of Exercise per Session: 0 min   Stress: No Stress Concern Present (10/17/2024)    [employer] - Occupational Stress Questionnaire     Feeling of Stress : Not at all   Housing Stability: High Risk (10/17/2024)    Housing Stability Vital Sign     Unable to Pay for Housing in the Last Year: Yes       EKG   9/28/24- NSR, 68 bpm; when compared with EKG 8/15/2022 no significant change was found.  8/15/2022 RSR with HR 71 bpm; normal EKG    LEXISCAN:  3/6/18--No definite findings of significant reversible ischemia can be detected.  Fixed basal anterior wall and basal inferior wall defect with no associated wall motion abnormalities suggestive of possible attenuation artifact appeared to be more likely than ischemia. Normal LV size and systolic function. EF 55%.     Lab Results   Component  Value Date     11/05/2024    K 3.8 11/05/2024     (H) 11/05/2024    CO2 31 11/05/2024    BUN 18 11/05/2024    CREATININE 0.72 11/05/2024    CALCIUM 9.4 11/05/2024    ANIONGAP 7 11/05/2024    ESTGFRAFRICA 93 11/01/2021    EGFRNONAA 86 06/06/2022       Lab Results   Component Value Date    CHOL 174 11/05/2024    CHOL 175 11/30/2023    CHOL 161 09/08/2022     Lab Results   Component Value Date    HDL 40 11/05/2024    HDL 43 11/30/2023    HDL 36 (L) 09/08/2022     Lab Results   Component Value Date    LDLCALC 119 11/05/2024    LDLCALC 118 11/30/2023    LDLCALC 108 09/08/2022     Lab Results   Component Value Date    TRIG 75 11/05/2024    TRIG 72 11/30/2023    TRIG 85 09/08/2022     Lab Results   Component Value Date    CHOLHDL 4.4 11/05/2024    CHOLHDL 4.1 11/30/2023    CHOLHDL 4.5 09/08/2022       Lab Results   Component Value Date    WBC 5.59 11/05/2024    HGB 12.6 11/05/2024    HCT 40.4 11/05/2024    MCV 95.3 11/05/2024     11/05/2024           Current Outpatient Medications:     aspirin (ECOTRIN) 81 MG EC tablet, Take 1 tablet (81 mg total) by mouth once daily., Disp: 90 tablet, Rfl: 3    busPIRone (BUSPAR) 7.5 MG tablet, Take 1 tablet (7.5 mg total) by mouth 3 (three) times daily., Disp: 90 tablet, Rfl: 5    ibuprofen (ADVIL,MOTRIN) 800 MG tablet, Take 1 tablet (800 mg total) by mouth 3 (three) times daily., Disp: 30 tablet, Rfl: 1    levothyroxine (SYNTHROID) 100 MCG tablet, Take 1 tablet (100 mcg total) by mouth before breakfast., Disp: 90 tablet, Rfl: 3    losartan-hydrochlorothiazide 50-12.5 mg (HYZAAR) 50-12.5 mg per tablet, TAKE 1 TABLET BY MOUTH EVERY DAY, Disp: 90 tablet, Rfl: 3    metoprolol tartrate (LOPRESSOR) 50 MG tablet, If heart rate >60 bpm:  take 50 mg tablet 1 hour prior to CTA.  Bring second tab with you to the procedure., Disp: 2 tablet, Rfl: 0    omeprazole (PRILOSEC) 40 MG capsule, TAKE 1 CAPSULE(40 MG) BY MOUTH EVERY DAY, Disp: 90 capsule, Rfl: 3    butenafine (MENTAX) 1 %  "cream, Apply topically 2 (two) times daily. (Patient not taking: Reported on 12/12/2024), Disp: 30 g, Rfl: 0    gabapentin (NEURONTIN) 100 MG capsule, Take 1 capsule (100 mg total) by mouth 3 (three) times daily. (Patient not taking: Reported on 12/12/2024), Disp: 90 capsule, Rfl: 3  Did not bring medications.     Review of Systems   Respiratory:  Negative for shortness of breath.    Cardiovascular:  Positive for chest pain. Negative for palpitations and leg swelling.   Neurological:  Negative for loss of consciousness.           Objective:   /80 (BP Location: Left arm, Patient Position: Sitting)   Pulse 94   Ht 5' 7" (1.702 m)   SpO2 97%   BMI 28.41 kg/m²     Physical Exam  Vitals reviewed.   Constitutional:       General: She is not in acute distress.     Appearance: Normal appearance.   HENT:      Head: Normocephalic and atraumatic.   Neck:      Vascular: No carotid bruit or JVD.   Cardiovascular:      Rate and Rhythm: Normal rate and regular rhythm.      Pulses: Normal pulses.           Radial pulses are 2+ on the right side and 2+ on the left side.      Heart sounds: Normal heart sounds. No murmur heard.  Pulmonary:      Effort: Pulmonary effort is normal.      Breath sounds: Normal breath sounds.   Musculoskeletal:      Right lower leg: No edema.      Left lower leg: No edema.      Comments: Chest soreness with palpation of the left chest   Skin:     General: Skin is warm and dry.   Neurological:      Mental Status: She is alert.           Assessment:     1. Essential hypertension        2. Abnormal results of cardiovascular function studies        3. Chest pain, unspecified type              Problem List Items Addressed This Visit          Cardiac/Vascular    Essential hypertension - Primary    Current Assessment & Plan     controlled         Chest pain    Current Assessment & Plan     Continues to have weekly chest pain relieved w/rest. Also reports radiation in left arm. CTA pending. Echo w/ EF " 55%- mild- moderate MR. Continue lopressor and asa         Abnormal results of cardiovascular function studies    Current Assessment & Plan     Awaiting CTA in [place] on 1/8/2025  Follow up 2-3 weeks after

## 2024-12-12 NOTE — ASSESSMENT & PLAN NOTE
Continues to have weekly chest pain relieved w/rest. Also reports radiation in left arm. CTA pending. Echo w/ EF 55%- mild- moderate MR. Continue lopressor and asa

## 2025-01-07 ENCOUNTER — TELEPHONE (OUTPATIENT)
Dept: CARDIOLOGY | Facility: CLINIC | Age: 55
End: 2025-01-07
Payer: COMMERCIAL

## 2025-01-07 NOTE — TELEPHONE ENCOUNTER
----- Message from Tech [place] sent at 1/7/2025  8:08 AM CST -----  Who Called: ANDRE RUSH ([facility])     Caller is requesting assistance/information from provider's office.          Preferred Method of Contact: Phone Call  Patient's Preferred Phone Number on File: [phone]   Best Call Back Number, if different:  Additional Information: The clinic called to see if there could be an extended PA on the CTA that's scheduled for tomorrow, Jan 8th. The call back number is [phone]

## 2025-01-07 NOTE — TELEPHONE ENCOUNTER
Attempted to let Mrs. Hernández know that [facility] with no answer yet. I let her know I attempted to contact them again, but we do not have the extended auth yet. I asked her to return my call.

## 2025-01-07 NOTE — TELEPHONE ENCOUNTER
----- Message from Garima sent at 1/7/2025  4:35 PM CST -----  Hey, they are saying this case was withdrawn. I am trying to find out who or what office requested for it to be withdraw. I will let you know as soon as possible.  ----- Message -----  From: Roslyn Tsang RN  Sent: 1/7/2025  11:21 AM CST  To: Garimahero Matias    Mrs. Painting,   Could you help me? Thank you!  ----- Message -----  From: Roslyn Tsang, RN  Sent: 1/7/2025   8:20 AM CST  To: Pam Orozco,     Could you maybe help me with this? I think her auth was 11/26-12/26. They just contacted us this morning.     Thank you!  ----- Message -----  From: Kwame Garcia, Patient Care Assistant  Sent: 1/7/2025   8:13 AM CST  To: Cassius Watt S Staff    Who Called: ANDRE RUSH ([facility])     Caller is requesting assistance/information from provider's office.          Preferred Method of Contact: Phone Call  Patient's Preferred Phone Number on File: [phone]   Best Call Back Number, if different:  Additional Information: The clinic called to see if there could be an extended PA on the CTA that's scheduled for tomorrow, Jan 8th. The call back number is [phone]

## 2025-01-07 NOTE — TELEPHONE ENCOUNTER
LVM for Mrs. Hernández stating precert told us it was withdrawn and that I had attempted to contact pt to let her know we did not have extension/approval with no answer and could not leave a VM. I asked Mrs. Hernández to return my call when she could.

## 2025-01-08 NOTE — TELEPHONE ENCOUNTER
This was approved late yesterday evening. Garima sutherland [facility] called Betty in [place] and [facility].

## 2025-01-10 NOTE — TELEPHONE ENCOUNTER
Attempted to contact pt to let her know mild nonobst. CAD-- see PCP for noncardiac CP and accidental finding of adrenal gland nodule, and we would like her to start lipitor 20mg daily for precautions.     No answer. No VM set up.

## 2025-01-16 RX ORDER — ATORVASTATIN CALCIUM 20 MG/1
20 TABLET, FILM COATED ORAL DAILY
Qty: 30 TABLET | Refills: 5 | Status: SHIPPED | OUTPATIENT
Start: 2025-01-16 | End: 2026-01-16

## 2025-01-30 ENCOUNTER — OFFICE VISIT (OUTPATIENT)
Dept: FAMILY MEDICINE | Facility: CLINIC | Age: 55
End: 2025-01-30
Payer: COMMERCIAL

## 2025-01-30 VITALS
OXYGEN SATURATION: 95 % | SYSTOLIC BLOOD PRESSURE: 136 MMHG | TEMPERATURE: 98 F | WEIGHT: 187.38 LBS | HEIGHT: 67 IN | RESPIRATION RATE: 18 BRPM | BODY MASS INDEX: 29.41 KG/M2 | DIASTOLIC BLOOD PRESSURE: 58 MMHG | HEART RATE: 99 BPM

## 2025-01-30 DIAGNOSIS — R94.30 ABNORMAL RESULTS OF CARDIOVASCULAR FUNCTION STUDIES: ICD-10-CM

## 2025-01-30 DIAGNOSIS — M47.812 CERVICAL SPONDYLOSIS WITHOUT MYELOPATHY: Chronic | ICD-10-CM

## 2025-01-30 DIAGNOSIS — R94.31 ABNORMAL ELECTROCARDIOGRAM (ECG) (EKG): ICD-10-CM

## 2025-01-30 DIAGNOSIS — F41.9 ANXIETY: ICD-10-CM

## 2025-01-30 DIAGNOSIS — K21.9 GASTROESOPHAGEAL REFLUX DISEASE, UNSPECIFIED WHETHER ESOPHAGITIS PRESENT: ICD-10-CM

## 2025-01-30 DIAGNOSIS — F17.200 SMOKER: ICD-10-CM

## 2025-01-30 DIAGNOSIS — R09.81 SINUS CONGESTION: ICD-10-CM

## 2025-01-30 DIAGNOSIS — I10 ESSENTIAL HYPERTENSION: ICD-10-CM

## 2025-01-30 DIAGNOSIS — E03.9 HYPOTHYROIDISM, UNSPECIFIED TYPE: ICD-10-CM

## 2025-01-30 DIAGNOSIS — Z09 FOLLOW-UP EXAM: Primary | ICD-10-CM

## 2025-01-30 RX ORDER — LOSARTAN POTASSIUM AND HYDROCHLOROTHIAZIDE 12.5; 5 MG/1; MG/1
1 TABLET ORAL DAILY
Qty: 90 TABLET | Refills: 3 | Status: SHIPPED | OUTPATIENT
Start: 2025-01-30

## 2025-01-30 RX ORDER — OMEPRAZOLE 40 MG/1
40 CAPSULE, DELAYED RELEASE ORAL EVERY MORNING
Qty: 90 CAPSULE | Refills: 3 | Status: SHIPPED | OUTPATIENT
Start: 2025-01-30 | End: 2025-01-30 | Stop reason: SDUPTHER

## 2025-01-30 RX ORDER — KETOROLAC TROMETHAMINE 30 MG/ML
30 INJECTION, SOLUTION INTRAMUSCULAR; INTRAVENOUS
Status: COMPLETED | OUTPATIENT
Start: 2025-01-30 | End: 2025-01-30

## 2025-01-30 RX ORDER — OMEPRAZOLE 40 MG/1
40 CAPSULE, DELAYED RELEASE ORAL
Qty: 180 CAPSULE | Refills: 1 | Status: SHIPPED | OUTPATIENT
Start: 2025-01-30

## 2025-01-30 RX ORDER — IBUPROFEN 800 MG/1
800 TABLET ORAL 3 TIMES DAILY
Qty: 30 TABLET | Refills: 1 | Status: SHIPPED | OUTPATIENT
Start: 2025-01-30

## 2025-01-30 RX ORDER — CETIRIZINE HYDROCHLORIDE, PSEUDOEPHEDRINE HYDROCHLORIDE 5; 120 MG/1; MG/1
1 TABLET, FILM COATED, EXTENDED RELEASE ORAL 2 TIMES DAILY
Qty: 20 TABLET | Refills: 0 | Status: SHIPPED | OUTPATIENT
Start: 2025-01-30 | End: 2025-02-09

## 2025-01-30 RX ORDER — NAPROXEN 500 MG/1
500 TABLET ORAL 2 TIMES DAILY
Qty: 30 TABLET | Refills: 0 | Status: SHIPPED | OUTPATIENT
Start: 2025-01-30

## 2025-01-30 RX ORDER — LEVOTHYROXINE SODIUM 100 UG/1
100 TABLET ORAL
Qty: 90 TABLET | Refills: 3 | Status: SHIPPED | OUTPATIENT
Start: 2025-01-30

## 2025-01-30 RX ADMIN — KETOROLAC TROMETHAMINE 30 MG: 30 INJECTION, SOLUTION INTRAMUSCULAR; INTRAVENOUS at 04:01

## 2025-01-30 NOTE — PROGRESS NOTES
Subjective:       Patient ID: Adele Solares is a 54 y.o. female.    Chief Complaint: Follow-up    The patient is a 55 yo female that presents today for follow-up. She has history of hypothyroidism, hypertension, gerd, anxiety and osteoarthritis. She is up to date on colonoscopy and mammogram.  Since we last saw her she had an abnormal stress test done that showed some chest pain.  She went to [facility] and had another procedure done and was told that it was not her heart.  She has been out of her omeprazole for the last couple of days but states that she has been taking it as directed.  She does complain of some sinus congestion and headache and also continues to have neck and shoulder pain.  She had a cardiac chest CT done that only showed minimal disease.  She is afebrile today and vital signs are stable.        Follow-up  Associated symptoms include neck pain. Pertinent negatives include no abdominal pain, arthralgias, chest pain, chills, congestion, coughing, fatigue, fever, joint swelling, myalgias, nausea, rash, sore throat, vomiting or weakness.   Back Pain  Pertinent negatives include no abdominal pain, chest pain, dysuria, fever or weakness.   Neck Pain   Pertinent negatives include no chest pain, fever, trouble swallowing or weakness.   Thyroid Problem  Patient reports no anxiety, cold intolerance, constipation, diarrhea, fatigue, heat intolerance, menstrual problem, palpitations or tremors.     Review of Systems   Constitutional:  Negative for activity change, appetite change, chills, fatigue, fever and unexpected weight change.   HENT:  Negative for nasal congestion, ear pain, hearing loss, rhinorrhea, sinus pressure/congestion, sore throat and trouble swallowing.    Eyes:  Negative for pain, discharge, redness and visual disturbance.   Respiratory:  Negative for apnea, cough, chest tightness, shortness of breath and wheezing.    Cardiovascular:  Negative for chest pain and palpitations.    Gastrointestinal:  Negative for abdominal pain, blood in stool, constipation, diarrhea, nausea, vomiting and reflux.   Endocrine: Negative for cold intolerance, heat intolerance, polydipsia and polyuria.   Genitourinary:  Negative for difficulty urinating, dysuria, hematuria and menstrual problem.   Musculoskeletal:  Positive for back pain and neck pain. Negative for arthralgias, joint swelling and myalgias.   Integumentary:  Negative for pallor, rash and wound.   Allergic/Immunologic: Negative for immunocompromised state.   Neurological:  Negative for tremors, seizures, weakness and memory loss.   Hematological:  Negative for adenopathy.   Psychiatric/Behavioral:  Negative for confusion, dysphoric mood and sleep disturbance. The patient is not nervous/anxious.          Objective:      Physical Exam  Vitals and nursing note reviewed.   Constitutional:       General: She is not in acute distress.     Appearance: Normal appearance. She is not ill-appearing.   HENT:      Head: Normocephalic and atraumatic.      Right Ear: External ear normal.      Left Ear: External ear normal.      Nose: Nose normal.      Mouth/Throat:      Pharynx: Oropharynx is clear.   Eyes:      Extraocular Movements: Extraocular movements intact.      Conjunctiva/sclera: Conjunctivae normal.      Pupils: Pupils are equal, round, and reactive to light.   Neck:      Vascular: No carotid bruit.   Cardiovascular:      Rate and Rhythm: Normal rate and regular rhythm.      Pulses: Normal pulses.      Heart sounds: Normal heart sounds. No murmur heard.  Pulmonary:      Effort: No respiratory distress.      Breath sounds: Normal breath sounds. No wheezing or rales.   Abdominal:      General: Bowel sounds are normal.      Palpations: Abdomen is soft.   Musculoskeletal:         General: Tenderness present. Normal range of motion.      Cervical back: Normal range of motion and neck supple.      Right lower leg: No edema.      Left lower leg: No edema.       Comments: Tenderness over muscles of left posterior neck       Skin:     General: Skin is warm and dry.      Capillary Refill: Capillary refill takes less than 2 seconds.      Coloration: Skin is not pale.   Neurological:      General: No focal deficit present.      Mental Status: She is alert and oriented to person, place, and time.      Cranial Nerves: No cranial nerve deficit.      Sensory: No sensory deficit.      Motor: No weakness.      Gait: Gait normal.   Psychiatric:         Mood and Affect: Mood normal.         Judgment: Judgment normal.         Assessment:       Problem List Items Addressed This Visit          Neuro    Cervical spondylosis without myelopathy (Chronic)       Psychiatric    Anxiety       ENT    Sinus congestion    Relevant Medications    cetirizine-pseudoephedrine 5-120 mg Tb12       Cardiac/Vascular    Essential hypertension    Abnormal results of cardiovascular function studies       Endocrine    Hypothyroidism       GI    Gastroesophageal reflux disease       Other    Smoker     Other Visit Diagnoses       Follow-up exam    -  Primary    Abnormal electrocardiogram (ECG) (EKG)                  Plan:       The patient presents today for follow-up  She is up to date on mammogram and colonoscopy.  Colonoscopy was done in 2022 with repeat in 5 years.  Lab work done recently has been reviewed    2.   Essential hypertension- blood pressure is 136/58   She is on losartan-hctz 50/12.5.     3.   Hypothyroidism- she is on levothyroxine 100 mcg daily.  Clinically she appears euthyroid     4.   GERD- her chest pain could be related to her GERD.  We are going to increase it to twice a day    5.   Arthralgias- neck and left shoulder. We will image neck today. Toradol and steroid injection. Mobic prn. Also robaxin prn  11/29-she is still having some issues.  X-ray with no acute process.  We will continue with NSAIDs and Robaxin.  Refer to physical therapy  5/30/23-she is still having some issues.   Plain x-ray showed some arthritic changes.  She is now having some numbness and tingling as well.  We are going to set her up for an MRI of her cervical spine and refer her to pain management.  Toradol injection today.  I have also started gabapentin.  11/30/23-she was able to get in with pain management they set her up for some injections but they had to be postponed and his she has not set this back up yet  5/30/24-she never had the injections.  Currently she is doing okay.  We are going to give her some Robaxin as needed.  1/30/25-still complains.  We are going to treat her with Toradol and steroid today.  Try Naprosyn 500 mg twice a day.  She continues to have issues I am going to refer to pain management    6.   Tobacco dependence- counseled on cessation. She is in contemplation stage. She quit before for 3 months using chantix    7. Sinus congestion-we are going to treat her with Zyrtec D    8. Noncardiac chest pain-she had a coronary CT done that looked okay.  Her chest pains not felt to be related to cardiac issues.  Could be due to GERD could be musculoskeletal.  We are going to increase her PPI to b.i.d..  Going to treat her with Naprosyn b.i.d.    Billing based on moderate level of medical decision-making      RTC 6 months

## 2025-02-05 ENCOUNTER — OFFICE VISIT (OUTPATIENT)
Dept: CARDIOLOGY | Facility: CLINIC | Age: 55
End: 2025-02-05
Payer: COMMERCIAL

## 2025-02-05 VITALS
HEIGHT: 67 IN | BODY MASS INDEX: 28.85 KG/M2 | HEART RATE: 87 BPM | WEIGHT: 183.81 LBS | DIASTOLIC BLOOD PRESSURE: 80 MMHG | OXYGEN SATURATION: 96 % | SYSTOLIC BLOOD PRESSURE: 118 MMHG

## 2025-02-05 DIAGNOSIS — Z82.49 FAMILY HISTORY OF PREMATURE CAD: ICD-10-CM

## 2025-02-05 DIAGNOSIS — I10 ESSENTIAL HYPERTENSION: Primary | ICD-10-CM

## 2025-02-05 DIAGNOSIS — R94.30 ABNORMAL RESULTS OF CARDIOVASCULAR FUNCTION STUDIES: ICD-10-CM

## 2025-02-05 DIAGNOSIS — R07.9 CHEST PAIN, UNSPECIFIED TYPE: ICD-10-CM

## 2025-02-05 PROCEDURE — 99999 PR PBB SHADOW E&M-EST. PATIENT-LVL IV: CPT | Mod: PBBFAC,,, | Performed by: NURSE PRACTITIONER

## 2025-02-05 PROCEDURE — 3008F BODY MASS INDEX DOCD: CPT | Mod: CPTII,,, | Performed by: NURSE PRACTITIONER

## 2025-02-05 PROCEDURE — 3074F SYST BP LT 130 MM HG: CPT | Mod: CPTII,,, | Performed by: NURSE PRACTITIONER

## 2025-02-05 PROCEDURE — 1160F RVW MEDS BY RX/DR IN RCRD: CPT | Mod: CPTII,,, | Performed by: NURSE PRACTITIONER

## 2025-02-05 PROCEDURE — 3079F DIAST BP 80-89 MM HG: CPT | Mod: CPTII,,, | Performed by: NURSE PRACTITIONER

## 2025-02-05 PROCEDURE — 99214 OFFICE O/P EST MOD 30 MIN: CPT | Mod: PBBFAC | Performed by: NURSE PRACTITIONER

## 2025-02-05 PROCEDURE — 99214 OFFICE O/P EST MOD 30 MIN: CPT | Mod: S$PBB,,, | Performed by: NURSE PRACTITIONER

## 2025-02-05 PROCEDURE — 1159F MED LIST DOCD IN RCRD: CPT | Mod: CPTII,,, | Performed by: NURSE PRACTITIONER

## 2025-02-06 NOTE — ASSESSMENT & PLAN NOTE
Cardiac CTa 1/8/2025  Mild non-obstructive CAD  Normal LV contractility    D/w patient NCCP. She does have GERD. She will see her PCP for further eval and treatment.

## 2025-02-06 NOTE — PROGRESS NOTES
"PCP: Yobani Lombardo DO    Referring Provider:   Chief Complaint   Patient presents with    Hypertension    Follow-up     Patient denies any cardiac complaints today.       Subjective:   Adele Solares is a 54 y.o. female with hx of HTN, GERD, anxiety, hypothyroidism, and family h/o premature CAD in 4/6 brothers. Patient presents to reevaluate symptoms and discuss the results of her cardiac CTA which demonstrated only mild non-obstructive CAD with normal LVEF. She continues to have chest discomfort and admits that she has GERD and doesn't wait 3 hours from eating to reclining. She also occasionally has some RUQ abdominal discomfort. She has not had a cholecystectomy. She will see her PCP for further eval and treatment of NCCP.     24: Continues to have weekly chest pain relieved w/rest. Also reports radiation to left arm. CCTA pending. Echo w/ EF 55%- mild- moderate MR. Continue lopressor and asa    24: present to discuss the results of her EST/echo. She continues to have issues with chest pain. Patient reports two types of chest pain to her left chest. One is a sharp pain and the other is an aching pain. This occurs approx 3-4 times a week lasting seconds; the aching pain radiates down her left arm. She can not identify a trigger or relieving factors. The pain occurs both at rest and with exertion.   EST  done 10/21/2024 did not reproduce her chest/arm pain. The EKG portion of the study was abnormal but not diagnostic.   The patient does have a significant h/o premature CAD. Her Dad is  due to kidney failure and heart disease. She has 6 brothers and 4 of the 6 have heart disease. Of the 4 brothers that have CAD/stents, three were in their 40s and one in his 50s.     10/2/2024 presents for evaluation of chest pain.  Referred by ANDRES Weldon NP in ED. "Chest pain sometimes feels like pinprick, sometimes like pressure, sometimes goes to left arm.  I have arthritis in my neck and shoulders".  Chest " pain is not exertional.  Back pain chronic, over 10 years.       Fhx:  Family History   Problem Relation Name Age of Onset    Breast cancer Mother Rachana Solares     Cancer Mother Rachana Solares     Breast cancer Sister Kenya Solares     Cancer Sister Kenya Solares     Arthritis Maternal Aunt Osiris Mcgrath      Shx:   Social History     Socioeconomic History    Marital status: Single   Tobacco Use    Smoking status: Every Day     Current packs/day: 0.50     Average packs/day: 0.5 packs/day for 30.0 years (15.0 ttl pk-yrs)     Types: Cigarettes    Smokeless tobacco: Never    Tobacco comments:     Been smoking for over 30 years   Substance and Sexual Activity    Alcohol use: Yes     Alcohol/week: 1.0 standard drink of alcohol     Types: 1 Glasses of wine per week     Comment: A glass of wine maybe twice a month    Drug use: Never    Sexual activity: Yes     Partners: Male     Birth control/protection: None     Social Drivers of Health     Financial Resource Strain: Low Risk  (10/17/2024)    Overall Financial Resource Strain (CARDIA)     Difficulty of Paying Living Expenses: Not very hard   Food Insecurity: Food Insecurity Present (10/17/2024)    Hunger Vital Sign     Worried About Running Out of Food in the Last Year: Sometimes true     Ran Out of Food in the Last Year: Sometimes true   Physical Activity: Inactive (10/17/2024)    Exercise Vital Sign     Days of Exercise per Week: 0 days     Minutes of Exercise per Session: 0 min   Stress: No Stress Concern Present (10/17/2024)    [employer] - Occupational Stress Questionnaire     Feeling of Stress : Not at all   Housing Stability: High Risk (10/17/2024)    Housing Stability Vital Sign     Unable to Pay for Housing in the Last Year: Yes       EKG   9/28/24- NSR, 68 bpm; when compared with EKG 8/15/2022 no significant change was found.  8/15/2022 RSR with HR 71 bpm; normal EKG    LEXISCAN:  3/6/18--No definite findings of significant  reversible ischemia can be detected.  Fixed basal anterior wall and basal inferior wall defect with no associated wall motion abnormalities suggestive of possible attenuation artifact appeared to be more likely than ischemia. Normal LV size and systolic function. EF 55%.     Cardiac CTa 1/8/2025  Mild non-obstructive CAD  Normal LV contractility          Lab Results   Component Value Date     11/05/2024    K 3.8 11/05/2024     (H) 11/05/2024    CO2 31 11/05/2024    BUN 18 11/05/2024    CREATININE 0.72 11/05/2024    CALCIUM 9.4 11/05/2024    ANIONGAP 7 11/05/2024    ESTGFRAFRICA 93 11/01/2021    EGFRNONAA 86 06/06/2022       Lab Results   Component Value Date    CHOL 174 11/05/2024    CHOL 175 11/30/2023    CHOL 161 09/08/2022     Lab Results   Component Value Date    HDL 40 11/05/2024    HDL 43 11/30/2023    HDL 36 (L) 09/08/2022     Lab Results   Component Value Date    LDLCALC 119 11/05/2024    LDLCALC 118 11/30/2023    LDLCALC 108 09/08/2022     Lab Results   Component Value Date    TRIG 75 11/05/2024    TRIG 72 11/30/2023    TRIG 85 09/08/2022     Lab Results   Component Value Date    CHOLHDL 4.4 11/05/2024    CHOLHDL 4.1 11/30/2023    CHOLHDL 4.5 09/08/2022       Lab Results   Component Value Date    WBC 5.59 11/05/2024    HGB 12.6 11/05/2024    HCT 40.4 11/05/2024    MCV 95.3 11/05/2024     11/05/2024           Current Outpatient Medications:     aspirin (ECOTRIN) 81 MG EC tablet, Take 1 tablet (81 mg total) by mouth once daily., Disp: 90 tablet, Rfl: 3    atorvastatin (LIPITOR) 20 MG tablet, Take 1 tablet (20 mg total) by mouth once daily., Disp: 30 tablet, Rfl: 5    busPIRone (BUSPAR) 7.5 MG tablet, Take 1 tablet (7.5 mg total) by mouth 3 (three) times daily., Disp: 90 tablet, Rfl: 5    cetirizine-pseudoephedrine 5-120 mg Tb12, Take 1 each by mouth 2 (two) times a day. for 10 days, Disp: 20 tablet, Rfl: 0    gabapentin (NEURONTIN) 100 MG capsule, Take 1 capsule (100 mg total) by mouth 3  "(three) times daily. (Patient not taking: Reported on 12/12/2024), Disp: 90 capsule, Rfl: 3    ibuprofen (ADVIL,MOTRIN) 800 MG tablet, Take 1 tablet (800 mg total) by mouth 3 (three) times daily., Disp: 30 tablet, Rfl: 1    levothyroxine (SYNTHROID) 100 MCG tablet, Take 1 tablet (100 mcg total) by mouth before breakfast., Disp: 90 tablet, Rfl: 3    losartan-hydrochlorothiazide 50-12.5 mg (HYZAAR) 50-12.5 mg per tablet, Take 1 tablet by mouth once daily., Disp: 90 tablet, Rfl: 3    naproxen (NAPROSYN) 500 MG tablet, Take 1 tablet (500 mg total) by mouth 2 (two) times daily., Disp: 30 tablet, Rfl: 0    omeprazole (PRILOSEC) 40 MG capsule, Take 1 capsule (40 mg total) by mouth 2 (two) times daily before meals., Disp: 180 capsule, Rfl: 1  Did not bring medications.     Review of Systems   Respiratory:  Negative for shortness of breath.    Cardiovascular:  Positive for chest pain. Negative for palpitations and leg swelling.   Neurological:  Negative for loss of consciousness.           Objective:   /80 (BP Location: Left arm, Patient Position: Sitting)   Pulse 87   Ht 5' 7" (1.702 m)   Wt 83.4 kg (183 lb 12.8 oz)   SpO2 96%   BMI 28.79 kg/m²     Physical Exam  Vitals reviewed.   Constitutional:       General: She is not in acute distress.     Appearance: Normal appearance.   HENT:      Head: Normocephalic and atraumatic.   Neck:      Vascular: No carotid bruit or JVD.   Cardiovascular:      Rate and Rhythm: Normal rate and regular rhythm.      Pulses: Normal pulses.           Radial pulses are 2+ on the right side and 2+ on the left side.      Heart sounds: Normal heart sounds. No murmur heard.  Pulmonary:      Effort: Pulmonary effort is normal.      Breath sounds: Normal breath sounds.   Musculoskeletal:      Right lower leg: No edema.      Left lower leg: No edema.      Comments: Chest soreness with palpation of the left chest   Skin:     General: Skin is warm and dry.   Neurological:      Mental Status: " She is alert.           Assessment:     1. Essential hypertension        2. Abnormal results of cardiovascular function studies        3. Chest pain, unspecified type        4. Family history of premature CAD              Problem List Items Addressed This Visit          Cardiac/Vascular    Abnormal results of cardiovascular function studies    Current Assessment & Plan     Cardiac CTa 2025  Mild non-obstructive CAD  Normal LV contractility    D/w patient NCCP. She does have GERD. She will see her PCP for further eval and treatment.         Chest pain    Current Assessment & Plan     Cardiac CTa 2025  Mild non-obstructive CAD  Normal LV contractility    D/w patient NCCP. She does have GERD. She will see her PCP for further eval and treatment.         Essential hypertension - Primary    Current Assessment & Plan     118/80 mmHg         Family history of premature CAD    Current Assessment & Plan     The patient does have a significant h/o premature CAD. Her Dad is  due to kidney failure and heart disease. She has 6 brothers and 4 of the 6 have heart disease. Of the 4 brothers that have CAD/stents, three were in their 40s and one in his 50s.                      RTC: 6 months

## 2025-02-07 RX ORDER — BUSPIRONE HYDROCHLORIDE 7.5 MG/1
TABLET ORAL
Qty: 90 TABLET | Refills: 5 | Status: SHIPPED | OUTPATIENT
Start: 2025-02-07

## 2025-03-05 ENCOUNTER — OFFICE VISIT (OUTPATIENT)
Dept: FAMILY MEDICINE | Facility: CLINIC | Age: 55
End: 2025-03-05
Payer: COMMERCIAL

## 2025-03-05 VITALS
OXYGEN SATURATION: 97 % | WEIGHT: 179.81 LBS | HEART RATE: 87 BPM | BODY MASS INDEX: 28.22 KG/M2 | DIASTOLIC BLOOD PRESSURE: 77 MMHG | SYSTOLIC BLOOD PRESSURE: 111 MMHG | TEMPERATURE: 98 F | RESPIRATION RATE: 18 BRPM | HEIGHT: 67 IN

## 2025-03-05 DIAGNOSIS — J06.9 ACUTE UPPER RESPIRATORY INFECTION: Primary | ICD-10-CM

## 2025-03-05 LAB
CTP QC/QA: YES
MOLECULAR STREP A: NEGATIVE
POC MOLECULAR INFLUENZA A AGN: NEGATIVE
POC MOLECULAR INFLUENZA B AGN: NEGATIVE
SARS-COV-2 RDRP RESP QL NAA+PROBE: NEGATIVE

## 2025-03-05 PROCEDURE — 87635 SARS-COV-2 COVID-19 AMP PRB: CPT | Mod: QW,GC,, | Performed by: SPECIALIST

## 2025-03-05 PROCEDURE — 3074F SYST BP LT 130 MM HG: CPT | Mod: CPTII,,, | Performed by: SPECIALIST

## 2025-03-05 PROCEDURE — 3008F BODY MASS INDEX DOCD: CPT | Mod: CPTII,,, | Performed by: SPECIALIST

## 2025-03-05 PROCEDURE — 3078F DIAST BP <80 MM HG: CPT | Mod: CPTII,,, | Performed by: SPECIALIST

## 2025-03-05 PROCEDURE — 87651 STREP A DNA AMP PROBE: CPT | Mod: QW,GC,, | Performed by: SPECIALIST

## 2025-03-05 PROCEDURE — 1159F MED LIST DOCD IN RCRD: CPT | Mod: CPTII,,, | Performed by: SPECIALIST

## 2025-03-05 PROCEDURE — 87502 INFLUENZA DNA AMP PROBE: CPT | Mod: QW,GC,, | Performed by: SPECIALIST

## 2025-03-05 PROCEDURE — 99213 OFFICE O/P EST LOW 20 MIN: CPT | Mod: GC,,, | Performed by: SPECIALIST

## 2025-03-05 NOTE — ASSESSMENT & PLAN NOTE
Two day history of cough and runny nose  All vital signs within normal limits  COVID/influenza/strep throat negative  Symptomatic treatment  We will continue to monitor  Return to clinic or ED if symptoms worsen

## 2025-03-05 NOTE — PROGRESS NOTES
Subjective:       Patient ID: Adele Solares is a 54 y.o. female.    Chief Complaint: Cough, Headache, and Nasal Congestion (Room 9 c/o cough, runny nose and headache for 2 days )    Patient is a 54-year-old female who presents to the clinic for a sick visit.  Patient complains of 2 days of cough, runny nose and headache.  Otherwise patient is able to eat, drink and is doing fine.  All vital signs within normal limits.  Patient was tested for COVID/influenza/strep all which were negative.  Patient is encouraged to follow up in clinic if symptoms worsen.  Symptomatic treatment.  Follow up with PCP at regularly scheduled interval.    Current Medications[1]    Review of patient's allergies indicates:  No Known Allergies    Past Medical History:   Diagnosis Date    GERD (gastroesophageal reflux disease)     Hypertension     Thyroid disease        Past Surgical History:   Procedure Laterality Date    HYSTERECTOMY  Aug 2012    OOPHORECTOMY      Robotic assisted vaginal hysterectomy (DVH) with a unilateral salpingo oophorectomy      TUBAL LIGATION         Family History   Problem Relation Name Age of Onset    Breast cancer Mother Rachana Solares     Cancer Mother Rachana Solares     Breast cancer Sister Kenya Solares     Cancer Sister Kenya Solares     Arthritis Maternal Aunt Osiris Mcgrath        Social History[2]    Review of Systems   Constitutional:  Negative for activity change, appetite change, chills, diaphoresis, fatigue, fever and unexpected weight change.   HENT:  Positive for nasal congestion, postnasal drip, rhinorrhea, sinus pressure/congestion and sore throat. Negative for dental problem, drooling, ear discharge, ear pain, facial swelling, hearing loss, mouth sores, nosebleeds, sneezing, tinnitus, trouble swallowing, voice change and goiter.    Eyes:  Negative for photophobia, discharge, itching and visual disturbance.   Respiratory:  Positive for cough. Negative for apnea, choking, chest  tightness, shortness of breath, wheezing and stridor.    Cardiovascular:  Negative for chest pain, palpitations, leg swelling and claudication.   Gastrointestinal:  Negative for abdominal distention, abdominal pain, anal bleeding, blood in stool, change in bowel habit, constipation, diarrhea, nausea and vomiting.   Endocrine: Negative for cold intolerance, heat intolerance, polydipsia, polyphagia and polyuria.   Genitourinary:  Negative for bladder incontinence, decreased urine volume, difficulty urinating, dysuria, enuresis, flank pain, frequency, hematuria, nocturia and urgency.   Musculoskeletal:  Negative for arthralgias, back pain, gait problem, joint swelling, leg pain, myalgias, neck pain, neck stiffness and joint deformity.   Integumentary:  Negative for pallor, rash, wound, breast mass and breast tenderness.   Allergic/Immunologic: Negative for environmental allergies, food allergies and immunocompromised state.   Neurological:  Negative for dizziness, vertigo, tremors, seizures, syncope, facial asymmetry, speech difficulty, weakness, light-headedness, numbness, headaches, memory loss and coordination difficulties.   Hematological:  Negative for adenopathy. Does not bruise/bleed easily.   Psychiatric/Behavioral:  Negative for agitation, behavioral problems, confusion, decreased concentration, dysphoric mood, hallucinations, self-injury, sleep disturbance and suicidal ideas. The patient is not nervous/anxious and is not hyperactive.    All other systems reviewed and are negative.  Breast: Negative for mass and tenderness      Current Medications:   Medication List with Changes/Refills   Current Medications    ASPIRIN (ECOTRIN) 81 MG EC TABLET    Take 1 tablet (81 mg total) by mouth once daily.       Start Date: 11/5/2024 End Date: 11/5/2025    ATORVASTATIN (LIPITOR) 20 MG TABLET    Take 1 tablet (20 mg total) by mouth once daily.       Start Date: 1/16/2025 End Date: 1/16/2026    BUSPIRONE (BUSPAR) 7.5 MG  "TABLET    TAKE 1 TABLET(7.5 MG) BY MOUTH THREE TIMES DAILY       Start Date: 2/7/2025  End Date: --    GABAPENTIN (NEURONTIN) 100 MG CAPSULE    Take 1 capsule (100 mg total) by mouth 3 (three) times daily.       Start Date: 11/30/2023End Date: 11/29/2024    IBUPROFEN (ADVIL,MOTRIN) 800 MG TABLET    Take 1 tablet (800 mg total) by mouth 3 (three) times daily.       Start Date: 1/30/2025 End Date: --    LEVOTHYROXINE (SYNTHROID) 100 MCG TABLET    Take 1 tablet (100 mcg total) by mouth before breakfast.       Start Date: 1/30/2025 End Date: --    LOSARTAN-HYDROCHLOROTHIAZIDE 50-12.5 MG (HYZAAR) 50-12.5 MG PER TABLET    Take 1 tablet by mouth once daily.       Start Date: 1/30/2025 End Date: --    NAPROXEN (NAPROSYN) 500 MG TABLET    Take 1 tablet (500 mg total) by mouth 2 (two) times daily.       Start Date: 1/30/2025 End Date: --    OMEPRAZOLE (PRILOSEC) 40 MG CAPSULE    Take 1 capsule (40 mg total) by mouth 2 (two) times daily before meals.       Start Date: 1/30/2025 End Date: --            Objective:        Vitals:    03/05/25 0922   BP: 111/77   BP Location: Left arm   Patient Position: Sitting   Pulse: 87   Resp: 18   Temp: 98.3 °F (36.8 °C)   TempSrc: Oral   SpO2: 97%   Weight: 81.6 kg (179 lb 12.8 oz)   Height: 5' 7" (1.702 m)       Physical Exam  Vitals and nursing note reviewed.   Constitutional:       General: She is awake. She is not in acute distress.     Appearance: Normal appearance. She is well-developed, well-groomed and overweight. She is not ill-appearing or toxic-appearing.   HENT:      Head: Normocephalic and atraumatic.      Nose: Congestion and rhinorrhea present.      Mouth/Throat:      Pharynx: Oropharynx is clear. Posterior oropharyngeal erythema present.   Eyes:      Conjunctiva/sclera: Conjunctivae normal.   Cardiovascular:      Rate and Rhythm: Normal rate and regular rhythm.      Pulses: Normal pulses.      Heart sounds: Normal heart sounds.   Pulmonary:      Effort: Pulmonary effort is " normal.      Breath sounds: Normal breath sounds.   Musculoskeletal:      Cervical back: Normal range of motion and neck supple.      Right lower leg: No edema.      Left lower leg: No edema.   Skin:     General: Skin is warm and dry.   Neurological:      Mental Status: She is alert and oriented to person, place, and time.   Psychiatric:         Mood and Affect: Mood normal.         Behavior: Behavior normal. Behavior is cooperative.         Thought Content: Thought content normal.         Judgment: Judgment normal.           Lab Results   Component Value Date    WBC 5.59 11/05/2024    HGB 12.6 11/05/2024    HCT 40.4 11/05/2024     11/05/2024    CHOL 174 11/05/2024    TRIG 75 11/05/2024    HDL 40 11/05/2024    ALT 11 (L) 11/05/2024    AST 8 (L) 11/05/2024     11/05/2024    K 3.8 11/05/2024     (H) 11/05/2024    CREATININE 0.72 11/05/2024    BUN 18 11/05/2024    CO2 31 11/05/2024    TSH 0.381 09/28/2024    INR 1.08 09/28/2024    HGBA1C 5.0 04/02/2024      Assessment:       1. Acute upper respiratory infection        Plan:         Problem List Items Addressed This Visit       Acute upper respiratory infection - Primary    Two day history of cough and runny nose  All vital signs within normal limits  COVID/influenza/strep throat negative  Symptomatic treatment  We will continue to monitor  Return to clinic or ED if symptoms worsen         Relevant Orders    POCT Strep A, Molecular (Completed)    POCT COVID-19 Rapid Screening (Completed)    POCT Influenza A/B Molecular (Completed)         Follow up if symptoms worsen or fail to improve.    Primitivo Palafox MD     Instructed patient that if symptoms fail to improve or worsen patient should seek immediate medical attention or report to the nearest emergency department. Patient expressed verbal agreement and understanding to this plan of care.           [1]    Current Outpatient Medications:     aspirin (ECOTRIN) 81 MG EC tablet, Take 1 tablet (81 mg  total) by mouth once daily., Disp: 90 tablet, Rfl: 3    atorvastatin (LIPITOR) 20 MG tablet, Take 1 tablet (20 mg total) by mouth once daily., Disp: 30 tablet, Rfl: 5    busPIRone (BUSPAR) 7.5 MG tablet, TAKE 1 TABLET(7.5 MG) BY MOUTH THREE TIMES DAILY, Disp: 90 tablet, Rfl: 5    ibuprofen (ADVIL,MOTRIN) 800 MG tablet, Take 1 tablet (800 mg total) by mouth 3 (three) times daily., Disp: 30 tablet, Rfl: 1    levothyroxine (SYNTHROID) 100 MCG tablet, Take 1 tablet (100 mcg total) by mouth before breakfast., Disp: 90 tablet, Rfl: 3    losartan-hydrochlorothiazide 50-12.5 mg (HYZAAR) 50-12.5 mg per tablet, Take 1 tablet by mouth once daily., Disp: 90 tablet, Rfl: 3    naproxen (NAPROSYN) 500 MG tablet, Take 1 tablet (500 mg total) by mouth 2 (two) times daily., Disp: 30 tablet, Rfl: 0    omeprazole (PRILOSEC) 40 MG capsule, Take 1 capsule (40 mg total) by mouth 2 (two) times daily before meals., Disp: 180 capsule, Rfl: 1    gabapentin (NEURONTIN) 100 MG capsule, Take 1 capsule (100 mg total) by mouth 3 (three) times daily. (Patient not taking: Reported on 12/12/2024), Disp: 90 capsule, Rfl: 3  [2]  Social History  Tobacco Use    Smoking status: Every Day     Current packs/day: 0.50     Average packs/day: 0.5 packs/day for 30.0 years (15.0 ttl pk-yrs)     Types: Cigarettes    Smokeless tobacco: Never    Tobacco comments:     Been smoking for over 30 years   Substance Use Topics    Alcohol use: Yes     Alcohol/week: 1.0 standard drink of alcohol     Types: 1 Glasses of wine per week     Comment: A glass of wine maybe twice a month    Drug use: Never

## 2025-03-05 NOTE — LETTER
March 5, 2025      [facility]  [address]  [place] [address]  Phone: [phone]  Fax: [phone]       Patient: Adele Solares   YOB: 1970  Date of Visit: 03/05/2025    To Whom It May Concern:    Twin Solares  was at [facility] on 03/05/2025. The patient may return to work/school on 3/6/2025 with no restrictions. If you have any questions or concerns, or if I can be of further assistance, please do not hesitate to contact me.    Sincerely,    Primitivo Palafox MD

## 2025-05-27 RX ORDER — LOSARTAN POTASSIUM AND HYDROCHLOROTHIAZIDE 12.5; 5 MG/1; MG/1
1 TABLET ORAL DAILY
Qty: 90 TABLET | Refills: 3 | Status: SHIPPED | OUTPATIENT
Start: 2025-05-27

## 2025-06-25 ENCOUNTER — HOSPITAL ENCOUNTER (OUTPATIENT)
Dept: RADIOLOGY | Facility: HOSPITAL | Age: 55
Discharge: HOME | End: 2025-06-25
Attending: INTERNAL MEDICINE
Payer: COMMERCIAL

## 2025-06-25 VITALS — WEIGHT: 185 LBS | BODY MASS INDEX: 29.03 KG/M2 | HEIGHT: 67 IN

## 2025-06-25 DIAGNOSIS — Z12.31 VISIT FOR SCREENING MAMMOGRAM: ICD-10-CM

## 2025-06-25 PROCEDURE — 77063 BREAST TOMOSYNTHESIS BI: CPT | Mod: TC

## 2025-08-07 ENCOUNTER — OFFICE VISIT (OUTPATIENT)
Dept: FAMILY MEDICINE | Facility: CLINIC | Age: 55
End: 2025-08-07
Payer: COMMERCIAL

## 2025-08-07 VITALS
TEMPERATURE: 98 F | SYSTOLIC BLOOD PRESSURE: 128 MMHG | HEART RATE: 71 BPM | BODY MASS INDEX: 28.72 KG/M2 | WEIGHT: 183 LBS | DIASTOLIC BLOOD PRESSURE: 74 MMHG | HEIGHT: 67 IN | OXYGEN SATURATION: 98 %

## 2025-08-07 DIAGNOSIS — E03.9 HYPOTHYROIDISM, UNSPECIFIED TYPE: ICD-10-CM

## 2025-08-07 DIAGNOSIS — K21.9 GASTROESOPHAGEAL REFLUX DISEASE, UNSPECIFIED WHETHER ESOPHAGITIS PRESENT: ICD-10-CM

## 2025-08-07 DIAGNOSIS — M47.817 LUMBOSACRAL SPONDYLOSIS WITHOUT MYELOPATHY: Chronic | ICD-10-CM

## 2025-08-07 DIAGNOSIS — F17.200 SMOKER: ICD-10-CM

## 2025-08-07 DIAGNOSIS — I10 ESSENTIAL HYPERTENSION: ICD-10-CM

## 2025-08-07 DIAGNOSIS — Z09 FOLLOW-UP EXAM: Primary | ICD-10-CM

## 2025-08-07 DIAGNOSIS — F41.9 ANXIETY: ICD-10-CM

## 2025-08-07 RX ORDER — KETOROLAC TROMETHAMINE 30 MG/ML
30 INJECTION, SOLUTION INTRAMUSCULAR; INTRAVENOUS
Status: COMPLETED | OUTPATIENT
Start: 2025-08-07 | End: 2025-08-07

## 2025-08-07 RX ORDER — MELOXICAM 15 MG/1
15 TABLET ORAL DAILY
Qty: 30 TABLET | Refills: 3 | Status: SHIPPED | OUTPATIENT
Start: 2025-08-07

## 2025-08-07 RX ORDER — METHOCARBAMOL 750 MG/1
750 TABLET, FILM COATED ORAL 4 TIMES DAILY
Qty: 40 TABLET | Refills: 1 | Status: SHIPPED | OUTPATIENT
Start: 2025-08-07 | End: 2025-08-17

## 2025-08-07 RX ADMIN — KETOROLAC TROMETHAMINE 30 MG: 30 INJECTION, SOLUTION INTRAMUSCULAR; INTRAVENOUS at 04:08

## 2025-08-07 NOTE — PROGRESS NOTES
Subjective:       Patient ID: Adele Solares is a 54 y.o. female.    Chief Complaint: Follow-up (6m) and Health Maintenance (Hepatitis C Screening Never done/Pneumococcal Vaccines (Age 50+)(1 of 2 - PCV) Never done/TETANUS VACCINE due on 02/08/2005/Shingles Vaccine(1 of 2) Never done/COVID-19 Vaccine(5 - 2024-25 season) due on 09/01/2024/)    The patient is a 53 yo female that presents today for follow-up. She has history of hypothyroidism, hypertension, gerd, anxiety and osteoarthritis. She is up to date on colonoscopy and mammogram.  Since we last saw her she had an abnormal stress test done that showed some chest pain.  She went to [facility] and had another procedure done and was told that it was not her heart.  She has been out of her omeprazole for the last couple of days but states that she has been taking it as directed.  She does complain of some sinus congestion and headache and also continues to have neck and shoulder pain.  She had a cardiac chest CT done that only showed minimal disease.  She is afebrile today and vital signs are stable.    08/07/25-The patient is a 54-years-old female with a past medical history of HTN, GERD, anxiety, hypothyroidism, and dyslipidemia presents today for a follow-up visit. She complains of a persistent back pain that has been there for years. Its aggravated by walking and not relieved by anything.  She has seen pain management in the past and they recommended some injections but for different reasons this never took place.  She is okay with being referred back to them.  Patient also complains of occasional chest pain that can happen at rest or while exertion. However, an extensive cardiac work-up in the past including a stress test, EKG, and Echo have all been unremarkable. She denies any recent fever, chills, weight loss, palpitations, shortness of breath, dysuria, or dyschezia. Patient is sitting comfortably today and is in no apparent distress. Her vital  signs are normal and she is afebrile.  Blood pressure today 128/74.  She is up-to-date on mammogram and colonoscopy.        Follow-up  Associated symptoms include neck pain. Pertinent negatives include no abdominal pain, arthralgias, chest pain, chills, congestion, coughing, fatigue, fever, joint swelling, myalgias, nausea, rash, sore throat, vomiting or weakness.   Back Pain  Pertinent negatives include no abdominal pain, chest pain, dysuria, fever or weakness.   Neck Pain   Pertinent negatives include no chest pain, fever, trouble swallowing or weakness.   Thyroid Problem  Patient reports no anxiety, cold intolerance, constipation, diarrhea, fatigue, heat intolerance, menstrual problem, palpitations or tremors.     Review of Systems   Constitutional:  Negative for activity change, appetite change, chills, fatigue, fever and unexpected weight change.   HENT:  Negative for nasal congestion, ear pain, hearing loss, rhinorrhea, sinus pressure/congestion, sore throat and trouble swallowing.         Positive for headache due to back pain   Eyes:  Negative for pain, discharge, redness and visual disturbance.   Respiratory:  Negative for apnea, cough, chest tightness, shortness of breath and wheezing.    Cardiovascular:  Negative for chest pain and palpitations.   Gastrointestinal:  Negative for abdominal pain, blood in stool, constipation, diarrhea, nausea, vomiting and reflux.   Endocrine: Negative for cold intolerance, heat intolerance, polydipsia and polyuria.   Genitourinary:  Negative for difficulty urinating, dysuria, hematuria and menstrual problem.   Musculoskeletal:  Positive for back pain and neck pain. Negative for arthralgias, joint swelling and myalgias.   Integumentary:  Negative for pallor, rash and wound.   Allergic/Immunologic: Negative for immunocompromised state.   Neurological:  Negative for tremors, seizures, weakness and memory loss.   Hematological:  Negative for adenopathy.    Psychiatric/Behavioral:  Negative for confusion, dysphoric mood and sleep disturbance. The patient is not nervous/anxious.          Objective:      Physical Exam  Vitals and nursing note reviewed.   Constitutional:       General: She is not in acute distress.     Appearance: Normal appearance. She is not ill-appearing.   HENT:      Head: Normocephalic and atraumatic.      Right Ear: External ear normal.      Left Ear: External ear normal.      Nose: Nose normal.      Mouth/Throat:      Pharynx: Oropharynx is clear.   Eyes:      Extraocular Movements: Extraocular movements intact.      Conjunctiva/sclera: Conjunctivae normal.      Pupils: Pupils are equal, round, and reactive to light.   Neck:      Vascular: No carotid bruit.   Cardiovascular:      Rate and Rhythm: Normal rate and regular rhythm.      Pulses: Normal pulses.      Heart sounds: Normal heart sounds. No murmur heard.  Pulmonary:      Effort: No respiratory distress.      Breath sounds: Normal breath sounds. No wheezing or rales.   Abdominal:      General: Bowel sounds are normal.      Palpations: Abdomen is soft.   Musculoskeletal:         General: Tenderness present. Normal range of motion.      Cervical back: Normal range of motion and neck supple.      Right lower leg: No edema.      Left lower leg: No edema.      Comments:        Skin:     General: Skin is warm and dry.      Capillary Refill: Capillary refill takes less than 2 seconds.      Coloration: Skin is not pale.   Neurological:      General: No focal deficit present.      Mental Status: She is alert and oriented to person, place, and time.      Cranial Nerves: No cranial nerve deficit.      Sensory: No sensory deficit.      Motor: No weakness.      Gait: Gait normal.   Psychiatric:         Mood and Affect: Mood normal.         Judgment: Judgment normal.         Assessment:       Problem List Items Addressed This Visit    None  Visit Diagnoses         Follow-up exam    -  Primary               Plan:       The patient presents today for follow-up  She is up to date on mammogram and colonoscopy.  Colonoscopy was done in 2022 with repeat in 5 years.  We have discussed Prevnar 20.  She is in contemplation stage.    2.   Essential hypertension- blood pressure is 128/74  She is on losartan-hctz 50/12.5.     3.   Hypothyroidism- she is on levothyroxine 100 mcg daily.  Clinically she appears euthyroid .  Last TSH and free T4 both within normal limits    4.   GERD- her chest pain could be related to her GERD.  We increase PPI to twice a day.  Seems to be doing a little better    5.   Arthralgias- this has a chronic issue that she has been dealing with as long as we have been seeing her.  She has been seen by pain management in the past and they recommended some injections but she never had those done.  We are going to refer back to pain management.  Plan for NSAIDs and Robaxin.  I have recommended physical therapy and she states that it usually makes the pain worse and she is declined.    6.   Tobacco dependence- counseled on cessation. She is in contemplation stage. She quit before for 3 months using chantix    7. Noncardiac chest pain-she had a coronary CT done that looked okay.  Her chest pains not felt to be related to cardiac issues.  Could be due to GERD could be musculoskeletal.  We increased PPI to b.i.d..  We will continue with NSAIDs    Billing based on 40 minutes of time spent on this encounter      RTC 6 months

## 2025-08-11 ENCOUNTER — OFFICE VISIT (OUTPATIENT)
Dept: CARDIOLOGY | Facility: CLINIC | Age: 55
End: 2025-08-11
Payer: COMMERCIAL

## 2025-08-11 VITALS
OXYGEN SATURATION: 96 % | WEIGHT: 182 LBS | HEIGHT: 67 IN | DIASTOLIC BLOOD PRESSURE: 70 MMHG | BODY MASS INDEX: 28.56 KG/M2 | HEART RATE: 92 BPM | SYSTOLIC BLOOD PRESSURE: 108 MMHG

## 2025-08-11 DIAGNOSIS — M47.812 CERVICAL SPONDYLOSIS WITHOUT MYELOPATHY: Chronic | ICD-10-CM

## 2025-08-11 DIAGNOSIS — R07.89 NON-CARDIAC CHEST PAIN: ICD-10-CM

## 2025-08-11 DIAGNOSIS — Z82.49 FAMILY HISTORY OF PREMATURE CAD: ICD-10-CM

## 2025-08-11 DIAGNOSIS — R94.30 ABNORMAL RESULTS OF CARDIOVASCULAR FUNCTION STUDIES: ICD-10-CM

## 2025-08-11 DIAGNOSIS — I10 ESSENTIAL HYPERTENSION: Primary | ICD-10-CM

## 2025-08-11 PROCEDURE — 3074F SYST BP LT 130 MM HG: CPT | Mod: CPTII,,, | Performed by: NURSE PRACTITIONER

## 2025-08-11 PROCEDURE — 93005 ELECTROCARDIOGRAM TRACING: CPT | Mod: PBBFAC | Performed by: INTERNAL MEDICINE

## 2025-08-11 PROCEDURE — 1160F RVW MEDS BY RX/DR IN RCRD: CPT | Mod: CPTII,,, | Performed by: NURSE PRACTITIONER

## 2025-08-11 PROCEDURE — 99999 PR PBB SHADOW E&M-EST. PATIENT-LVL V: CPT | Mod: PBBFAC,,, | Performed by: NURSE PRACTITIONER

## 2025-08-11 PROCEDURE — 99215 OFFICE O/P EST HI 40 MIN: CPT | Mod: PBBFAC | Performed by: NURSE PRACTITIONER

## 2025-08-11 PROCEDURE — 99214 OFFICE O/P EST MOD 30 MIN: CPT | Mod: S$PBB,,, | Performed by: NURSE PRACTITIONER

## 2025-08-11 PROCEDURE — 1159F MED LIST DOCD IN RCRD: CPT | Mod: CPTII,,, | Performed by: NURSE PRACTITIONER

## 2025-08-11 PROCEDURE — 3078F DIAST BP <80 MM HG: CPT | Mod: CPTII,,, | Performed by: NURSE PRACTITIONER

## 2025-08-11 PROCEDURE — 93010 ELECTROCARDIOGRAM REPORT: CPT | Mod: S$PBB,,, | Performed by: INTERNAL MEDICINE

## 2025-08-11 PROCEDURE — 3008F BODY MASS INDEX DOCD: CPT | Mod: CPTII,,, | Performed by: NURSE PRACTITIONER

## 2025-08-12 LAB
OHS QRS DURATION: 86 MS
OHS QTC CALCULATION: 423 MS